# Patient Record
Sex: FEMALE | Race: WHITE | NOT HISPANIC OR LATINO | Employment: OTHER | URBAN - NONMETROPOLITAN AREA
[De-identification: names, ages, dates, MRNs, and addresses within clinical notes are randomized per-mention and may not be internally consistent; named-entity substitution may affect disease eponyms.]

---

## 2017-02-02 DIAGNOSIS — IMO0002 UNCONTROLLED TYPE 2 DIABETES MELLITUS: ICD-10-CM

## 2017-02-03 RX ORDER — INSULIN GLARGINE 100 [IU]/ML
INJECTION, SOLUTION SUBCUTANEOUS
Qty: 6 PEN | Refills: 3 | Status: SHIPPED | OUTPATIENT
Start: 2017-02-03 | End: 2017-07-20 | Stop reason: SDUPTHER

## 2017-02-28 RX ORDER — METOPROLOL SUCCINATE 50 MG/1
TABLET, EXTENDED RELEASE ORAL
Qty: 30 TABLET | Refills: 5 | Status: SHIPPED | OUTPATIENT
Start: 2017-02-28 | End: 2017-10-20 | Stop reason: SDUPTHER

## 2017-03-30 ENCOUNTER — PATIENT MESSAGE (OUTPATIENT)
Dept: INTERNAL MEDICINE | Facility: CLINIC | Age: 62
End: 2017-03-30

## 2017-03-30 NOTE — TELEPHONE ENCOUNTER
From: Mercy Clemens  To: Brooklyn Reveles MD  Sent: 3/30/2017 7:19 AM EDT  Subject: Prescription Question    prescription for bydureon 2 mg needs called in to [facility] as no refills is showing. thanks

## 2017-04-25 ENCOUNTER — PATIENT MESSAGE (OUTPATIENT)
Dept: INTERNAL MEDICINE | Facility: CLINIC | Age: 62
End: 2017-04-25

## 2017-04-26 RX ORDER — LOSARTAN POTASSIUM AND HYDROCHLOROTHIAZIDE 25; 100 MG/1; MG/1
1 TABLET ORAL DAILY
Qty: 30 TABLET | Refills: 5 | Status: SHIPPED | OUTPATIENT
Start: 2017-04-26 | End: 2017-10-20 | Stop reason: SDUPTHER

## 2017-04-26 NOTE — TELEPHONE ENCOUNTER
From: Mercy Clemens  To: Brooklyn Reveles MD  Sent: 4/25/2017 10:29 AM EDT  Subject: Prescription Question    need to refill prescription losartan hc t2 100-25 mg tab # [id number] with [facility] in [place], [place] thanks

## 2017-07-08 DIAGNOSIS — IMO0002 UNCONTROLLED TYPE 2 DIABETES MELLITUS: ICD-10-CM

## 2017-07-10 RX ORDER — FLURBIPROFEN SODIUM 0.3 MG/ML
SOLUTION/ DROPS OPHTHALMIC
Qty: 30 EACH | Refills: 5 | Status: SHIPPED | OUTPATIENT
Start: 2017-07-10 | End: 2018-03-23 | Stop reason: SDUPTHER

## 2017-07-20 DIAGNOSIS — IMO0002 UNCONTROLLED TYPE 2 DIABETES MELLITUS: ICD-10-CM

## 2017-07-20 DIAGNOSIS — M79.7 FIBROMYALGIA: ICD-10-CM

## 2017-07-20 RX ORDER — PREGABALIN 75 MG/1
75 CAPSULE ORAL 2 TIMES DAILY
Qty: 60 CAPSULE | Refills: 2 | OUTPATIENT
Start: 2017-07-20 | End: 2017-09-15 | Stop reason: SDUPTHER

## 2017-07-20 RX ORDER — INSULIN GLARGINE 100 [IU]/ML
INJECTION, SOLUTION SUBCUTANEOUS
Qty: 6 PEN | Refills: 5 | Status: SHIPPED | OUTPATIENT
Start: 2017-07-20 | End: 2017-09-15 | Stop reason: SDUPTHER

## 2017-07-21 ENCOUNTER — TELEPHONE (OUTPATIENT)
Dept: INTERNAL MEDICINE | Facility: CLINIC | Age: 62
End: 2017-07-21

## 2017-07-24 NOTE — TELEPHONE ENCOUNTER
Patient called to see if there was an update on PA for Lyrica. Would like a call back, when possible.

## 2017-07-25 ENCOUNTER — TELEPHONE (OUTPATIENT)
Dept: INTERNAL MEDICINE | Facility: CLINIC | Age: 62
End: 2017-07-25

## 2017-07-25 NOTE — TELEPHONE ENCOUNTER
PA approved for Lyrica. FRANK on [employer] vmx notifying them.   Called pt to let her know, FRANK on her vmx.

## 2017-07-25 NOTE — TELEPHONE ENCOUNTER
Called pt to let her know I had not received anything from [facility] about a PA for Lyrica. She said she had spoken with her insurance and they just needed the same PA form I had done last year. Advised her I would start on this. Faxed PA request to [facility].

## 2017-09-14 ENCOUNTER — TELEPHONE (OUTPATIENT)
Dept: INTERNAL MEDICINE | Facility: CLINIC | Age: 62
End: 2017-09-14

## 2017-09-14 DIAGNOSIS — M79.7 FIBROMYALGIA: ICD-10-CM

## 2017-09-14 RX ORDER — PREGABALIN 75 MG/1
75 CAPSULE ORAL 2 TIMES DAILY
Qty: 60 CAPSULE | Refills: 2 | Status: CANCELLED | OUTPATIENT
Start: 2017-09-14

## 2017-09-14 NOTE — TELEPHONE ENCOUNTER
PATIENT CALLED AND STATED THAT SHE SPOKE WITH THE INSURANCE AND SHE SHOULD BE APPROVED WITH THE PA FOR 6 MONTHS. PLEASE CALL AND TALK WITH THE PT ABOUT THIS.

## 2017-09-15 ENCOUNTER — OFFICE VISIT (OUTPATIENT)
Dept: INTERNAL MEDICINE | Facility: CLINIC | Age: 62
End: 2017-09-15

## 2017-09-15 VITALS
DIASTOLIC BLOOD PRESSURE: 80 MMHG | WEIGHT: 204 LBS | BODY MASS INDEX: 37.54 KG/M2 | RESPIRATION RATE: 12 BRPM | SYSTOLIC BLOOD PRESSURE: 124 MMHG | OXYGEN SATURATION: 98 % | HEART RATE: 82 BPM | HEIGHT: 62 IN

## 2017-09-15 DIAGNOSIS — E55.9 VITAMIN D DEFICIENCY: ICD-10-CM

## 2017-09-15 DIAGNOSIS — E78.49 OTHER HYPERLIPIDEMIA: ICD-10-CM

## 2017-09-15 DIAGNOSIS — I10 BENIGN ESSENTIAL HYPERTENSION: ICD-10-CM

## 2017-09-15 DIAGNOSIS — M79.7 FIBROMYALGIA: ICD-10-CM

## 2017-09-15 DIAGNOSIS — IMO0001 UNCONTROLLED TYPE 2 DIABETES MELLITUS WITHOUT COMPLICATION, WITH LONG-TERM CURRENT USE OF INSULIN: Primary | ICD-10-CM

## 2017-09-15 PROCEDURE — 83036 HEMOGLOBIN GLYCOSYLATED A1C: CPT | Performed by: FAMILY MEDICINE

## 2017-09-15 PROCEDURE — 99214 OFFICE O/P EST MOD 30 MIN: CPT | Performed by: FAMILY MEDICINE

## 2017-09-15 RX ORDER — PREGABALIN 75 MG/1
75 CAPSULE ORAL 2 TIMES DAILY
Qty: 60 CAPSULE | Refills: 2 | Status: SHIPPED | OUTPATIENT
Start: 2017-09-15 | End: 2017-10-20 | Stop reason: SDUPTHER

## 2017-09-15 RX ORDER — GLIPIZIDE 10 MG/1
10 TABLET ORAL
Qty: 60 TABLET | Refills: 3 | Status: SHIPPED | OUTPATIENT
Start: 2017-09-15 | End: 2017-12-04 | Stop reason: SDUPTHER

## 2017-09-15 RX ORDER — ERYTHROMYCIN 5 MG/G
OINTMENT OPHTHALMIC
COMMUNITY
Start: 2017-09-06 | End: 2017-10-20

## 2017-10-07 LAB
25(OH)D3+25(OH)D2 SERPL-MCNC: 29.8 NG/ML
ALBUMIN SERPL-MCNC: 4.2 G/DL (ref 3.5–5)
ALBUMIN/GLOB SERPL: 1.8 G/DL (ref 1–2)
ALP SERPL-CCNC: 57 U/L (ref 38–126)
ALT SERPL-CCNC: 60 U/L (ref 13–69)
AST SERPL-CCNC: 39 U/L (ref 15–46)
BILIRUB SERPL-MCNC: 0.8 MG/DL (ref 0.2–1.3)
BUN SERPL-MCNC: 15 MG/DL (ref 7–20)
BUN/CREAT SERPL: 21.4 (ref 7.1–23.5)
CALCIUM SERPL-MCNC: 9.6 MG/DL (ref 8.4–10.2)
CHLORIDE SERPL-SCNC: 106 MMOL/L (ref 98–107)
CHOLEST SERPL-MCNC: 174 MG/DL (ref 0–199)
CO2 SERPL-SCNC: 26 MMOL/L (ref 26–30)
CREAT SERPL-MCNC: 0.7 MG/DL (ref 0.6–1.3)
GLOBULIN SER CALC-MCNC: 2.3 GM/DL
GLUCOSE SERPL-MCNC: 195 MG/DL (ref 74–98)
HDLC SERPL-MCNC: 40 MG/DL (ref 40–60)
LDLC SERPL CALC-MCNC: 102 MG/DL (ref 0–99)
MICROALBUMIN UR-MCNC: 19.5 UG/ML
POTASSIUM SERPL-SCNC: 4.1 MMOL/L (ref 3.5–5.1)
PROT SERPL-MCNC: 6.5 G/DL (ref 6.3–8.2)
SODIUM SERPL-SCNC: 144 MMOL/L (ref 137–145)
TRIGL SERPL-MCNC: 161 MG/DL
VLDLC SERPL CALC-MCNC: 32.2 MG/DL

## 2017-10-13 ENCOUNTER — TRANSCRIBE ORDERS (OUTPATIENT)
Dept: MAMMOGRAPHY | Facility: HOSPITAL | Age: 62
End: 2017-10-13

## 2017-10-13 DIAGNOSIS — Z12.31 ENCOUNTER FOR SCREENING MAMMOGRAM FOR MALIGNANT NEOPLASM OF BREAST: Primary | ICD-10-CM

## 2017-10-13 LAB — HBA1C MFR BLD: 10.3 %

## 2017-10-20 ENCOUNTER — OFFICE VISIT (OUTPATIENT)
Dept: INTERNAL MEDICINE | Facility: CLINIC | Age: 62
End: 2017-10-20

## 2017-10-20 VITALS
HEIGHT: 62 IN | HEART RATE: 81 BPM | RESPIRATION RATE: 12 BRPM | OXYGEN SATURATION: 97 % | WEIGHT: 198.4 LBS | SYSTOLIC BLOOD PRESSURE: 124 MMHG | BODY MASS INDEX: 36.51 KG/M2 | DIASTOLIC BLOOD PRESSURE: 80 MMHG

## 2017-10-20 DIAGNOSIS — Z23 NEED FOR PNEUMOCOCCAL VACCINATION: ICD-10-CM

## 2017-10-20 DIAGNOSIS — F51.01 PRIMARY INSOMNIA: ICD-10-CM

## 2017-10-20 DIAGNOSIS — E78.2 MIXED HYPERLIPIDEMIA: ICD-10-CM

## 2017-10-20 DIAGNOSIS — E11.40 DIABETIC NEUROPATHY, PAINFUL (HCC): ICD-10-CM

## 2017-10-20 DIAGNOSIS — M79.7 FIBROMYALGIA: ICD-10-CM

## 2017-10-20 DIAGNOSIS — E55.9 VITAMIN D DEFICIENCY: ICD-10-CM

## 2017-10-20 DIAGNOSIS — IMO0001 UNCONTROLLED TYPE 2 DIABETES MELLITUS WITHOUT COMPLICATION, WITH LONG-TERM CURRENT USE OF INSULIN: Primary | ICD-10-CM

## 2017-10-20 DIAGNOSIS — I10 BENIGN ESSENTIAL HYPERTENSION: ICD-10-CM

## 2017-10-20 DIAGNOSIS — E89.40 PREMATURE SURGICAL MENOPAUSE: ICD-10-CM

## 2017-10-20 DIAGNOSIS — Z00.00 HEALTHCARE MAINTENANCE: ICD-10-CM

## 2017-10-20 DIAGNOSIS — Z91.89 AT RISK FOR BONE DENSITY LOSS: ICD-10-CM

## 2017-10-20 PROCEDURE — 90732 PPSV23 VACC 2 YRS+ SUBQ/IM: CPT | Performed by: FAMILY MEDICINE

## 2017-10-20 PROCEDURE — G0009 ADMIN PNEUMOCOCCAL VACCINE: HCPCS | Performed by: FAMILY MEDICINE

## 2017-10-20 PROCEDURE — 99396 PREV VISIT EST AGE 40-64: CPT | Performed by: FAMILY MEDICINE

## 2017-10-20 PROCEDURE — 99213 OFFICE O/P EST LOW 20 MIN: CPT | Performed by: FAMILY MEDICINE

## 2017-10-20 RX ORDER — GABAPENTIN 100 MG/1
100 CAPSULE ORAL 2 TIMES DAILY
Qty: 60 CAPSULE | Refills: 2 | Status: CANCELLED | OUTPATIENT
Start: 2017-10-20

## 2017-10-20 RX ORDER — LOSARTAN POTASSIUM AND HYDROCHLOROTHIAZIDE 25; 100 MG/1; MG/1
1 TABLET ORAL DAILY
Qty: 90 TABLET | Refills: 1 | Status: SHIPPED | OUTPATIENT
Start: 2017-10-20 | End: 2017-12-04 | Stop reason: SDUPTHER

## 2017-10-20 RX ORDER — METOPROLOL SUCCINATE 25 MG/1
25 TABLET, EXTENDED RELEASE ORAL DAILY
Qty: 30 TABLET | Refills: 1 | Status: SHIPPED | OUTPATIENT
Start: 2017-10-20 | End: 2017-12-04 | Stop reason: SDUPTHER

## 2017-10-20 RX ORDER — PREGABALIN 150 MG/1
150 CAPSULE ORAL 2 TIMES DAILY
Qty: 60 CAPSULE | Refills: 2 | Status: SHIPPED | OUTPATIENT
Start: 2017-10-20 | End: 2017-12-05 | Stop reason: SDUPTHER

## 2017-10-20 RX ORDER — TRAZODONE HYDROCHLORIDE 50 MG/1
TABLET ORAL
Qty: 60 TABLET | Refills: 1 | Status: SHIPPED | OUTPATIENT
Start: 2017-10-20 | End: 2017-12-04 | Stop reason: SDUPTHER

## 2017-10-20 RX ORDER — ATORVASTATIN CALCIUM 10 MG/1
10 TABLET, FILM COATED ORAL NIGHTLY
Qty: 90 TABLET | Refills: 1 | Status: SHIPPED | OUTPATIENT
Start: 2017-10-20 | End: 2017-12-04 | Stop reason: SDUPTHER

## 2017-10-20 NOTE — PROGRESS NOTES
Physical    Subjective   Mercy Clemens is a 62 y.o. female and is here for a comprehensive physical exam. The patient reports problems - diabetic neuropathy.    Diabetes Mellitus  Patient presents for follow up of diabetes. Current symptoms include: paresthesia of the feet. Symptoms have gradually improved. Patient denies hyperglycemia and hypoglycemia . Evaluation to date has included: hemoglobin A1C.  Home sugars: BGs range between 160-220 and 260-280 1 hour after meals. Current treatment: restarted glipizide.  She is following a diabetic diet for the most part.    Neuropathy: she is on 75 mg bid of lyrica for fibromyalgia.  She has been having more burning pain on bottom of feet and tried a 100 mg gabapentin that she had from previous prescriptions.  She thought it helped.  She hasn't been on higher lyrica doses in the past.       Do you take any herbs or supplements that were not prescribed by a doctor? no  Are you taking calcium supplements? N/A  Are you taking aspirin daily? No     History:  LMP: No LMP recorded.  Menopause: Yes  Last pap date: N/A  Abnormal pap? no    Period Duration (Days): Surgical menopause    OB History    Para Term  AB Living   2 2 2   2   SAB TAB Ectopic Multiple Live Births       2      # Outcome Date GA Lbr Nilo/2nd Weight Sex Delivery Anes PTL Lv   2 Term     M Vag-Spont   ILYA   1 Term     M Vag-Spont   ILYA         reports that she currently engages in sexual activity and has had male partners.      The following portions of the patient's history were reviewed and updated as appropriate:   She  has a past medical history of Abdominal bloating; Abdominal pain; BMI 34.0-34.9,adult; Diabetes mellitus; Diarrhea; Encounter for long-term (current) use of medications; Fatigue; Hyperlipidemia; Hypertension; Marked eosinophilia; Myalgia and myositis; Nausea; Reaction to chronic stress; RUQ abdominal pain; and Viral gastroenteritis.  She has Hyperlipidemia; Benign essential  hypertension; Gastroesophageal reflux disease; Uncontrolled type 2 diabetes mellitus without complication, with long-term current use of insulin; Vitamin D deficiency; and Fibromyalgia on her pertinent problem list.  She  has a past surgical history that includes Bladder surgery; Colonoscopy; Hysterectomy; and Bilateral oophorectomy.  Her family history includes Arthritis in her mother; Cancer in her father, maternal uncle, and paternal uncle; Hyperlipidemia in her mother; Hypertension in her mother; Lung cancer in her father, maternal uncle, and paternal uncle; Lymphoma in her maternal uncle; No Known Problems in her sister and sister; Stroke in her mother.  She  reports that she has never smoked. She has never used smokeless tobacco. She reports that she does not drink alcohol or use illicit drugs.  Current Outpatient Prescriptions   Medication Sig Dispense Refill   • atorvastatin (LIPITOR) 10 MG tablet Take 1 tablet by mouth Every Night. 90 tablet 1   • B-D UF III MINI PEN NEEDLES 31G X 5 MM misc USE ONCE DAILY AS DIRECTED 30 each 5   • Cholecalciferol (VITAMIN D3) 5000 UNITS tablet Take  by mouth. One po qd     • glipiZIDE (GLUCOTROL) 10 MG tablet Take 1 tablet by mouth 2 (Two) Times a Day Before Meals. 60 tablet 3   • glucose blood test strip Test once daily 100 each 3   • Insulin Glargine (LANTUS SOLOSTAR) 100 UNIT/ML injection pen Inject 45 Units under the skin Every Night. 5 pen 2   • LACTOBACILLUS EXTRA STRENGTH capsule Take  by mouth. Take as directed     • losartan-hydrochlorothiazide (HYZAAR) 100-25 MG per tablet Take 1 tablet by mouth Daily. 90 tablet 1   • metoprolol succinate XL (TOPROL-XL) 25 MG 24 hr tablet Take 1 tablet by mouth Daily. 30 tablet 1   • pregabalin (LYRICA) 150 MG capsule Take 1 capsule by mouth 2 (Two) Times a Day. 60 capsule 2   • Dulaglutide 0.75 MG/0.5ML solution pen-injector Inject 0.75 mg under the skin 1 (One) Time Per Week. 4 pen 2   • traZODone (DESYREL) 50 MG tablet Take 1  "tab nightly before bed, increase to 2 after 1 week if not effective 60 tablet 1     No current facility-administered medications for this visit.    .    Review of Systems  Do you have pain that bothers you in your daily life? yes    Review of Systems   Constitutional: Negative.    HENT: Negative.    Eyes: Negative.    Respiratory: Negative.    Cardiovascular: Negative.    Gastrointestinal: Negative.    Endocrine: Negative.    Genitourinary: Negative.    Musculoskeletal: Positive for gait problem and myalgias.   Skin: Negative.    Allergic/Immunologic: Negative.    Neurological: Positive for numbness.   Hematological: Negative.    Psychiatric/Behavioral: Positive for sleep disturbance.         Objective   /80  Pulse 81  Resp 12  Ht 62\" (157.5 cm)  Wt 198 lb 6.4 oz (90 kg)  SpO2 97%  BMI 36.29 kg/m2    Physical Exam   Constitutional: She is oriented to person, place, and time. She appears well-developed and well-nourished. No distress.   HENT:   Head: Normocephalic and atraumatic.   Right Ear: Tympanic membrane, external ear and ear canal normal.   Left Ear: Tympanic membrane, external ear and ear canal normal.   Nose: No rhinorrhea.   Mouth/Throat: Uvula is midline, oropharynx is clear and moist and mucous membranes are normal. No posterior oropharyngeal erythema. No tonsillar exudate.   Eyes: Conjunctivae and lids are normal. Pupils are equal, round, and reactive to light. Right eye exhibits no discharge. Left eye exhibits no discharge. No scleral icterus.   Neck: Trachea normal, normal range of motion and phonation normal. Neck supple. No thyroid mass and no thyromegaly present.   Cardiovascular: Normal rate, regular rhythm and normal heart sounds.    No murmur heard.  Pulmonary/Chest: Effort normal and breath sounds normal.   Abdominal: Soft. Normal appearance. There is no splenomegaly or hepatomegaly. There is no tenderness. No hernia.   Musculoskeletal: Normal range of motion. She exhibits no edema, " tenderness or deformity.   Lymphadenopathy:        Head (right side): No submental, no submandibular, no preauricular and no posterior auricular adenopathy present.        Head (left side): No submental, no submandibular, no preauricular and no posterior auricular adenopathy present.     She has no cervical adenopathy.        Right: No supraclavicular adenopathy present.        Left: No supraclavicular adenopathy present.   Neurological: She is alert and oriented to person, place, and time. She has normal strength.   Reflex Scores:       Patellar reflexes are 2+ on the right side and 2+ on the left side.  Skin: Skin is warm and dry. No rash noted. No pallor.   Psychiatric: She has a normal mood and affect. Her behavior is normal. Judgment and thought content normal.          Assessment/Plan   Healthy female exam.     1.    Diagnosis Plan   1. Uncontrolled type 2 diabetes mellitus without complication, with long-term current use of insulin  Dulaglutide 0.75 MG/0.5ML solution pen-injector    Insulin Glargine (LANTUS SOLOSTAR) 100 UNIT/ML injection pen   2. Premature surgical menopause  DEXA Bone Density Axial   3. Vitamin D deficiency  DEXA Bone Density Axial   4. Mixed hyperlipidemia  atorvastatin (LIPITOR) 10 MG tablet   5. Benign essential hypertension  losartan-hydrochlorothiazide (HYZAAR) 100-25 MG per tablet    metoprolol succinate XL (TOPROL-XL) 25 MG 24 hr tablet   6. Fibromyalgia  pregabalin (LYRICA) 150 MG capsule   7. Diabetic neuropathy, painful  pregabalin (LYRICA) 150 MG capsule   8. Need for pneumococcal vaccination  Pneumococcal Polysaccharide Vaccine 23-Valent Greater Than or Equal To 3yo Subcutaneous / IM    Pneumococcal Polysaccharide Vaccine 23-Valent Greater Than or Equal To 3yo Subcutaneous / IM   9. Primary insomnia  traZODone (DESYREL) 50 MG tablet     Samples of trulicity given, unclear if will be covered by insurance.  If tolerated will try to get PA or switch to bydureon.     2. Patient  Counseling:  --Nutrition: Stressed importance of moderation in sodium/caffeine intake, saturated fat and cholesterol, caloric balance, sufficient intake of fresh fruits, vegetables, fiber, calcium, iron, and 1 g folate supplementation if of childbearing age.   --Discussed the issue of calcium supplement, and the daily use of baby aspirin if applicable.  --Exercise: Stressed the importance of regular exercise.   --Substance Abuse: Discussed cessation/primary prevention of tobacco (if applicable), alcohol, or other drug use (if applicable); driving or other dangerous activities under the influence; availability of treatment for abuse.    --Sexuality: Discussed sexually transmitted diseases, partner selection, use of condoms, avoidance of unintended pregnancy  and contraceptive alternatives.   --Injury prevention: Discussed safety belts, safety helmets, smoke detector, smoking near bedding or upholstery.   --Dental health: Discussed importance of regular tooth brushing, flossing, and dental visits.  --Immunizations reviewed.  --Discussed benefits of screening colonoscopy (if applicable).  --After hours service discussed with patient    3. Discussed the patient's BMI with her.  The BMI is above average; BMI management plan is completed  4. Return in about 1 month (around 11/20/2017).      Health Maintenance   Topic Date Due   • TDAP/TD VACCINES (1 - Tdap) 06/23/1975   • ZOSTER VACCINE  06/23/2016   • MAMMOGRAM  12/05/2016   • DXA SCAN  12/05/2016   • INFLUENZA VACCINE  08/01/2017   • DIABETIC FOOT EXAM  09/15/2017   • DIABETIC EYE EXAM  09/15/2017   • HEMOGLOBIN A1C  03/15/2018   • LIPID PANEL  10/06/2018   • URINE MICROALBUMIN  10/06/2018   • COLONOSCOPY  02/10/2025   • PNEUMOCOCCAL VACCINE (19-64 MEDIUM RISK)  Completed   • HEPATITIS C SCREENING  Excluded   • PAP SMEAR  Excluded     Getting eye exam and mammogram coming up. Refused flu shot.      Will need dexa scan.

## 2017-10-22 PROBLEM — F51.01 PRIMARY INSOMNIA: Status: ACTIVE | Noted: 2017-10-22

## 2017-10-22 PROBLEM — E11.40 DIABETIC NEUROPATHY, PAINFUL (HCC): Status: ACTIVE | Noted: 2017-10-22

## 2017-10-24 ENCOUNTER — TELEPHONE (OUTPATIENT)
Dept: INTERNAL MEDICINE | Facility: CLINIC | Age: 62
End: 2017-10-24

## 2017-10-27 ENCOUNTER — APPOINTMENT (OUTPATIENT)
Dept: BONE DENSITY | Facility: HOSPITAL | Age: 62
End: 2017-10-27

## 2017-10-27 ENCOUNTER — HOSPITAL ENCOUNTER (OUTPATIENT)
Dept: MAMMOGRAPHY | Facility: HOSPITAL | Age: 62
Discharge: HOME | End: 2017-10-27
Admitting: FAMILY MEDICINE

## 2017-10-27 DIAGNOSIS — Z12.31 ENCOUNTER FOR SCREENING MAMMOGRAM FOR MALIGNANT NEOPLASM OF BREAST: ICD-10-CM

## 2017-10-27 PROCEDURE — 77063 BREAST TOMOSYNTHESIS BI: CPT

## 2017-10-27 PROCEDURE — G0202 SCR MAMMO BI INCL CAD: HCPCS

## 2017-11-17 ENCOUNTER — APPOINTMENT (OUTPATIENT)
Dept: BONE DENSITY | Facility: HOSPITAL | Age: 62
End: 2017-11-17

## 2017-11-17 DIAGNOSIS — Z00.00 HEALTHCARE MAINTENANCE: ICD-10-CM

## 2017-11-17 DIAGNOSIS — E55.9 VITAMIN D DEFICIENCY: ICD-10-CM

## 2017-11-17 DIAGNOSIS — E89.40 PREMATURE SURGICAL MENOPAUSE: ICD-10-CM

## 2017-11-17 DIAGNOSIS — Z91.89 AT RISK FOR BONE DENSITY LOSS: ICD-10-CM

## 2017-11-17 PROCEDURE — 77080 DXA BONE DENSITY AXIAL: CPT

## 2017-12-04 ENCOUNTER — PATIENT MESSAGE (OUTPATIENT)
Dept: INTERNAL MEDICINE | Facility: CLINIC | Age: 62
End: 2017-12-04

## 2017-12-04 DIAGNOSIS — E78.2 MIXED HYPERLIPIDEMIA: ICD-10-CM

## 2017-12-04 DIAGNOSIS — E11.40 DIABETIC NEUROPATHY, PAINFUL (HCC): ICD-10-CM

## 2017-12-04 DIAGNOSIS — F51.01 PRIMARY INSOMNIA: ICD-10-CM

## 2017-12-04 DIAGNOSIS — M79.7 FIBROMYALGIA: ICD-10-CM

## 2017-12-04 DIAGNOSIS — IMO0001 UNCONTROLLED TYPE 2 DIABETES MELLITUS WITHOUT COMPLICATION, WITH LONG-TERM CURRENT USE OF INSULIN: ICD-10-CM

## 2017-12-04 DIAGNOSIS — I10 BENIGN ESSENTIAL HYPERTENSION: ICD-10-CM

## 2017-12-04 RX ORDER — TRAZODONE HYDROCHLORIDE 50 MG/1
TABLET ORAL
Qty: 180 TABLET | Refills: 1 | Status: SHIPPED | OUTPATIENT
Start: 2017-12-04 | End: 2018-03-23

## 2017-12-04 RX ORDER — ATORVASTATIN CALCIUM 10 MG/1
10 TABLET, FILM COATED ORAL NIGHTLY
Qty: 90 TABLET | Refills: 1 | Status: SHIPPED | OUTPATIENT
Start: 2017-12-04 | End: 2018-03-23

## 2017-12-04 RX ORDER — GLIPIZIDE 10 MG/1
10 TABLET ORAL
Qty: 180 TABLET | Refills: 1 | Status: SHIPPED | OUTPATIENT
Start: 2017-12-04 | End: 2018-03-23

## 2017-12-04 RX ORDER — METOPROLOL SUCCINATE 25 MG/1
25 TABLET, EXTENDED RELEASE ORAL DAILY
Qty: 90 TABLET | Refills: 1 | Status: SHIPPED | OUTPATIENT
Start: 2017-12-04 | End: 2018-05-25 | Stop reason: SDUPTHER

## 2017-12-04 RX ORDER — LOSARTAN POTASSIUM AND HYDROCHLOROTHIAZIDE 25; 100 MG/1; MG/1
1 TABLET ORAL DAILY
Qty: 90 TABLET | Refills: 1 | Status: SHIPPED | OUTPATIENT
Start: 2017-12-04 | End: 2018-05-25 | Stop reason: SDUPTHER

## 2017-12-04 NOTE — TELEPHONE ENCOUNTER
From: Mercy Clemens  To: July Reveles MD  Sent: 12/4/2017 9:00 AM EST  Subject: Prescription Question    i have [facility] and they need approval from my dr. july Barker. please send approval for my mediciations. thanks

## 2017-12-05 RX ORDER — PREGABALIN 150 MG/1
150 CAPSULE ORAL 2 TIMES DAILY
Qty: 60 CAPSULE | Refills: 2 | OUTPATIENT
Start: 2017-12-05 | End: 2017-12-29 | Stop reason: SDUPTHER

## 2017-12-05 NOTE — TELEPHONE ENCOUNTER
From: Mercy Clemens  To: July Reveles MD  Sent: 12/4/2017 6:07 PM EST  Subject: Prescription Question    No they don't take the coupon on those thanks but lyrics till needs to be updated for its refills at [facility].  ----- Message -----  From: ANTONIO SUE  Sent: 12/4/2017 4:08 PM EST  To: Mercy Clemens  Subject: RE: Prescription Question    I sent the meds, wasn't sure about the strips and pen needles. And did you need Lyrica sent as well?     ----- Message -----   From: Mercy Clemens   Sent: 12/4/2017 9:00 AM EST   To: July Reveles MD  Subject: Prescription Question    i have enrolled in [facility] and they need approval from my dr. july Barker. please send approval for my mediciations. thanks

## 2017-12-08 ENCOUNTER — OFFICE VISIT (OUTPATIENT)
Dept: INTERNAL MEDICINE | Facility: CLINIC | Age: 62
End: 2017-12-08

## 2017-12-08 VITALS
OXYGEN SATURATION: 98 % | WEIGHT: 199.6 LBS | HEIGHT: 62 IN | RESPIRATION RATE: 12 BRPM | SYSTOLIC BLOOD PRESSURE: 124 MMHG | HEART RATE: 83 BPM | BODY MASS INDEX: 36.73 KG/M2 | DIASTOLIC BLOOD PRESSURE: 80 MMHG

## 2017-12-08 DIAGNOSIS — IMO0001 UNCONTROLLED TYPE 2 DIABETES MELLITUS WITHOUT COMPLICATION, WITH LONG-TERM CURRENT USE OF INSULIN: Primary | ICD-10-CM

## 2017-12-08 DIAGNOSIS — E11.40 DIABETIC NEUROPATHY, PAINFUL (HCC): ICD-10-CM

## 2017-12-08 PROCEDURE — 99214 OFFICE O/P EST MOD 30 MIN: CPT | Performed by: FAMILY MEDICINE

## 2017-12-08 RX ORDER — GABAPENTIN 300 MG/1
300 CAPSULE ORAL 3 TIMES DAILY
Qty: 90 CAPSULE | Refills: 0 | Status: SHIPPED | OUTPATIENT
Start: 2017-12-08 | End: 2018-01-19 | Stop reason: SDUPTHER

## 2017-12-08 NOTE — PROGRESS NOTES
Follow up visit after starting Trulicity. Made her sicker than the Bydureon, had vomiting with the Trulicity. She is doing better back on the Glipizide. She is tolerating it and her sugars are much better.     Home blood tests: 1-2 x per dayAnswers for HPI/ROS submitted by the patient on 12/6/2017   Diabetes problem  Diabetes type: type 2  MedicAlert ID: No  Disease duration: 5 years  fatigue: Yes  foot paresthesias: Yes  Symptom course: improving  confusion: No  dizziness: No  headaches: No  hunger: No  mood changes: No  nervous/anxious: No  pallor: No  seizures: No  sleepiness: No  speech difficulty: No  sweats: No  tremors: No  blackouts: No  hospitalization: No  nocturnal hypoglycemia: No  required assistance: No  required glucagon: No  CVA: No  heart disease: No  impotence: No  nephropathy: No  peripheral neuropathy: Yes  PVD: No  retinopathy: No  CAD risks: hypertension, stress  Current treatments: insulin injections, oral agent (monotherapy)  Treatment compliance: most of the time  Dose schedule: at bedtime  Given by: patient  Injection sites: abdominal wall    Monitoring compliance: good  Blood glucose trend: decreasing steadily  breakfast time: 6-7 am  breakfast glucose level: 140-180  lunch glucose level: 180-200  dinner glucose level: 180-200  High score: >200  Overall: 180-200  Weight trend: decreasing steadily  Current diet: generally healthy  Meal planning: avoidance of concentrated sweets, carbohydrate counting  Exercise: intermittently  Dietitian visit: No  Eye exam current: Yes  Sees podiatrist: No    SUBJECTIVE: Mercy Clemens is a 62 y.o. female seen for a follow up visit;      Diabetes Mellitus  Patient presents for follow up of diabetes. Current symptoms include: none. Symptoms have progressed to a point and plateaued. Patient denies hyperglycemia and hypoglycemia . Evaluation to date has included: hemoglobin A1C.  Home sugars: BGs range between 150 and 2 hours after meals 180. Current treatment:  restarted dm meds, tried GLP1.  She is not following a diabetic diet.            The following portions of the patient's history were reviewed and updated as appropriate: She  has a past medical history of Abdominal bloating; Abdominal pain; BMI 34.0-34.9,adult; Diabetes mellitus; Diarrhea; Encounter for long-term (current) use of medications; Fatigue; Hyperlipidemia; Hypertension; Marked eosinophilia; Myalgia and myositis; Nausea; Reaction to chronic stress; RUQ abdominal pain; and Viral gastroenteritis.  She has Hyperlipidemia; Benign essential hypertension; Gastroesophageal reflux disease; Uncontrolled type 2 diabetes mellitus without complication, with long-term current use of insulin; Vitamin D deficiency; and Fibromyalgia on her pertinent problem list.  She  has a past surgical history that includes Bladder surgery; Colonoscopy; Hysterectomy; Bilateral oophorectomy; and Breast biopsy.  Her family history includes Arthritis in her mother; Cancer in her father, maternal uncle, and paternal uncle; Hyperlipidemia in her mother; Hypertension in her mother; Lung cancer in her father, maternal uncle, and paternal uncle; Lymphoma in her maternal uncle; No Known Problems in her sister and sister; Stroke in her mother.  She  reports that she has never smoked. She has never used smokeless tobacco. She reports that she does not drink alcohol or use illicit drugs.  She has a current medication list which includes the following prescription(s): atorvastatin, b-d uf iii mini pen needles, vitamin d3, glipizide, glucose blood, insulin glargine, lactobacillus extra strength, losartan-hydrochlorothiazide, metoprolol succinate xl, pregabalin, trazodone, gabapentin, and linagliptin..    Review of Systems   Constitutional: Positive for fatigue.   Respiratory: Negative.    Cardiovascular: Negative.    Skin: Negative for pallor.   Neurological: Negative for dizziness, tremors, seizures, speech difficulty and headaches.  "  Psychiatric/Behavioral: Negative for confusion. The patient is not nervous/anxious.          OBJECTIVE:  /80  Pulse 83  Resp 12  Ht 157.5 cm (62\")  Wt 90.5 kg (199 lb 9.6 oz)  SpO2 98%  BMI 36.51 kg/m2     Physical Exam   Constitutional: She appears well-developed and well-nourished. No distress.           ASSESSMENT:   Diagnosis Plan   1. Uncontrolled type 2 diabetes mellitus without complication, with long-term current use of insulin  Insulin Glargine (LANTUS SOLOSTAR) 100 UNIT/ML injection pen    linagliptin (TRADJENTA) 5 MG tablet tablet    Hemoglobin A1c   2. Diabetic neuropathy, painful  gabapentin (NEURONTIN) 300 MG capsule       Tried A1C but got error on machine.     Medications Discontinued During This Encounter   Medication Reason   • Dulaglutide 0.75 MG/0.5ML solution pen-injector Side effects   • Insulin Glargine (LANTUS SOLOSTAR) 100 UNIT/ML injection pen Reorder     Pt thinks neuropathy was better w/ gabapentin, however lyrica works much better for her fibromyalgia.       Return in about 6 weeks (around 1/19/2018).                "

## 2017-12-15 LAB — HBA1C MFR BLD: 7.8 %

## 2017-12-18 ENCOUNTER — PATIENT MESSAGE (OUTPATIENT)
Dept: INTERNAL MEDICINE | Facility: CLINIC | Age: 62
End: 2017-12-18

## 2017-12-18 DIAGNOSIS — IMO0001 UNCONTROLLED TYPE 2 DIABETES MELLITUS WITHOUT COMPLICATION, WITH LONG-TERM CURRENT USE OF INSULIN: ICD-10-CM

## 2017-12-18 NOTE — TELEPHONE ENCOUNTER
Unique Mera MA 12/18/2017 4:46 PM EST        ----- Message -----   From: Mercy Clemens   Sent: 12/18/2017 12:05 PM   To: [facility]  Clinical Pool  Subject: Prescription Question     I have taken Tradjenta 5MG that you gave me. MY numbers are lower than they have been in a while. Seems to help and so far no side effects. My numbers this morning was 167 where it is usually around 200. I was pleasantly surprise. So if you need to do a call in to [facility] I'm ready to keep taking. thanks  Have a Archana Alvarado!!!!!!!!!!

## 2017-12-27 ENCOUNTER — TELEPHONE (OUTPATIENT)
Dept: INTERNAL MEDICINE | Facility: CLINIC | Age: 62
End: 2017-12-27

## 2017-12-27 NOTE — TELEPHONE ENCOUNTER
Patient is calling back about PA on Tradjenta.  I was unable to addend the previous msg.  Patient states insurance gave phone # [phone] that will get someone directly to authorization.   Patient states medication is ready to  at [facility] [place].

## 2017-12-27 NOTE — TELEPHONE ENCOUNTER
Pt notified Tradjenta has been approved. I called [facility] yesterday and notified them. She will go and pick it up.

## 2017-12-29 DIAGNOSIS — E11.40 DIABETIC NEUROPATHY, PAINFUL (HCC): ICD-10-CM

## 2017-12-29 DIAGNOSIS — M79.7 FIBROMYALGIA: ICD-10-CM

## 2017-12-29 RX ORDER — PREGABALIN 150 MG/1
150 CAPSULE ORAL 2 TIMES DAILY
Qty: 180 CAPSULE | Refills: 1 | OUTPATIENT
Start: 2017-12-29 | End: 2018-05-01 | Stop reason: SDUPTHER

## 2017-12-29 NOTE — TELEPHONE ENCOUNTER
[facility] faxed refill request for 90 day supply Lyrica. OK to call in per Dr. Reveles. Rx called in.

## 2018-01-19 ENCOUNTER — OFFICE VISIT (OUTPATIENT)
Dept: INTERNAL MEDICINE | Facility: CLINIC | Age: 63
End: 2018-01-19

## 2018-01-19 ENCOUNTER — HOSPITAL ENCOUNTER (OUTPATIENT)
Dept: GENERAL RADIOLOGY | Facility: HOSPITAL | Age: 63
Discharge: HOME | End: 2018-01-19
Attending: FAMILY MEDICINE | Admitting: FAMILY MEDICINE

## 2018-01-19 VITALS
RESPIRATION RATE: 12 BRPM | HEIGHT: 62 IN | WEIGHT: 202.2 LBS | DIASTOLIC BLOOD PRESSURE: 80 MMHG | SYSTOLIC BLOOD PRESSURE: 130 MMHG | OXYGEN SATURATION: 97 % | BODY MASS INDEX: 37.21 KG/M2 | HEART RATE: 74 BPM

## 2018-01-19 DIAGNOSIS — E11.40 DIABETIC NEUROPATHY, PAINFUL (HCC): ICD-10-CM

## 2018-01-19 DIAGNOSIS — M76.61 ACHILLES BURSITIS OF RIGHT LOWER EXTREMITY: ICD-10-CM

## 2018-01-19 DIAGNOSIS — E55.9 VITAMIN D DEFICIENCY: Primary | ICD-10-CM

## 2018-01-19 DIAGNOSIS — M79.671 PAIN OF RIGHT HEEL: ICD-10-CM

## 2018-01-19 DIAGNOSIS — E78.2 MIXED HYPERLIPIDEMIA: ICD-10-CM

## 2018-01-19 PROCEDURE — 73610 X-RAY EXAM OF ANKLE: CPT

## 2018-01-19 PROCEDURE — 99213 OFFICE O/P EST LOW 20 MIN: CPT | Performed by: FAMILY MEDICINE

## 2018-01-19 RX ORDER — GABAPENTIN 300 MG/1
CAPSULE ORAL
Qty: 90 CAPSULE | Refills: 2 | Status: SHIPPED | OUTPATIENT
Start: 2018-01-19 | End: 2018-04-24 | Stop reason: SDUPTHER

## 2018-03-23 ENCOUNTER — TELEPHONE (OUTPATIENT)
Dept: INTERNAL MEDICINE | Facility: CLINIC | Age: 63
End: 2018-03-23

## 2018-03-23 ENCOUNTER — OFFICE VISIT (OUTPATIENT)
Dept: INTERNAL MEDICINE | Facility: CLINIC | Age: 63
End: 2018-03-23

## 2018-03-23 VITALS
HEART RATE: 78 BPM | BODY MASS INDEX: 38.24 KG/M2 | OXYGEN SATURATION: 98 % | WEIGHT: 207.8 LBS | SYSTOLIC BLOOD PRESSURE: 122 MMHG | HEIGHT: 62 IN | RESPIRATION RATE: 12 BRPM | DIASTOLIC BLOOD PRESSURE: 80 MMHG

## 2018-03-23 DIAGNOSIS — M79.7 FIBROMYALGIA: ICD-10-CM

## 2018-03-23 DIAGNOSIS — M15.9 PRIMARY OSTEOARTHRITIS INVOLVING MULTIPLE JOINTS: ICD-10-CM

## 2018-03-23 DIAGNOSIS — IMO0001 UNCONTROLLED TYPE 2 DIABETES MELLITUS WITHOUT COMPLICATION, WITH LONG-TERM CURRENT USE OF INSULIN: Primary | ICD-10-CM

## 2018-03-23 DIAGNOSIS — E11.40 DIABETIC NEUROPATHY, PAINFUL (HCC): ICD-10-CM

## 2018-03-23 PROBLEM — M15.0 PRIMARY OSTEOARTHRITIS INVOLVING MULTIPLE JOINTS: Status: ACTIVE | Noted: 2018-03-23

## 2018-03-23 LAB — HBA1C MFR BLD: 9.1 %

## 2018-03-23 PROCEDURE — 99214 OFFICE O/P EST MOD 30 MIN: CPT | Performed by: FAMILY MEDICINE

## 2018-03-23 PROCEDURE — 83036 HEMOGLOBIN GLYCOSYLATED A1C: CPT | Performed by: FAMILY MEDICINE

## 2018-03-23 RX ORDER — LANCETS 30 GAUGE
EACH MISCELLANEOUS
Qty: 200 EACH | Refills: 2 | Status: SHIPPED | OUTPATIENT
Start: 2018-03-23 | End: 2018-07-18 | Stop reason: SDUPTHER

## 2018-03-23 NOTE — TELEPHONE ENCOUNTER
Called [facility] and they had question about qty. 2 pens sent in, advised pharm to fill QS x 1 month. They will do this.

## 2018-04-24 DIAGNOSIS — IMO0001 UNCONTROLLED TYPE 2 DIABETES MELLITUS WITHOUT COMPLICATION, WITH LONG-TERM CURRENT USE OF INSULIN: ICD-10-CM

## 2018-04-24 DIAGNOSIS — E11.40 DIABETIC NEUROPATHY, PAINFUL (HCC): ICD-10-CM

## 2018-04-24 RX ORDER — GABAPENTIN 300 MG/1
CAPSULE ORAL
Qty: 90 CAPSULE | Refills: 1 | OUTPATIENT
Start: 2018-04-24 | End: 2018-07-18 | Stop reason: SDUPTHER

## 2018-04-27 ENCOUNTER — OFFICE VISIT (OUTPATIENT)
Dept: INTERNAL MEDICINE | Facility: CLINIC | Age: 63
End: 2018-04-27

## 2018-04-27 VITALS
RESPIRATION RATE: 16 BRPM | WEIGHT: 203 LBS | SYSTOLIC BLOOD PRESSURE: 118 MMHG | TEMPERATURE: 97.4 F | OXYGEN SATURATION: 98 % | BODY MASS INDEX: 37.36 KG/M2 | HEART RATE: 76 BPM | DIASTOLIC BLOOD PRESSURE: 72 MMHG | HEIGHT: 62 IN

## 2018-04-27 DIAGNOSIS — IMO0001 UNCONTROLLED TYPE 2 DIABETES MELLITUS WITHOUT COMPLICATION, WITH LONG-TERM CURRENT USE OF INSULIN: ICD-10-CM

## 2018-04-27 PROCEDURE — 99213 OFFICE O/P EST LOW 20 MIN: CPT | Performed by: NURSE PRACTITIONER

## 2018-05-01 DIAGNOSIS — M79.7 FIBROMYALGIA: ICD-10-CM

## 2018-05-01 DIAGNOSIS — E11.40 DIABETIC NEUROPATHY, PAINFUL (HCC): ICD-10-CM

## 2018-05-25 ENCOUNTER — OFFICE VISIT (OUTPATIENT)
Dept: INTERNAL MEDICINE | Facility: CLINIC | Age: 63
End: 2018-05-25

## 2018-05-25 VITALS
WEIGHT: 205 LBS | HEART RATE: 88 BPM | HEIGHT: 62 IN | BODY MASS INDEX: 37.73 KG/M2 | TEMPERATURE: 97.6 F | SYSTOLIC BLOOD PRESSURE: 142 MMHG | DIASTOLIC BLOOD PRESSURE: 82 MMHG | RESPIRATION RATE: 16 BRPM | OXYGEN SATURATION: 98 %

## 2018-05-25 DIAGNOSIS — R60.0 LOWER EXTREMITY EDEMA: Primary | ICD-10-CM

## 2018-05-25 DIAGNOSIS — I10 BENIGN ESSENTIAL HYPERTENSION: ICD-10-CM

## 2018-05-25 DIAGNOSIS — IMO0001 UNCONTROLLED TYPE 2 DIABETES MELLITUS WITHOUT COMPLICATION, WITH LONG-TERM CURRENT USE OF INSULIN: ICD-10-CM

## 2018-05-25 PROCEDURE — 99213 OFFICE O/P EST LOW 20 MIN: CPT | Performed by: NURSE PRACTITIONER

## 2018-05-25 RX ORDER — LOSARTAN POTASSIUM AND HYDROCHLOROTHIAZIDE 25; 100 MG/1; MG/1
1 TABLET ORAL DAILY
Qty: 90 TABLET | Refills: 1 | Status: SHIPPED | OUTPATIENT
Start: 2018-05-25 | End: 2018-07-18 | Stop reason: SDUPTHER

## 2018-05-25 RX ORDER — CYCLOBENZAPRINE HCL 5 MG
5 TABLET ORAL 3 TIMES DAILY PRN
Qty: 20 TABLET | Refills: 0 | Status: SHIPPED | OUTPATIENT
Start: 2018-05-25 | End: 2018-07-18 | Stop reason: SDUPTHER

## 2018-05-25 RX ORDER — METOPROLOL SUCCINATE 25 MG/1
25 TABLET, EXTENDED RELEASE ORAL DAILY
Qty: 90 TABLET | Refills: 1 | Status: SHIPPED | OUTPATIENT
Start: 2018-05-25 | End: 2018-07-18 | Stop reason: SDUPTHER

## 2018-05-25 RX ORDER — GLIPIZIDE 10 MG/1
10 TABLET ORAL
COMMUNITY
End: 2018-07-18 | Stop reason: DRUGHIGH

## 2018-05-25 NOTE — PROGRESS NOTES
Chief Complaint / Reason:      Chief Complaint   Patient presents with   • Diabetes     f/u-1 mo.       Subjective     HPI  Patient presents today for one-month follow-up regarding diabetes.  She states that her blood sugar had been running in the 200s but she is now seen in the 140s and 150s.  She states August 31 she is taking snf and she is going to focus on improving overall health.  Her blood pressure is elevated and she states that it is probably stress related.  Denies chest pain, shortness of breath or heart palpitations.    History taken from: patient    PMH/FH/Social History were reviewed and updated appropriately in the electronic medical record.     Review of Systems:   Review of Systems   Constitutional: Negative.    Respiratory: Negative.    Cardiovascular: Negative.    Gastrointestinal: Negative.    Neurological: Positive for numbness (neuropathy ).     All other systems were reviewed and are negative.  Exceptions are noted in the subjective or above.      Objective     Vital Signs  Vitals:    05/25/18 1048   BP: 142/82   Pulse: 88   Resp: 16   Temp: 97.6 °F (36.4 °C)   SpO2: 98%       Body mass index is 37.49 kg/m².    Physical Exam   Constitutional: She is oriented to person, place, and time. She appears well-developed and well-nourished. No distress.   Cardiovascular: Normal rate, regular rhythm, normal heart sounds and intact distal pulses.    Pulmonary/Chest: Effort normal and breath sounds normal. She has no wheezes. She exhibits no tenderness.   Neurological: She is alert and oriented to person, place, and time.   Skin: Skin is warm and dry. No rash noted. No erythema. No pallor.   Psychiatric: She has a normal mood and affect. Her behavior is normal. Judgment and thought content normal.   Nursing note and vitals reviewed.       Results Review:    I reviewed the patient's Previous clinical results.       Medication Review:     Current Outpatient Prescriptions:   •  Blood Glucose  Monitoring Suppl w/Device kit, Check twice daily, Disp: 1 each, Rfl: 0  •  Cholecalciferol (VITAMIN D3) 5000 UNITS tablet, Take  by mouth. One po qd, Disp: , Rfl:   •  diclofenac (VOLTAREN) 1 % gel gel, Apply 4 g topically 4 (Four) Times a Day As Needed (joint pain)., Disp: 100 g, Rfl: 3  •  gabapentin (NEURONTIN) 300 MG capsule, TAKE ONE CAPSULE BY MOUTH EVERY AFTERNOON AND TAKE TWO CAPSULES BY MOUTH ONCE NIGHTLY, Disp: 90 capsule, Rfl: 1  •  glipiZIDE (GLUCOTROL) 10 MG tablet, Take 10 mg by mouth 2 (Two) Times a Day Before Meals., Disp: , Rfl:   •  glucose blood test strip, Test three times daily, Disp: 200 each, Rfl: 3  •  Insulin Glargine (LANTUS SOLOSTAR) 100 UNIT/ML injection pen, Inject 50 Units under the skin Every Night. (Patient taking differently: Inject 55 Units under the skin Every Night.), Disp: 15 pen, Rfl: 1  •  Insulin Lispro (HUMALOG) 100 UNIT/ML solution pen-injector, Inject 15 Units under the skin 3 (Three) Times a Day With Meals., Disp: 2 pen, Rfl: 3  •  Insulin Pen Needle (B-D UF III MINI PEN NEEDLES) 31G X 5 MM misc, Use with insulin shots 4 times daily, Disp: 120 each, Rfl: 5  •  Lancets misc, Check three times daily, Disp: 200 each, Rfl: 2  •  losartan-hydrochlorothiazide (HYZAAR) 100-25 MG per tablet, Take 1 tablet by mouth Daily., Disp: 90 tablet, Rfl: 1  •  LYRICA 150 MG capsule, TAKE ONE CAPSULE BY MOUTH TWICE A DAY, Disp: 60 capsule, Rfl: 1  •  metoprolol succinate XL (TOPROL-XL) 25 MG 24 hr tablet, Take 1 tablet by mouth Daily., Disp: 90 tablet, Rfl: 1  •  cyclobenzaprine (FLEXERIL) 5 MG tablet, Take 1 tablet by mouth 3 (Three) Times a Day As Needed for Muscle Spasms., Disp: 20 tablet, Rfl: 0  •  Elastic Bandages & Supports (MEDICAL COMPRESSION STOCKINGS) misc, 1 application Daily., Disp: 2 each, Rfl: 0    Assessment/Plan   Mercy was seen today for diabetes.    Diagnoses and all orders for this visit:    Benign essential hypertension  -     losartan-hydrochlorothiazide (HYZAAR) 100-25  MG per tablet; Take 1 tablet by mouth Daily.  -     metoprolol succinate XL (TOPROL-XL) 25 MG 24 hr tablet; Take 1 tablet by mouth Daily.  Initiate lifestyle modifications.   DASH Diet and exercise.   Compliance with medication regimen and discussed ways to prevent of long-term complications from high blood pressure.  Discussed when to seek medical attention.  Encouraged patient to take blood pressure daily and keep a log.      Uncontrolled type 2 diabetes mellitus without complication, with long-term current use of insulin  -     Insulin Lispro (HUMALOG) 100 UNIT/ML solution pen-injector; Inject 15 Units under the skin 3 (Three) Times a Day With Meals.    Follow diabetic diet  Monitor blood sugars as discussed  See eye doctor annually or as discussed  Wear protective foot wear/no bare feet  Check feet regularly for calluses or ulcers  Discussed risk of poorly controlled diabetes and long-term complications  Exercise as tolerated up to 30 minutes 5 days a week  Take all medications as prescribed      Return in about 4 weeks (around 6/22/2018), or if symptoms worsen or fail to improve.    Carolina Soto, APRN  05/25/2018

## 2018-07-03 DIAGNOSIS — IMO0001 UNCONTROLLED TYPE 2 DIABETES MELLITUS WITHOUT COMPLICATION, WITH LONG-TERM CURRENT USE OF INSULIN: ICD-10-CM

## 2018-07-03 DIAGNOSIS — F51.01 PRIMARY INSOMNIA: ICD-10-CM

## 2018-07-03 DIAGNOSIS — I10 BENIGN ESSENTIAL HYPERTENSION: ICD-10-CM

## 2018-07-03 RX ORDER — METOPROLOL SUCCINATE 25 MG/1
TABLET, EXTENDED RELEASE ORAL
Qty: 90 TABLET | Refills: 1 | Status: SHIPPED | OUTPATIENT
Start: 2018-07-03 | End: 2018-09-07 | Stop reason: SDUPTHER

## 2018-07-03 RX ORDER — GLIPIZIDE 10 MG/1
TABLET ORAL
Qty: 180 TABLET | Refills: 1 | Status: SHIPPED | OUTPATIENT
Start: 2018-07-03 | End: 2018-09-07 | Stop reason: SDUPTHER

## 2018-07-03 RX ORDER — TRAZODONE HYDROCHLORIDE 50 MG/1
TABLET ORAL
Qty: 180 TABLET | Refills: 1 | Status: SHIPPED | OUTPATIENT
Start: 2018-07-03 | End: 2018-09-07 | Stop reason: SDUPTHER

## 2018-07-03 RX ORDER — LOSARTAN POTASSIUM AND HYDROCHLOROTHIAZIDE 25; 100 MG/1; MG/1
TABLET ORAL
Qty: 90 TABLET | Refills: 1 | Status: SHIPPED | OUTPATIENT
Start: 2018-07-03 | End: 2018-09-07 | Stop reason: SDUPTHER

## 2018-07-09 DIAGNOSIS — M79.7 FIBROMYALGIA: ICD-10-CM

## 2018-07-09 DIAGNOSIS — E11.40 DIABETIC NEUROPATHY, PAINFUL (HCC): ICD-10-CM

## 2018-07-10 DIAGNOSIS — E11.40 DIABETIC NEUROPATHY, PAINFUL (HCC): ICD-10-CM

## 2018-07-10 DIAGNOSIS — M79.7 FIBROMYALGIA: ICD-10-CM

## 2018-07-10 RX ORDER — PREGABALIN 150 MG/1
150 CAPSULE ORAL 2 TIMES DAILY
Qty: 60 CAPSULE | Refills: 1 | Status: SHIPPED | OUTPATIENT
Start: 2018-07-10 | End: 2018-07-12 | Stop reason: SDUPTHER

## 2018-07-12 DIAGNOSIS — M79.7 FIBROMYALGIA: ICD-10-CM

## 2018-07-12 DIAGNOSIS — E11.40 DIABETIC NEUROPATHY, PAINFUL (HCC): ICD-10-CM

## 2018-07-12 RX ORDER — PREGABALIN 150 MG/1
150 CAPSULE ORAL 2 TIMES DAILY
Qty: 60 CAPSULE | Refills: 2 | Status: SHIPPED | OUTPATIENT
Start: 2018-07-12 | End: 2019-01-18 | Stop reason: SDUPTHER

## 2018-07-18 ENCOUNTER — OFFICE VISIT (OUTPATIENT)
Dept: INTERNAL MEDICINE | Facility: CLINIC | Age: 63
End: 2018-07-18

## 2018-07-18 VITALS
BODY MASS INDEX: 37.68 KG/M2 | HEIGHT: 62 IN | HEART RATE: 96 BPM | WEIGHT: 204.75 LBS | TEMPERATURE: 97.9 F | SYSTOLIC BLOOD PRESSURE: 124 MMHG | RESPIRATION RATE: 16 BRPM | OXYGEN SATURATION: 98 % | DIASTOLIC BLOOD PRESSURE: 78 MMHG

## 2018-07-18 DIAGNOSIS — M79.7 FIBROMYALGIA: ICD-10-CM

## 2018-07-18 DIAGNOSIS — E11.65 UNCONTROLLED TYPE 2 DIABETES MELLITUS WITH HYPERGLYCEMIA, WITH LONG-TERM CURRENT USE OF INSULIN (HCC): Primary | ICD-10-CM

## 2018-07-18 DIAGNOSIS — E11.40 DIABETIC NEUROPATHY, PAINFUL (HCC): ICD-10-CM

## 2018-07-18 DIAGNOSIS — R53.83 FATIGUE, UNSPECIFIED TYPE: ICD-10-CM

## 2018-07-18 DIAGNOSIS — I10 BENIGN ESSENTIAL HYPERTENSION: ICD-10-CM

## 2018-07-18 DIAGNOSIS — E78.2 MIXED HYPERLIPIDEMIA: ICD-10-CM

## 2018-07-18 DIAGNOSIS — E55.9 VITAMIN D DEFICIENCY: ICD-10-CM

## 2018-07-18 DIAGNOSIS — Z79.4 UNCONTROLLED TYPE 2 DIABETES MELLITUS WITH HYPERGLYCEMIA, WITH LONG-TERM CURRENT USE OF INSULIN (HCC): Primary | ICD-10-CM

## 2018-07-18 PROCEDURE — 99214 OFFICE O/P EST MOD 30 MIN: CPT | Performed by: NURSE PRACTITIONER

## 2018-07-18 RX ORDER — LANCETS 30 GAUGE
EACH MISCELLANEOUS
Qty: 200 EACH | Refills: 2 | Status: SHIPPED | OUTPATIENT
Start: 2018-07-18

## 2018-07-18 RX ORDER — CYCLOBENZAPRINE HCL 5 MG
5 TABLET ORAL 3 TIMES DAILY PRN
Qty: 20 TABLET | Refills: 0 | Status: SHIPPED | OUTPATIENT
Start: 2018-07-18 | End: 2018-08-16 | Stop reason: SDUPTHER

## 2018-07-18 RX ORDER — GABAPENTIN 300 MG/1
CAPSULE ORAL
Qty: 90 CAPSULE | Refills: 3 | Status: SHIPPED | OUTPATIENT
Start: 2018-07-18 | End: 2019-01-18 | Stop reason: SDUPTHER

## 2018-07-18 NOTE — PROGRESS NOTES
Chief Complaint / Reason:      Chief Complaint   Patient presents with   • Diabetes     followup, med refill. Needs gabapentin and Lyrica. Asked about a Tramadol rx.   • Hypertension   • Fibromyalgia       Subjective     HPI  Patient presents today for diabetes follow-up hypertension and fibromyalgia.  She states that she needs medication refilled such as gabapentin and Lyrica.  She asked about tramadol Rx as one of her friends takes it for her fibromyalgia and is wondering if it would help.  She had previously been given Flexeril and she states it has helped her rest and makes her feel better.  She is on 5 mg nightly.  Patient did not bring her glucometer and states that her fasting blood glucose was 230 this morning she is due for labs but she did eat today.  She states August 1 she will be retiring and she is going to be providing care for her granddaughter.  She denies chest pain, shortness of breath or heart palpitations.  Patient's previous hemoglobin A1c was 9.1.  Patient's vital signs stable.  History taken from: patient    PMH/FH/Social History were reviewed and updated appropriately in the electronic medical record.     Review of Systems:   Review of Systems   Constitutional: Negative.    Respiratory: Negative.    Cardiovascular: Negative.    Gastrointestinal: Negative.    Musculoskeletal: Positive for arthralgias and myalgias.   Skin: Negative for pallor.   Neurological: Negative.  Negative for dizziness, tremors, seizures, speech difficulty and headaches.   Psychiatric/Behavioral: Negative for confusion. The patient is not nervous/anxious.      All other systems were reviewed and are negative.  Exceptions are noted in the subjective or above.      Objective     Vital Signs  Vitals:    07/18/18 1004   BP: 124/78   Pulse: 96   Resp: 16   Temp: 97.9 °F (36.6 °C)   SpO2: 98%       Body mass index is 37.45 kg/m².    Physical Exam   Constitutional: She is oriented to person, place, and time. She appears  well-developed and well-nourished. No distress.   Cardiovascular: Normal rate, regular rhythm, normal heart sounds and intact distal pulses.    Pulmonary/Chest: Effort normal and breath sounds normal. She has no wheezes. She exhibits no tenderness.   Abdominal: Soft. Bowel sounds are normal.   Musculoskeletal: She exhibits tenderness.   Neurological: She is alert and oriented to person, place, and time.   Skin: Skin is warm and dry. Capillary refill takes less than 2 seconds. No rash noted. No erythema. No pallor.   Psychiatric: She has a normal mood and affect. Her behavior is normal. Judgment and thought content normal.   Nursing note and vitals reviewed.       Results Review:    I reviewed the patient's previous clinical results.       Medication Review:     Current Outpatient Prescriptions:   •  Blood Glucose Monitoring Suppl w/Device kit, Check twice daily, Disp: 1 each, Rfl: 0  •  Cholecalciferol (VITAMIN D3) 5000 UNITS tablet, Take  by mouth. One po qd, Disp: , Rfl:   •  cyclobenzaprine (FLEXERIL) 5 MG tablet, Take 1 tablet by mouth 3 (Three) Times a Day As Needed for Muscle Spasms., Disp: 20 tablet, Rfl: 0  •  diclofenac (VOLTAREN) 1 % gel gel, Apply 4 g topically 4 (Four) Times a Day As Needed (joint pain)., Disp: 100 g, Rfl: 3  •  gabapentin (NEURONTIN) 300 MG capsule, TAKE ONE CAPSULE BY MOUTH EVERY AFTERNOON AND TAKE TWO CAPSULES BY MOUTH ONCE NIGHTLY, Disp: 90 capsule, Rfl: 1  •  glipiZIDE (GLUCOTROL) 10 MG tablet, TAKE 1 TABLET TWICE A DAY BEFORE MEALS, Disp: 180 tablet, Rfl: 1  •  glucose blood test strip, Test three times daily, Disp: 200 each, Rfl: 3  •  Insulin Glargine (LANTUS SOLOSTAR) 100 UNIT/ML injection pen, Inject 60 Units under the skin Every Night., Disp: 15 pen, Rfl: 1  •  Insulin Lispro (HUMALOG) 100 UNIT/ML solution pen-injector, Inject 15 Units under the skin 3 (Three) Times a Day With Meals., Disp: 2 pen, Rfl: 3  •  Insulin Pen Needle (B-D UF III MINI PEN NEEDLES) 31G X 5 MM misc, Use  with insulin shots 4 times daily, Disp: 120 each, Rfl: 5  •  Lancets misc, Check three times daily, Disp: 200 each, Rfl: 2  •  losartan-hydrochlorothiazide (HYZAAR) 100-25 MG per tablet, TAKE 1 TABLET DAILY, Disp: 90 tablet, Rfl: 1  •  metoprolol succinate XL (TOPROL-XL) 25 MG 24 hr tablet, TAKE 1 TABLET DAILY, Disp: 90 tablet, Rfl: 1  •  pregabalin (LYRICA) 150 MG capsule, Take 1 capsule by mouth 2 (Two) Times a Day., Disp: 60 capsule, Rfl: 2  •  traZODone (DESYREL) 50 MG tablet, TAKE 1 TABLET NIGHTLY BEFORE BED, INCREASE TO 2 TABLETS AFTER ONE WEEK IF NOT EFFECTIVE, Disp: 180 tablet, Rfl: 1    Assessment/Plan   Mercy was seen today for diabetes, hypertension and fibromyalgia.    Diagnoses and all orders for this visit:    Uncontrolled type 2 diabetes mellitus with hyperglycemia, with long-term current use of insulin (CMS/HCC)  -     glucose blood test strip; Test three times daily  -     Insulin Pen Needle (B-D UF III MINI PEN NEEDLES) 31G X 5 MM misc; Use with insulin shots 4 times daily  -     Lancets misc; Check three times daily  -     Insulin Glargine (LANTUS SOLOSTAR) 100 UNIT/ML injection pen; Inject 60 Units under the skin Every Night.  -     Hemoglobin A1c  -     Comprehensive metabolic panel  Follow diabetic diet  Monitor blood sugars as discussed  See eye doctor annually or as discussed  Wear protective foot wear/no bare feet  Check feet regularly for calluses or ulcers  Discussed risk of poorly controlled diabetes and long-term complications  Exercise as tolerated up to 30 minutes 5 days a week  Take all medications as prescribed    Fibromyalgia  -     cyclobenzaprine (FLEXERIL) 5 MG tablet; Take 1 tablet by mouth 3 (Three) Times a Day As Needed for Muscle Spasms.    Diabetic neuropathy, painful (CMS/HCC)  Discussed nonpharmacological interventions and ways to improve circulation.  Recommend patient maintain glycemic control.  Will collaborate with Dr. Correia regarding Neurontin and Lyrica  Benign  essential hypertension  -     CBC w AUTO Differential  -     Comprehensive metabolic panel    Mixed hyperlipidemia  -     Lipid panel    Vitamin D deficiency  -     Vitamin D 25 hydroxy    Fatigue, unspecified type  -     TSH  -     T4, free        Return in about 3 months (around 10/18/2018), or if symptoms worsen or fail to improve.    Carolina Soto, APRN  07/18/2018

## 2018-07-19 LAB
25(OH)D3+25(OH)D2 SERPL-MCNC: 56.1 NG/ML
ALBUMIN SERPL-MCNC: 4.1 G/DL (ref 3.5–5)
ALBUMIN/GLOB SERPL: 1.6 G/DL (ref 1–2)
ALP SERPL-CCNC: 63 U/L (ref 38–126)
ALT SERPL-CCNC: 54 U/L (ref 13–69)
AST SERPL-CCNC: 36 U/L (ref 15–46)
BASOPHILS # BLD AUTO: 0.05 10*3/MM3 (ref 0–0.2)
BASOPHILS NFR BLD AUTO: 0.7 % (ref 0–2.5)
BILIRUB SERPL-MCNC: 0.6 MG/DL (ref 0.2–1.3)
BUN SERPL-MCNC: 18 MG/DL (ref 7–20)
BUN/CREAT SERPL: 25.7 (ref 7.1–23.5)
CALCIUM SERPL-MCNC: 10.3 MG/DL (ref 8.4–10.2)
CHLORIDE SERPL-SCNC: 98 MMOL/L (ref 98–107)
CHOLEST SERPL-MCNC: 176 MG/DL (ref 0–199)
CO2 SERPL-SCNC: 32 MMOL/L (ref 26–30)
CREAT SERPL-MCNC: 0.7 MG/DL (ref 0.6–1.3)
EOSINOPHIL # BLD AUTO: 0.32 10*3/MM3 (ref 0–0.7)
EOSINOPHIL NFR BLD AUTO: 4.5 % (ref 0–7)
ERYTHROCYTE [DISTWIDTH] IN BLOOD BY AUTOMATED COUNT: 12.8 % (ref 11.5–14.5)
GLOBULIN SER CALC-MCNC: 2.5 GM/DL
GLUCOSE SERPL-MCNC: 258 MG/DL (ref 74–98)
HBA1C MFR BLD: 8.8 %
HCT VFR BLD AUTO: 42.9 % (ref 37–47)
HDLC SERPL-MCNC: 38 MG/DL (ref 40–60)
HGB BLD-MCNC: 14.9 G/DL (ref 12–16)
IMM GRANULOCYTES # BLD: 0.04 10*3/MM3 (ref 0–0.06)
IMM GRANULOCYTES NFR BLD: 0.6 % (ref 0–0.6)
LDLC SERPL CALC-MCNC: 100 MG/DL (ref 0–99)
LYMPHOCYTES # BLD AUTO: 2.09 10*3/MM3 (ref 0.6–3.4)
LYMPHOCYTES NFR BLD AUTO: 29.4 % (ref 10–50)
MCH RBC QN AUTO: 31.7 PG (ref 27–31)
MCHC RBC AUTO-ENTMCNC: 34.7 G/DL (ref 30–37)
MCV RBC AUTO: 91.3 FL (ref 81–99)
MONOCYTES # BLD AUTO: 0.68 10*3/MM3 (ref 0–0.9)
MONOCYTES NFR BLD AUTO: 9.6 % (ref 0–12)
NEUTROPHILS # BLD AUTO: 3.93 10*3/MM3 (ref 2–6.9)
NEUTROPHILS NFR BLD AUTO: 55.2 % (ref 37–80)
NRBC BLD AUTO-RTO: 0 /100 WBC (ref 0–0)
PLATELET # BLD AUTO: 204 10*3/MM3 (ref 130–400)
POTASSIUM SERPL-SCNC: 4.3 MMOL/L (ref 3.5–5.1)
PROT SERPL-MCNC: 6.6 G/DL (ref 6.3–8.2)
RBC # BLD AUTO: 4.7 10*6/MM3 (ref 4.2–5.4)
SODIUM SERPL-SCNC: 138 MMOL/L (ref 137–145)
T4 FREE SERPL-MCNC: 1.43 NG/DL (ref 0.78–2.19)
TRIGL SERPL-MCNC: 188 MG/DL
TSH SERPL DL<=0.005 MIU/L-ACNC: 1.26 MIU/ML (ref 0.47–4.68)
VLDLC SERPL CALC-MCNC: 37.6 MG/DL
WBC # BLD AUTO: 7.11 10*3/MM3 (ref 4.8–10.8)

## 2018-08-13 ENCOUNTER — TELEPHONE (OUTPATIENT)
Dept: INTERNAL MEDICINE | Facility: CLINIC | Age: 63
End: 2018-08-13

## 2018-08-13 NOTE — TELEPHONE ENCOUNTER
I contacted  pharmacist at [facility] and advised him to change to NovoLog and basaglar as it was cheaper.

## 2018-08-13 NOTE — TELEPHONE ENCOUNTER
JONAH CALLED [phone] STATING THAT PT HAS CHANGED INSURANCE AND COPAY IS $95 FOF HUMALOG  NOVALOG QUICKPEN IS CHEAPER   PT ALSO HAS AN RX FROM [facility] THAT'S THE SAME BASAGLAR IS CHEAPER

## 2018-08-16 DIAGNOSIS — M79.7 FIBROMYALGIA: ICD-10-CM

## 2018-08-16 RX ORDER — CYCLOBENZAPRINE HCL 5 MG
5 TABLET ORAL 3 TIMES DAILY PRN
Qty: 20 TABLET | Refills: 0 | Status: SHIPPED | OUTPATIENT
Start: 2018-08-16 | End: 2018-09-10 | Stop reason: SDUPTHER

## 2018-08-17 DIAGNOSIS — Z79.4 UNCONTROLLED TYPE 2 DIABETES MELLITUS WITH HYPERGLYCEMIA, WITH LONG-TERM CURRENT USE OF INSULIN (HCC): ICD-10-CM

## 2018-08-17 DIAGNOSIS — E11.65 UNCONTROLLED TYPE 2 DIABETES MELLITUS WITH HYPERGLYCEMIA, WITH LONG-TERM CURRENT USE OF INSULIN (HCC): ICD-10-CM

## 2018-08-22 ENCOUNTER — TELEPHONE (OUTPATIENT)
Dept: INTERNAL MEDICINE | Facility: CLINIC | Age: 63
End: 2018-08-22

## 2018-08-22 NOTE — TELEPHONE ENCOUNTER
[facility] WILL COST PATIENT A $50 COPAY, SHE WOULD LIKE TO SWITCH TO TRESIEBA, ONLY HAS A $15 COPAY.

## 2018-08-24 NOTE — TELEPHONE ENCOUNTER
I sent in Rx for Tresiba and contacted patient to let her know.  I advised her to let us know if she needed  the needles.  And she stated she would send me a message on my chart.I told her to make sure she does the correct dosage

## 2018-09-07 DIAGNOSIS — IMO0001 UNCONTROLLED TYPE 2 DIABETES MELLITUS WITHOUT COMPLICATION, WITH LONG-TERM CURRENT USE OF INSULIN: ICD-10-CM

## 2018-09-07 DIAGNOSIS — F51.01 PRIMARY INSOMNIA: ICD-10-CM

## 2018-09-07 DIAGNOSIS — I10 BENIGN ESSENTIAL HYPERTENSION: ICD-10-CM

## 2018-09-07 RX ORDER — GLIPIZIDE 10 MG/1
10 TABLET ORAL
Qty: 180 TABLET | Refills: 1 | Status: SHIPPED | OUTPATIENT
Start: 2018-09-07 | End: 2019-01-18

## 2018-09-07 RX ORDER — TRAZODONE HYDROCHLORIDE 50 MG/1
50 TABLET ORAL NIGHTLY
Qty: 180 TABLET | Refills: 1 | Status: SHIPPED | OUTPATIENT
Start: 2018-09-07 | End: 2019-05-15

## 2018-09-07 RX ORDER — METOPROLOL SUCCINATE 25 MG/1
25 TABLET, EXTENDED RELEASE ORAL DAILY
Qty: 90 TABLET | Refills: 1 | Status: SHIPPED | OUTPATIENT
Start: 2018-09-07 | End: 2019-04-07 | Stop reason: SDUPTHER

## 2018-09-07 RX ORDER — LOSARTAN POTASSIUM AND HYDROCHLOROTHIAZIDE 25; 100 MG/1; MG/1
1 TABLET ORAL DAILY
Qty: 90 TABLET | Refills: 1 | Status: SHIPPED | OUTPATIENT
Start: 2018-09-07 | End: 2019-04-07 | Stop reason: SDUPTHER

## 2018-09-10 DIAGNOSIS — M79.7 FIBROMYALGIA: ICD-10-CM

## 2018-09-10 RX ORDER — CYCLOBENZAPRINE HCL 5 MG
TABLET ORAL
Qty: 20 TABLET | Refills: 0 | Status: SHIPPED | OUTPATIENT
Start: 2018-09-10 | End: 2018-10-12 | Stop reason: SDUPTHER

## 2018-10-11 DIAGNOSIS — M79.7 FIBROMYALGIA: ICD-10-CM

## 2018-10-11 RX ORDER — CYCLOBENZAPRINE HCL 5 MG
TABLET ORAL
Qty: 20 TABLET | Refills: 0 | Status: CANCELLED | OUTPATIENT
Start: 2018-10-11

## 2018-10-12 DIAGNOSIS — M79.7 FIBROMYALGIA: ICD-10-CM

## 2018-10-12 RX ORDER — CYCLOBENZAPRINE HCL 5 MG
5 TABLET ORAL 2 TIMES DAILY PRN
Qty: 20 TABLET | Refills: 0 | Status: SHIPPED | OUTPATIENT
Start: 2018-10-12 | End: 2018-10-18 | Stop reason: SDUPTHER

## 2018-10-17 ENCOUNTER — DOCUMENTATION (OUTPATIENT)
Dept: INTERNAL MEDICINE | Facility: CLINIC | Age: 63
End: 2018-10-17

## 2018-10-18 ENCOUNTER — TELEPHONE (OUTPATIENT)
Dept: INTERNAL MEDICINE | Facility: CLINIC | Age: 63
End: 2018-10-18

## 2018-10-18 ENCOUNTER — OFFICE VISIT (OUTPATIENT)
Dept: INTERNAL MEDICINE | Facility: CLINIC | Age: 63
End: 2018-10-18

## 2018-10-18 VITALS
HEIGHT: 62 IN | BODY MASS INDEX: 37.36 KG/M2 | WEIGHT: 203 LBS | OXYGEN SATURATION: 96 % | HEART RATE: 79 BPM | RESPIRATION RATE: 16 BRPM | TEMPERATURE: 97.8 F | DIASTOLIC BLOOD PRESSURE: 81 MMHG | SYSTOLIC BLOOD PRESSURE: 124 MMHG

## 2018-10-18 DIAGNOSIS — Z79.4 UNCONTROLLED TYPE 2 DIABETES MELLITUS WITH HYPERGLYCEMIA, WITH LONG-TERM CURRENT USE OF INSULIN (HCC): ICD-10-CM

## 2018-10-18 DIAGNOSIS — E11.65 UNCONTROLLED TYPE 2 DIABETES MELLITUS WITH HYPERGLYCEMIA, WITH LONG-TERM CURRENT USE OF INSULIN (HCC): ICD-10-CM

## 2018-10-18 DIAGNOSIS — IMO0001 UNCONTROLLED TYPE 2 DIABETES MELLITUS WITHOUT COMPLICATION, WITH LONG-TERM CURRENT USE OF INSULIN: ICD-10-CM

## 2018-10-18 DIAGNOSIS — F51.01 PRIMARY INSOMNIA: ICD-10-CM

## 2018-10-18 DIAGNOSIS — N39.0 URINARY TRACT INFECTION WITHOUT HEMATURIA, SITE UNSPECIFIED: Primary | ICD-10-CM

## 2018-10-18 DIAGNOSIS — M79.7 FIBROMYALGIA: ICD-10-CM

## 2018-10-18 LAB
BILIRUB BLD-MCNC: NEGATIVE MG/DL
CLARITY, POC: ABNORMAL
COLOR UR: YELLOW
GLUCOSE UR STRIP-MCNC: ABNORMAL MG/DL
KETONES UR QL: NEGATIVE
LEUKOCYTE EST, POC: NEGATIVE
NITRITE UR-MCNC: POSITIVE MG/ML
PH UR: 5 [PH] (ref 5–8)
PROT UR STRIP-MCNC: NEGATIVE MG/DL
RBC # UR STRIP: NEGATIVE /UL
SP GR UR: 1.01 (ref 1–1.03)
UROBILINOGEN UR QL: NORMAL

## 2018-10-18 PROCEDURE — 81003 URINALYSIS AUTO W/O SCOPE: CPT | Performed by: NURSE PRACTITIONER

## 2018-10-18 PROCEDURE — 99214 OFFICE O/P EST MOD 30 MIN: CPT | Performed by: NURSE PRACTITIONER

## 2018-10-18 RX ORDER — CYCLOBENZAPRINE HCL 5 MG
5 TABLET ORAL 2 TIMES DAILY PRN
Qty: 30 TABLET | Refills: 3 | Status: SHIPPED | OUTPATIENT
Start: 2018-10-18 | End: 2019-02-07 | Stop reason: SDUPTHER

## 2018-10-18 RX ORDER — NITROFURANTOIN 25; 75 MG/1; MG/1
100 CAPSULE ORAL 2 TIMES DAILY
Qty: 14 CAPSULE | Refills: 0 | Status: SHIPPED | OUTPATIENT
Start: 2018-10-18 | End: 2018-10-25

## 2018-10-18 NOTE — PROGRESS NOTES
Chief Complaint / Reason:      Chief Complaint   Patient presents with   • Medication Problem   • Insomnia   • Urinary Tract Infection       Subjective     HPI  Presents today with complaints of urinary symptoms she states that she has had burning frequency and lower abdominal pain.  States that she has tried increasing her water intake and taking cranberry juice with minimal relief.  It started 2 days ago.  She states that she also needs refills on medication as she has been resting better taking the muscle relaxer at night.  Denies chest pain, shortness of breath or heart palpitations denies fever or chills.  Vital signs are stable.  History taken from: patient    PMH/FH/Social History were reviewed and updated appropriately in the electronic medical record.     Review of Systems:   Review of Systems   Constitutional: Positive for fatigue.   Respiratory: Negative.    Cardiovascular: Negative.    Gastrointestinal: Positive for abdominal pain.   Genitourinary: Positive for difficulty urinating, dysuria, frequency and urgency.   Psychiatric/Behavioral: Positive for sleep disturbance.     All other systems were reviewed and are negative.  Exceptions are noted in the subjective or above.      Objective     Vital Signs  Vitals:    10/18/18 1030   BP: 124/81   Pulse: 79   Resp: 16   Temp: 97.8 °F (36.6 °C)   SpO2: 96%       Body mass index is 37.12 kg/m².    Physical Exam   Constitutional: She is oriented to person, place, and time. She appears well-developed and well-nourished.   HENT:   Head: Normocephalic.   Right Ear: External ear normal.   Left Ear: External ear normal.   Cardiovascular: Normal rate, regular rhythm, normal heart sounds and intact distal pulses.    Pulmonary/Chest: Effort normal and breath sounds normal.   Abdominal: There is tenderness in the suprapubic area. There is no CVA tenderness.   Neurological: She is alert and oriented to person, place, and time.   Skin: Skin is warm and dry.    Psychiatric: She has a normal mood and affect. Her behavior is normal. Judgment and thought content normal.   Nursing note and vitals reviewed.       Results Review:    I reviewed the patient's new clinical results.   Office Visit on 10/18/2018   Component Date Value Ref Range Status   • Color 10/18/2018 Yellow  Yellow, Straw, Dark Yellow, Macrina Final   • Clarity, UA 10/18/2018 Cloudy* Clear Final   • Specific Gravity  10/18/2018 1.010  1.005 - 1.030 Final   • pH, Urine 10/18/2018 5.0  5.0 - 8.0 Final   • Leukocytes 10/18/2018 Negative  Negative Final   • Nitrite, UA 10/18/2018 Positive* Negative Final   • Protein, POC 10/18/2018 Negative  Negative mg/dL Final   • Glucose, UA 10/18/2018 50 mg/dL* Negative, 1000 mg/dL (3+) mg/dL Final   • Ketones, UA 10/18/2018 Negative  Negative Final   • Urobilinogen, UA 10/18/2018 Normal  Normal Final   • Bilirubin 10/18/2018 Negative  Negative Final   • Blood, UA 10/18/2018 Negative  Negative Final       Medication Review:     Current Outpatient Prescriptions:   •  Blood Glucose Monitoring Suppl w/Device kit, Check twice daily, Disp: 1 each, Rfl: 0  •  Cholecalciferol (VITAMIN D3) 5000 UNITS tablet, Take  by mouth. One po qd, Disp: , Rfl:   •  cyclobenzaprine (FLEXERIL) 5 MG tablet, Take 1 tablet by mouth 2 (Two) Times a Day As Needed for Muscle Spasms., Disp: 30 tablet, Rfl: 3  •  diclofenac (VOLTAREN) 1 % gel gel, Apply 4 g topically 4 (Four) Times a Day As Needed (joint pain)., Disp: 100 g, Rfl: 3  •  gabapentin (NEURONTIN) 300 MG capsule, Take 1 capsule in afternoon and 2 capsules nightly, Disp: 90 capsule, Rfl: 3  •  glipiZIDE (GLUCOTROL) 10 MG tablet, Take 1 tablet by mouth 2 (Two) Times a Day Before Meals., Disp: 180 tablet, Rfl: 1  •  glucose blood test strip, Test three times daily, Disp: 200 each, Rfl: 3  •  Insulin Degludec 200 UNIT/ML solution pen-injector, Inject 60 Units under the skin into the appropriate area as directed Every Night., Disp: 10 mL, Rfl: 8  •   Insulin Lispro (HUMALOG) 100 UNIT/ML solution pen-injector, Inject 15 Units under the skin 3 (Three) Times a Day With Meals., Disp: 2 pen, Rfl: 3  •  Insulin Pen Needle (B-D UF III MINI PEN NEEDLES) 31G X 5 MM misc, Use with insulin shots 4 times daily, Disp: 120 each, Rfl: 5  •  Lancets misc, Check three times daily, Disp: 200 each, Rfl: 2  •  losartan-hydrochlorothiazide (HYZAAR) 100-25 MG per tablet, Take 1 tablet by mouth Daily., Disp: 90 tablet, Rfl: 1  •  metoprolol succinate XL (TOPROL-XL) 25 MG 24 hr tablet, Take 1 tablet by mouth Daily., Disp: 90 tablet, Rfl: 1  •  pregabalin (LYRICA) 150 MG capsule, Take 1 capsule by mouth 2 (Two) Times a Day., Disp: 60 capsule, Rfl: 2  •  traZODone (DESYREL) 50 MG tablet, Take 1 tablet by mouth Every Night., Disp: 180 tablet, Rfl: 1  •  nitrofurantoin, macrocrystal-monohydrate, (MACROBID) 100 MG capsule, Take 1 capsule by mouth 2 (Two) Times a Day for 7 days., Disp: 14 capsule, Rfl: 0    Assessment/Plan   Mercy was seen today for medication problem, insomnia and urinary tract infection.    Diagnoses and all orders for this visit:    Urinary tract infection without hematuria, site unspecified  -     POCT urinalysis dipstick, automated  -     nitrofurantoin, macrocrystal-monohydrate, (MACROBID) 100 MG capsule; Take 1 capsule by mouth 2 (Two) Times a Day for 7 days.  -     Urine Culture - Urine, Urine, Clean Catch    Fibromyalgia  -     cyclobenzaprine (FLEXERIL) 5 MG tablet; Take 1 tablet by mouth 2 (Two) Times a Day As Needed for Muscle Spasms.    Primary insomnia  -     cyclobenzaprine (FLEXERIL) 5 MG tablet; Take 1 tablet by mouth 2 (Two) Times a Day As Needed for Muscle Spasms.    Uncontrolled type 2 diabetes mellitus with hyperglycemia, with long-term current use of insulin (CMS/[facility])  -     Insulin Degludec 200 UNIT/ML solution pen-injector; Inject 60 Units under the skin into the appropriate area as directed Every Night.    Discussed dietary modifications and  medication compliance with patient as she did have glucose in urine.  Will recheck hemoglobin A1c at next visit.  Return in about 3 months (around 1/18/2019), or if symptoms worsen or fail to improve.    Carolina Soto, APRN  10/18/2018

## 2018-10-18 NOTE — TELEPHONE ENCOUNTER
Pt left a message the novolog prescription wa snot received by  [facility]. They didn't receive the prescription for the needles either. She is running low on the insulin, please call in soon.

## 2018-10-19 ENCOUNTER — TELEPHONE (OUTPATIENT)
Dept: INTERNAL MEDICINE | Facility: CLINIC | Age: 63
End: 2018-10-19

## 2018-10-19 DIAGNOSIS — Z79.4 UNCONTROLLED TYPE 2 DIABETES MELLITUS WITH HYPERGLYCEMIA, WITH LONG-TERM CURRENT USE OF INSULIN (HCC): ICD-10-CM

## 2018-10-19 DIAGNOSIS — E11.65 UNCONTROLLED TYPE 2 DIABETES MELLITUS WITH HYPERGLYCEMIA, WITH LONG-TERM CURRENT USE OF INSULIN (HCC): ICD-10-CM

## 2018-10-19 NOTE — TELEPHONE ENCOUNTER
Pt stated that humalog was sent to pharmacy and that she takes Novolog also needs needles, please advise  I did not see Novolog in pt chart

## 2018-10-23 LAB
BACTERIA UR CULT: ABNORMAL
BACTERIA UR CULT: ABNORMAL
OTHER ANTIBIOTIC SUSC ISLT: ABNORMAL

## 2018-10-25 ENCOUNTER — TELEPHONE (OUTPATIENT)
Dept: INTERNAL MEDICINE | Facility: CLINIC | Age: 63
End: 2018-10-25

## 2018-10-25 NOTE — TELEPHONE ENCOUNTER
Pt left a VM on the refill line, she states she was needing her Novalog sent to [facility] and the last she checked with them they did not have it yet. I seen in pts chart where it had been sent as of yesterday 10/24/18. I contacted pharmacy and spoke with Oleksandr, he confirmed that the pts RX was filled and ready to be picked up. Called to inform pt but was unable to reach her so I left her a VM letting her know.

## 2018-11-26 DIAGNOSIS — M25.571 ARTHRALGIA OF RIGHT FOOT: Primary | ICD-10-CM

## 2018-12-10 ENCOUNTER — HOSPITAL ENCOUNTER (OUTPATIENT)
Dept: MAMMOGRAPHY | Facility: HOSPITAL | Age: 63
Discharge: HOME | End: 2018-12-10
Admitting: NURSE PRACTITIONER

## 2018-12-10 ENCOUNTER — TRANSCRIBE ORDERS (OUTPATIENT)
Dept: MAMMOGRAPHY | Facility: HOSPITAL | Age: 63
End: 2018-12-10

## 2018-12-10 DIAGNOSIS — Z12.39 BREAST CANCER SCREENING: ICD-10-CM

## 2018-12-10 DIAGNOSIS — Z12.39 BREAST CANCER SCREENING: Primary | ICD-10-CM

## 2018-12-10 PROCEDURE — 77067 SCR MAMMO BI INCL CAD: CPT

## 2018-12-10 PROCEDURE — 77063 BREAST TOMOSYNTHESIS BI: CPT

## 2018-12-14 ENCOUNTER — TELEPHONE (OUTPATIENT)
Dept: INTERNAL MEDICINE | Facility: CLINIC | Age: 63
End: 2018-12-14

## 2018-12-14 NOTE — TELEPHONE ENCOUNTER
Patient called stating that her  had told her that she had missed a call from Carolina or her [facility]. She states that she was returning the call and believes it may have been about a prescription she is getting refilled. Please advise. Patient call back number is [phone]

## 2019-01-18 ENCOUNTER — PRIOR AUTHORIZATION (OUTPATIENT)
Dept: INTERNAL MEDICINE | Facility: CLINIC | Age: 64
End: 2019-01-18

## 2019-01-18 ENCOUNTER — OFFICE VISIT (OUTPATIENT)
Dept: INTERNAL MEDICINE | Facility: CLINIC | Age: 64
End: 2019-01-18

## 2019-01-18 VITALS
HEIGHT: 62 IN | WEIGHT: 203 LBS | TEMPERATURE: 97.6 F | BODY MASS INDEX: 37.36 KG/M2 | RESPIRATION RATE: 15 BRPM | OXYGEN SATURATION: 98 % | DIASTOLIC BLOOD PRESSURE: 90 MMHG | HEART RATE: 76 BPM | SYSTOLIC BLOOD PRESSURE: 132 MMHG

## 2019-01-18 DIAGNOSIS — E78.2 MIXED HYPERLIPIDEMIA: ICD-10-CM

## 2019-01-18 DIAGNOSIS — IMO0001 UNCONTROLLED TYPE 2 DIABETES MELLITUS WITHOUT COMPLICATION, WITH LONG-TERM CURRENT USE OF INSULIN: Primary | ICD-10-CM

## 2019-01-18 DIAGNOSIS — E11.40 DIABETIC NEUROPATHY, PAINFUL (HCC): ICD-10-CM

## 2019-01-18 DIAGNOSIS — Z79.899 LONG TERM USE OF DRUG: ICD-10-CM

## 2019-01-18 DIAGNOSIS — Z91.89 AT RISK FOR CARDIOVASCULAR EVENT: ICD-10-CM

## 2019-01-18 DIAGNOSIS — M79.7 FIBROMYALGIA: ICD-10-CM

## 2019-01-18 DIAGNOSIS — M15.9 PRIMARY OSTEOARTHRITIS INVOLVING MULTIPLE JOINTS: ICD-10-CM

## 2019-01-18 DIAGNOSIS — I10 BENIGN ESSENTIAL HYPERTENSION: ICD-10-CM

## 2019-01-18 LAB
BILIRUB BLD-MCNC: NEGATIVE MG/DL
CLARITY, POC: CLEAR
COLOR UR: YELLOW
EXPIRATION DATE: NORMAL
GLUCOSE UR STRIP-MCNC: ABNORMAL MG/DL
HBA1C MFR BLD: 9.8 %
KETONES UR QL: NEGATIVE
LEUKOCYTE EST, POC: NEGATIVE
Lab: NORMAL
NITRITE UR-MCNC: NEGATIVE MG/ML
PH UR: 5 [PH] (ref 5–8)
POC CREATININE URINE: 200
POC MICROALBUMIN URINE: 30
PROT UR STRIP-MCNC: NEGATIVE MG/DL
RBC # UR STRIP: NEGATIVE /UL
SP GR UR: 1.01 (ref 1–1.03)
UROBILINOGEN UR QL: NORMAL

## 2019-01-18 PROCEDURE — 82044 UR ALBUMIN SEMIQUANTITATIVE: CPT | Performed by: NURSE PRACTITIONER

## 2019-01-18 PROCEDURE — 81003 URINALYSIS AUTO W/O SCOPE: CPT | Performed by: NURSE PRACTITIONER

## 2019-01-18 PROCEDURE — 99214 OFFICE O/P EST MOD 30 MIN: CPT | Performed by: NURSE PRACTITIONER

## 2019-01-18 PROCEDURE — 82570 ASSAY OF URINE CREATININE: CPT | Performed by: NURSE PRACTITIONER

## 2019-01-18 PROCEDURE — 83036 HEMOGLOBIN GLYCOSYLATED A1C: CPT | Performed by: NURSE PRACTITIONER

## 2019-01-18 RX ORDER — GABAPENTIN 300 MG/1
CAPSULE ORAL
Qty: 90 CAPSULE | Refills: 3 | Status: SHIPPED | OUTPATIENT
Start: 2019-01-18 | End: 2019-05-15

## 2019-01-18 RX ORDER — PREGABALIN 150 MG/1
150 CAPSULE ORAL 2 TIMES DAILY
Qty: 60 CAPSULE | Refills: 2 | Status: SHIPPED | OUTPATIENT
Start: 2019-01-18 | End: 2019-05-26 | Stop reason: SDUPTHER

## 2019-01-18 NOTE — TELEPHONE ENCOUNTER
PA ran for Jardiance per fax request from [employer].      KEY: [id number]  PA APPROVED  Pharmacy notified

## 2019-01-21 RX ORDER — LANCETS
EACH MISCELLANEOUS
Qty: 100 EACH | Refills: 12 | Status: SHIPPED | OUTPATIENT
Start: 2019-01-21 | End: 2019-01-24 | Stop reason: SDUPTHER

## 2019-01-23 NOTE — PROGRESS NOTES
Chief Complaint / Reason:      Chief Complaint   Patient presents with   • Diabetes     medication refills       Subjective     HPI  Patient presents today for follow-up regarding diabetes and medication refills vital signs are stable with exception of slightly elevated blood pressure.  Patient has been having some urinary symptoms but denies fever or chills.   last hemoglobin A1c was 8.8 and was done on 7/19/18.  She states that she has been having a lot of joint pain  History taken from: patient    PMH/FH/Social History were reviewed and updated appropriately in the electronic medical record.     Review of Systems:   Review of Systems   Constitutional: Positive for fatigue.   Respiratory: Negative.    Cardiovascular: Negative.    Gastrointestinal: Positive for abdominal pain.   Genitourinary: Positive for difficulty urinating, dysuria, frequency and urgency.   Musculoskeletal: Positive for arthralgias and myalgias.   Psychiatric/Behavioral: Positive for sleep disturbance.     All other systems were reviewed and are negative.  Exceptions are noted in the subjective or above.      Objective     Vital Signs  Vitals:    01/18/19 1054   BP: 132/90   Pulse: 76   Resp: 15   Temp: 97.6 °F (36.4 °C)   SpO2: 98%       Body mass index is 37.12 kg/m².    Physical Exam   Constitutional: She is oriented to person, place, and time. She appears well-developed and well-nourished.   HENT:   Head: Normocephalic.   Right Ear: External ear normal.   Left Ear: External ear normal.   Cardiovascular: Normal rate, regular rhythm, normal heart sounds and intact distal pulses.   Pulmonary/Chest: Effort normal and breath sounds normal.   Abdominal: There is tenderness in the suprapubic area. There is no CVA tenderness.   Musculoskeletal: She exhibits tenderness.   Neurological: She is alert and oriented to person, place, and time. A sensory deficit is present.   Skin: Skin is warm and dry.   Psychiatric: She has a normal mood and affect.  Her behavior is normal. Judgment and thought content normal.   Nursing note and vitals reviewed.       Results Review:    I reviewed the patient's new clinical results.   Office Visit on 01/18/2019   Component Date Value Ref Range Status   • Color 01/18/2019 Yellow  Yellow, Straw, Dark Yellow, Macrina Final   • Clarity, UA 01/18/2019 Clear  Clear Final   • Specific Gravity  01/18/2019 1.015  1.005 - 1.030 Final   • pH, Urine 01/18/2019 5.0  5.0 - 8.0 Final   • Leukocytes 01/18/2019 Negative  Negative Final   • Nitrite, UA 01/18/2019 Negative  Negative Final   • Protein, POC 01/18/2019 Negative  Negative mg/dL Final   • Glucose, UA 01/18/2019 50 mg/dL* Negative, 1000 mg/dL (3+) mg/dL Final   • Ketones, UA 01/18/2019 Negative  Negative Final   • Urobilinogen, UA 01/18/2019 Normal  Normal Final   • Bilirubin 01/18/2019 Negative  Negative Final   • Blood, UA 01/18/2019 Negative  Negative Final   • Microalbumin, Urine 01/18/2019 30   Final   • Creatinine, Urine 01/18/2019 200   Final   • Hemoglobin A1C 01/18/2019 9.8  % Final   • Lot Number 01/18/2019 [id number]   Final   • Expiration Date 01/18/2019 6/2,020   Final       Medication Review:     Current Outpatient Medications:   •  Cholecalciferol (VITAMIN D3) 5000 UNITS tablet, Take  by mouth. One po qd, Disp: , Rfl:   •  cyclobenzaprine (FLEXERIL) 5 MG tablet, Take 1 tablet by mouth 2 (Two) Times a Day As Needed for Muscle Spasms., Disp: 30 tablet, Rfl: 3  •  diclofenac (VOLTAREN) 1 % gel gel, Apply 4 g topically to the appropriate area as directed 4 (Four) Times a Day As Needed (joint pain)., Disp: 100 g, Rfl: 3  •  gabapentin (NEURONTIN) 300 MG capsule, Take 1 capsule in afternoon and 2 capsules nightly, Disp: 90 capsule, Rfl: 3  •  glucose blood test strip, Test three times daily, Disp: 200 each, Rfl: 3  •  insulin aspart (NOVOLOG FLEXPEN) 100 UNIT/ML solution pen-injector sc pen, Inject 15 Units under the skin into the appropriate area as directed 3 (Three) Times a  Day With Meals., Disp: 5 pen, Rfl: 2  •  Insulin Degludec (TRESIBA FLEXTOUCH) 200 UNIT/ML solution pen-injector, , Disp: , Rfl:   •  Insulin Pen Needle (B-D UF III MINI PEN NEEDLES) 31G X 5 MM misc, Use with insulin shots 4 times daily, Disp: 120 each, Rfl: 5  •  Lancets misc, Check three times daily, Disp: 200 each, Rfl: 2  •  losartan-hydrochlorothiazide (HYZAAR) 100-25 MG per tablet, Take 1 tablet by mouth Daily., Disp: 90 tablet, Rfl: 1  •  metoprolol succinate XL (TOPROL-XL) 25 MG 24 hr tablet, Take 1 tablet by mouth Daily., Disp: 90 tablet, Rfl: 1  •  pregabalin (LYRICA) 150 MG capsule, Take 1 capsule by mouth 2 (Two) Times a Day., Disp: 60 capsule, Rfl: 2  •  traZODone (DESYREL) 50 MG tablet, Take 1 tablet by mouth Every Night., Disp: 180 tablet, Rfl: 1  •  ACCU-CHEK SOFTCLIX LANCETS lancets, Use as instructed 3 times daily, Disp: 300 each, Rfl: 4  •  Blood Glucose Monitoring Suppl w/Device kit, Check twice daily, Disp: 1 each, Rfl: 0  •  Empagliflozin (JARDIANCE) 10 MG tablet, Take 1 tablet by mouth Daily., Disp: 30 tablet, Rfl: 1  •  glucose blood (ACCU-CHEK FRANCISCO PLUS) test strip, Use as instructed, Disp: 100 each, Rfl: 12    Assessment/Plan   Mercy was seen today for diabetes.    Diagnoses and all orders for this visit:    Uncontrolled type 2 diabetes mellitus without complication, with long-term current use of insulin (CMS/[facility])  -     POCT urinalysis dipstick, automated  -     POCT microalbumin  -     POCT glycated hemoglobin, total  -     Empagliflozin (JARDIANCE) 10 MG tablet; Take 1 tablet by mouth Daily.  Follow diabetic diet  Monitor blood sugars as discussed  See eye doctor annually or as discussed  Wear protective foot wear/no bare feet  Check feet regularly for calluses or ulcers  Discussed risk of poorly controlled diabetes and long-term complications  Exercise as tolerated up to 30 minutes 5 days a week  Take all medications as prescribed    Primary osteoarthritis involving multiple joints  -      diclofenac (VOLTAREN) 1 % gel gel; Apply 4 g topically to the appropriate area as directed 4 (Four) Times a Day As Needed (joint pain).    Fibromyalgia  -     pregabalin (LYRICA) 150 MG capsule; Take 1 capsule by mouth 2 (Two) Times a Day.    Diabetic neuropathy, painful (CMS/HCC)  -     pregabalin (LYRICA) 150 MG capsule; Take 1 capsule by mouth 2 (Two) Times a Day.  -     gabapentin (NEURONTIN) 300 MG capsule; Take 1 capsule in afternoon and 2 capsules nightly  Recommend patient wean off these medications and discussed ways to properly wean.   At risk for cardiovascular event  -     Empagliflozin (JARDIANCE) 10 MG tablet; Take 1 tablet by mouth Daily.    Mixed hyperlipidemia    Benign essential hypertension  Initiate lifestyle modifications.   DASH Diet and exercise.   Compliance with medication regimen and discussed ways to prevent of long-term complications from high blood pressure.  Discussed when to seek medical attention.  Encouraged patient to take blood pressure daily and keep a log.          Return in about 4 weeks (around 2/15/2019), or if symptoms worsen or fail to improve.    Carolina Soto, APRN  01/18/2019

## 2019-01-24 RX ORDER — LANCETS
EACH MISCELLANEOUS
Qty: 300 EACH | Refills: 4 | Status: SHIPPED | OUTPATIENT
Start: 2019-01-24 | End: 2020-01-24

## 2019-01-29 ENCOUNTER — RESULTS ENCOUNTER (OUTPATIENT)
Dept: INTERNAL MEDICINE | Facility: CLINIC | Age: 64
End: 2019-01-29

## 2019-01-29 DIAGNOSIS — Z79.899 LONG TERM USE OF DRUG: ICD-10-CM

## 2019-02-07 DIAGNOSIS — F51.01 PRIMARY INSOMNIA: ICD-10-CM

## 2019-02-07 DIAGNOSIS — M79.7 FIBROMYALGIA: ICD-10-CM

## 2019-02-07 RX ORDER — CYCLOBENZAPRINE HCL 5 MG
TABLET ORAL
Qty: 30 TABLET | Refills: 2 | Status: SHIPPED | OUTPATIENT
Start: 2019-02-07 | End: 2019-02-15 | Stop reason: SDUPTHER

## 2019-02-15 ENCOUNTER — OFFICE VISIT (OUTPATIENT)
Dept: INTERNAL MEDICINE | Facility: CLINIC | Age: 64
End: 2019-02-15

## 2019-02-15 VITALS
HEIGHT: 62 IN | RESPIRATION RATE: 15 BRPM | OXYGEN SATURATION: 98 % | TEMPERATURE: 97.4 F | DIASTOLIC BLOOD PRESSURE: 78 MMHG | BODY MASS INDEX: 37.91 KG/M2 | WEIGHT: 206 LBS | HEART RATE: 79 BPM | SYSTOLIC BLOOD PRESSURE: 110 MMHG

## 2019-02-15 DIAGNOSIS — IMO0001 UNCONTROLLED TYPE 2 DIABETES MELLITUS WITHOUT COMPLICATION, WITH LONG-TERM CURRENT USE OF INSULIN: ICD-10-CM

## 2019-02-15 DIAGNOSIS — Z91.89 AT RISK FOR CARDIOVASCULAR EVENT: ICD-10-CM

## 2019-02-15 DIAGNOSIS — M79.7 FIBROMYALGIA: ICD-10-CM

## 2019-02-15 DIAGNOSIS — F51.01 PRIMARY INSOMNIA: ICD-10-CM

## 2019-02-15 PROCEDURE — 99214 OFFICE O/P EST MOD 30 MIN: CPT | Performed by: NURSE PRACTITIONER

## 2019-02-15 RX ORDER — CYCLOBENZAPRINE HCL 5 MG
5 TABLET ORAL 2 TIMES DAILY PRN
Qty: 180 TABLET | Refills: 2 | Status: SHIPPED | OUTPATIENT
Start: 2019-02-15 | End: 2020-06-11

## 2019-02-15 NOTE — PROGRESS NOTES
Chief Complaint / Reason:      Chief Complaint   Patient presents with   • Diabetes     follow up       Subjective     HPI  Patient presents today for follow-up regarding diabetes.  She did bring a log of some of her blood sugars.  Patient states she is feeling a lot better since her blood glucose is better controlled and she is weaning herself off of the Neurontin.  He states that she has been walking and trying to stay more active.  BMI is 37.7.  She denies chest pain, shortness of breath or heart palpitations.  A hailey was printed and reviewed.    History taken from: patient    PMH/FH/Social History were reviewed and updated appropriately in the electronic medical record.     Review of Systems:   Review of Systems   Constitutional: Positive for fatigue.   Respiratory: Negative.    Cardiovascular: Negative.    Gastrointestinal: Negative.    Musculoskeletal: Positive for arthralgias and myalgias.   Neurological: Negative.      All other systems were reviewed and are negative.  Exceptions are noted in the subjective or above.      Objective     Vital Signs  Vitals:    02/15/19 0948   BP: 110/78   Pulse: 79   Resp: 15   Temp: 97.4 °F (36.3 °C)   SpO2: 98%       Body mass index is 37.67 kg/m².    Physical Exam   Constitutional: She is oriented to person, place, and time. She appears well-developed and well-nourished.   HENT:   Head: Normocephalic.   Right Ear: External ear normal.   Left Ear: External ear normal.   Cardiovascular: Normal rate, regular rhythm, normal heart sounds and intact distal pulses.   Pulmonary/Chest: Effort normal and breath sounds normal.   Abdominal: Soft. Bowel sounds are normal. There is no CVA tenderness.   Musculoskeletal: She exhibits tenderness.   Neurological: She is alert and oriented to person, place, and time. A sensory deficit is present.   Skin: Skin is warm and dry.   Psychiatric: She has a normal mood and affect. Her behavior is normal. Judgment and thought content normal.    Nursing note and vitals reviewed.       Results Review:    I reviewed the patient's previous/new clinical results.   CONTROLLED SUBSTANCE TRACKING 4/24/2018 7/18/2018 12/14/2018 1/18/2019 2/15/2019   Last Ab 4/24/2018 7/18/2018 12/14/2018 1/18/2019 2/15/2019   Report Number - - [id number]   Last Controlled Substance Agreement - - - 1/18/2019 -     Medication Review:     Current Outpatient Medications:   •  ACCU-CHEK SOFTCLIX LANCETS lancets, Use as instructed 3 times daily, Disp: 300 each, Rfl: 4  •  Blood Glucose Monitoring Suppl w/Device kit, Check twice daily, Disp: 1 each, Rfl: 0  •  Cholecalciferol (VITAMIN D3) 5000 UNITS tablet, Take  by mouth. One po qd, Disp: , Rfl:   •  cyclobenzaprine (FLEXERIL) 5 MG tablet, Take 1 tablet by mouth 2 (Two) Times a Day As Needed for Muscle Spasms., Disp: 180 tablet, Rfl: 2  •  diclofenac (VOLTAREN) 1 % gel gel, Apply 4 g topically to the appropriate area as directed 4 (Four) Times a Day As Needed (joint pain)., Disp: 100 g, Rfl: 3  •  Empagliflozin (JARDIANCE) 10 MG tablet, Take 1 tablet by mouth Daily., Disp: 90 tablet, Rfl: 1  •  gabapentin (NEURONTIN) 300 MG capsule, Take 1 capsule in afternoon and 2 capsules nightly, Disp: 90 capsule, Rfl: 3  •  glucose blood (ACCU-CHEK FRANCISCO PLUS) test strip, Use as instructed, Disp: 100 each, Rfl: 12  •  glucose blood test strip, Test three times daily, Disp: 200 each, Rfl: 3  •  insulin aspart (NOVOLOG FLEXPEN) 100 UNIT/ML solution pen-injector sc pen, Inject 17 Units under the skin into the appropriate area as directed 3 (Three) Times a Day With Meals., Disp: 5 pen, Rfl: 2  •  Insulin Degludec (TRESIBA FLEXTOUCH) 200 UNIT/ML solution pen-injector, , Disp: , Rfl:   •  Insulin Pen Needle (B-D UF III MINI PEN NEEDLES) 31G X 5 MM misc, Use with insulin shots 4 times daily, Disp: 120 each, Rfl: 5  •  Lancets misc, Check three times daily, Disp: 200 each, Rfl: 2  •  losartan-hydrochlorothiazide (HYZAAR) 100-25 MG per  tablet, Take 1 tablet by mouth Daily., Disp: 90 tablet, Rfl: 1  •  metoprolol succinate XL (TOPROL-XL) 25 MG 24 hr tablet, Take 1 tablet by mouth Daily., Disp: 90 tablet, Rfl: 1  •  pregabalin (LYRICA) 150 MG capsule, Take 1 capsule by mouth 2 (Two) Times a Day., Disp: 60 capsule, Rfl: 2  •  traZODone (DESYREL) 50 MG tablet, Take 1 tablet by mouth Every Night., Disp: 180 tablet, Rfl: 1    Assessment/Plan   [facility] was seen today for diabetes.    Diagnoses and all orders for this visit:    Fibromyalgia  -     cyclobenzaprine (FLEXERIL) 5 MG tablet; Take 1 tablet by mouth 2 (Two) Times a Day As Needed for Muscle Spasms.    Primary insomnia  -     cyclobenzaprine (FLEXERIL) 5 MG tablet; Take 1 tablet by mouth 2 (Two) Times a Day As Needed for Muscle Spasms.    Uncontrolled type 2 diabetes mellitus without complication, with long-term current use of insulin (CMS/[facility])  -     Empagliflozin (JARDIANCE) 10 MG tablet; Take 1 tablet by mouth Daily.  Follow diabetic diet  Monitor blood sugars as discussed  See eye doctor annually or as discussed  Wear protective foot wear/no bare feet  Check feet regularly for calluses or ulcers  Discussed risk of poorly controlled diabetes and long-term complications  Exercise as tolerated up to 30 minutes 5 days a week  Take all medications as prescribed    At risk for cardiovascular event  -     Empagliflozin (JARDIANCE) 10 MG tablet; Take 1 tablet by mouth Daily.        Return in about 3 months (around 5/15/2019), or if symptoms worsen or fail to improve.    Carolina Soto, APRN  02/15/2019

## 2019-02-22 ENCOUNTER — TELEPHONE (OUTPATIENT)
Dept: INTERNAL MEDICINE | Facility: CLINIC | Age: 64
End: 2019-02-22

## 2019-04-07 DIAGNOSIS — I10 BENIGN ESSENTIAL HYPERTENSION: ICD-10-CM

## 2019-04-08 RX ORDER — METOPROLOL SUCCINATE 25 MG/1
TABLET, EXTENDED RELEASE ORAL
Qty: 90 TABLET | Refills: 1 | Status: SHIPPED | OUTPATIENT
Start: 2019-04-08 | End: 2019-11-02 | Stop reason: SDUPTHER

## 2019-04-08 RX ORDER — LOSARTAN POTASSIUM AND HYDROCHLOROTHIAZIDE 25; 100 MG/1; MG/1
TABLET ORAL
Qty: 90 TABLET | Refills: 1 | Status: SHIPPED | OUTPATIENT
Start: 2019-04-08 | End: 2019-12-05 | Stop reason: SDUPTHER

## 2019-05-08 DIAGNOSIS — M15.9 PRIMARY OSTEOARTHRITIS INVOLVING MULTIPLE JOINTS: ICD-10-CM

## 2019-05-15 ENCOUNTER — OFFICE VISIT (OUTPATIENT)
Dept: INTERNAL MEDICINE | Facility: CLINIC | Age: 64
End: 2019-05-15

## 2019-05-15 VITALS
OXYGEN SATURATION: 98 % | TEMPERATURE: 98.7 F | HEART RATE: 70 BPM | SYSTOLIC BLOOD PRESSURE: 110 MMHG | DIASTOLIC BLOOD PRESSURE: 72 MMHG | BODY MASS INDEX: 35.67 KG/M2 | WEIGHT: 195 LBS

## 2019-05-15 DIAGNOSIS — F51.01 PRIMARY INSOMNIA: ICD-10-CM

## 2019-05-15 DIAGNOSIS — IMO0001 UNCONTROLLED TYPE 2 DIABETES MELLITUS WITHOUT COMPLICATION, WITH LONG-TERM CURRENT USE OF INSULIN: Primary | ICD-10-CM

## 2019-05-15 LAB — HBA1C MFR BLD: 6.7 %

## 2019-05-15 PROCEDURE — 99214 OFFICE O/P EST MOD 30 MIN: CPT | Performed by: NURSE PRACTITIONER

## 2019-05-15 PROCEDURE — 83036 HEMOGLOBIN GLYCOSYLATED A1C: CPT | Performed by: NURSE PRACTITIONER

## 2019-05-15 RX ORDER — PRAZOSIN HYDROCHLORIDE 1 MG/1
1 CAPSULE ORAL NIGHTLY
Qty: 30 CAPSULE | Refills: 1 | Status: SHIPPED | OUTPATIENT
Start: 2019-05-15 | End: 2020-03-16

## 2019-05-15 NOTE — PROGRESS NOTES
Chief Complaint / Reason:      Chief Complaint   Patient presents with   • Follow-up     3 month       Subjective     HPI  Patient presents today for 3-month follow-up regarding diabetes and states that she has been doing a lot better and has been keeping track of her blood sugars and she started taking something called truvision.  She did bring the package for me to review it as she states that she has felt like it helped her lose weight and kind of curb her appetite.  Her last hemoglobin A1c was 9.8 and it was done on 1/18/2019.  It was rechecked today and it was 6.7.  Vital signs are stable.  She denies chest pain, shortness of breath or heart palpitations.  Overall she states that she is feeling a lot better with exception of not being able to sleep.  She denies SI or HI.  She states that she has always had trouble sleeping but denies any nightmares.  She states that she has tried wearing her CPAP also and is having difficulty wearing it at least 4 hours a night.  Patient has lost weight since her last visit she was 205 and now she weighs 195 today.    History taken from: patient    PMH/FH/Social History were reviewed and updated appropriately in the electronic medical record.     Review of Systems:   Review of Systems   Constitutional: Positive for fatigue.   Respiratory: Negative.    Cardiovascular: Negative.    Gastrointestinal: Negative.    Musculoskeletal: Positive for arthralgias and myalgias.   Neurological: Negative.    Psychiatric/Behavioral: Positive for sleep disturbance.     All other systems were reviewed and are negative.  Exceptions are noted in the subjective or above.      Objective     Vital Signs  Vitals:    05/15/19 0756   BP: 110/72   Pulse: 70   Temp: 98.7 °F (37.1 °C)   SpO2: 98%       Body mass index is 35.67 kg/m².    Physical Exam   Constitutional: She is oriented to person, place, and time. She appears well-developed and well-nourished.   HENT:   Head: Normocephalic.   Right Ear:  External ear normal.   Left Ear: External ear normal.   Cardiovascular: Normal rate, regular rhythm and normal heart sounds.   Pulmonary/Chest: Effort normal and breath sounds normal.   Abdominal: Soft. Bowel sounds are normal. There is no CVA tenderness.   Musculoskeletal: She exhibits tenderness.   Neurological: She is alert and oriented to person, place, and time.   Skin: Skin is warm and dry. Capillary refill takes less than 2 seconds.   Psychiatric: She has a normal mood and affect. Her behavior is normal. Judgment and thought content normal.   Nursing note and vitals reviewed.       Results Review:    I reviewed the patient's new clinical results.   Office Visit on 05/15/2019   Component Date Value Ref Range Status   • Hemoglobin A1C 05/15/2019 6.7  % Final         Medication Review:     Current Outpatient Medications:   •  ACCU-CHEK SOFTCLIX LANCETS lancets, Use as instructed 3 times daily, Disp: 300 each, Rfl: 4  •  Blood Glucose Monitoring Suppl w/Device kit, Check twice daily, Disp: 1 each, Rfl: 0  •  Cholecalciferol (VITAMIN D3) 5000 UNITS tablet, Take  by mouth. One po qd, Disp: , Rfl:   •  cyclobenzaprine (FLEXERIL) 5 MG tablet, Take 1 tablet by mouth 2 (Two) Times a Day As Needed for Muscle Spasms., Disp: 180 tablet, Rfl: 2  •  Empagliflozin (JARDIANCE) 10 MG tablet, Take 1 tablet by mouth Daily., Disp: 90 tablet, Rfl: 1  •  glucose blood (ACCU-CHEK FRANCISCO PLUS) test strip, Use as instructed, Disp: 100 each, Rfl: 12  •  glucose blood test strip, Test three times daily, Disp: 200 each, Rfl: 3  •  insulin aspart (NOVOLOG FLEXPEN) 100 UNIT/ML solution pen-injector sc pen, Inject 17 Units under the skin into the appropriate area as directed 3 (Three) Times a Day With Meals., Disp: 5 pen, Rfl: 2  •  Insulin Degludec (TRESIBA FLEXTOUCH) 200 UNIT/ML solution pen-injector, , Disp: , Rfl:   •  Insulin Pen Needle (B-D UF III MINI PEN NEEDLES) 31G X 5 MM misc, Use with insulin shots 4 times daily, Disp: 120 each,  Rfl: 5  •  Lancets misc, Check three times daily, Disp: 200 each, Rfl: 2  •  losartan-hydrochlorothiazide (HYZAAR) 100-25 MG per tablet, TAKE 1 TABLET DAILY, Disp: 90 tablet, Rfl: 1  •  metoprolol succinate XL (TOPROL-XL) 25 MG 24 hr tablet, TAKE 1 TABLET DAILY, Disp: 90 tablet, Rfl: 1  •  pregabalin (LYRICA) 150 MG capsule, Take 1 capsule by mouth 2 (Two) Times a Day., Disp: 60 capsule, Rfl: 2  •  prazosin (MINIPRESS) 1 MG capsule, Take 1 capsule by mouth Every Night., Disp: 30 capsule, Rfl: 1    Assessment/Plan   [facility] was seen today for follow-up.    Diagnoses and all orders for this visit:    Uncontrolled type 2 diabetes mellitus without complication, with long-term current use of insulin (CMS/[facility])  -     Empagliflozin (JARDIANCE) 10 MG tablet; Take 1 tablet by mouth Daily.  -     POC Glycosylated Hemoglobin (Hb A1C)  -     insulin aspart (NOVOLOG FLEXPEN) 100 UNIT/ML solution pen-injector sc pen; Inject 17 Units under the skin into the appropriate area as directed 3 (Three) Times a Day With Meals.  Improving advised patient to continue doing dietary modifications and taking medication as prescribed.  Primary insomnia  -     prazosin (MINIPRESS) 1 MG capsule; Take 1 capsule by mouth Every Night.  Discussed sleep hygiene with patient and discussed nonpharmacological interventions to help improve rest.      Return in about 2 months (around 7/22/2019), or if symptoms worsen or fail to improve, for Annual.    Carolina Soto, APRN  05/15/2019

## 2019-05-26 DIAGNOSIS — M79.7 FIBROMYALGIA: ICD-10-CM

## 2019-05-26 DIAGNOSIS — E11.40 DIABETIC NEUROPATHY, PAINFUL (HCC): ICD-10-CM

## 2019-07-15 ENCOUNTER — OFFICE VISIT (OUTPATIENT)
Dept: INTERNAL MEDICINE | Facility: CLINIC | Age: 64
End: 2019-07-15

## 2019-07-15 VITALS
BODY MASS INDEX: 37.73 KG/M2 | TEMPERATURE: 98.4 F | WEIGHT: 205 LBS | HEIGHT: 62 IN | DIASTOLIC BLOOD PRESSURE: 72 MMHG | HEART RATE: 74 BPM | SYSTOLIC BLOOD PRESSURE: 128 MMHG | RESPIRATION RATE: 15 BRPM | OXYGEN SATURATION: 97 %

## 2019-07-15 DIAGNOSIS — R53.83 FATIGUE, UNSPECIFIED TYPE: ICD-10-CM

## 2019-07-15 DIAGNOSIS — E11.40 DIABETIC NEUROPATHY, PAINFUL (HCC): ICD-10-CM

## 2019-07-15 DIAGNOSIS — E11.65 TYPE 2 DIABETES MELLITUS WITH HYPERGLYCEMIA, WITH LONG-TERM CURRENT USE OF INSULIN (HCC): ICD-10-CM

## 2019-07-15 DIAGNOSIS — Z00.00 PHYSICAL EXAM, ANNUAL: Primary | ICD-10-CM

## 2019-07-15 DIAGNOSIS — Z13.29 SCREENING FOR ENDOCRINE DISORDER: ICD-10-CM

## 2019-07-15 DIAGNOSIS — Z79.4 TYPE 2 DIABETES MELLITUS WITH HYPERGLYCEMIA, WITH LONG-TERM CURRENT USE OF INSULIN (HCC): ICD-10-CM

## 2019-07-15 DIAGNOSIS — E66.01 CLASS 2 SEVERE OBESITY WITH SERIOUS COMORBIDITY AND BODY MASS INDEX (BMI) OF 37.0 TO 37.9 IN ADULT, UNSPECIFIED OBESITY TYPE (HCC): ICD-10-CM

## 2019-07-15 DIAGNOSIS — E78.2 MIXED HYPERLIPIDEMIA: ICD-10-CM

## 2019-07-15 DIAGNOSIS — E55.9 VITAMIN D DEFICIENCY: ICD-10-CM

## 2019-07-15 PROCEDURE — 99396 PREV VISIT EST AGE 40-64: CPT | Performed by: NURSE PRACTITIONER

## 2019-07-15 RX ORDER — ASPIRIN 81 MG/1
81 TABLET, DELAYED RELEASE ORAL DAILY
COMMUNITY

## 2019-08-15 DIAGNOSIS — E11.65 UNCONTROLLED TYPE 2 DIABETES MELLITUS WITH HYPERGLYCEMIA, WITH LONG-TERM CURRENT USE OF INSULIN (HCC): ICD-10-CM

## 2019-08-15 DIAGNOSIS — Z79.4 UNCONTROLLED TYPE 2 DIABETES MELLITUS WITH HYPERGLYCEMIA, WITH LONG-TERM CURRENT USE OF INSULIN (HCC): ICD-10-CM

## 2019-09-19 DIAGNOSIS — E11.40 DIABETIC NEUROPATHY, PAINFUL (HCC): ICD-10-CM

## 2019-09-19 DIAGNOSIS — M79.7 FIBROMYALGIA: ICD-10-CM

## 2019-10-09 ENCOUNTER — OFFICE VISIT (OUTPATIENT)
Dept: INTERNAL MEDICINE | Facility: CLINIC | Age: 64
End: 2019-10-09

## 2019-10-09 VITALS
WEIGHT: 191 LBS | RESPIRATION RATE: 15 BRPM | TEMPERATURE: 97.3 F | BODY MASS INDEX: 35.15 KG/M2 | OXYGEN SATURATION: 97 % | HEART RATE: 69 BPM | SYSTOLIC BLOOD PRESSURE: 132 MMHG | HEIGHT: 62 IN | DIASTOLIC BLOOD PRESSURE: 80 MMHG

## 2019-10-09 DIAGNOSIS — E11.65 TYPE 2 DIABETES MELLITUS WITH HYPERGLYCEMIA, WITH LONG-TERM CURRENT USE OF INSULIN (HCC): Primary | ICD-10-CM

## 2019-10-09 DIAGNOSIS — Z79.4 TYPE 2 DIABETES MELLITUS WITH HYPERGLYCEMIA, WITH LONG-TERM CURRENT USE OF INSULIN (HCC): Primary | ICD-10-CM

## 2019-10-09 DIAGNOSIS — M25.50 ARTHRALGIA, UNSPECIFIED JOINT: ICD-10-CM

## 2019-10-09 DIAGNOSIS — M79.7 FIBROMYALGIA: ICD-10-CM

## 2019-10-09 DIAGNOSIS — I10 BENIGN ESSENTIAL HYPERTENSION: ICD-10-CM

## 2019-10-09 DIAGNOSIS — E11.40 DIABETIC NEUROPATHY, PAINFUL (HCC): ICD-10-CM

## 2019-10-09 LAB — HBA1C MFR BLD: 6.6 %

## 2019-10-09 PROCEDURE — 99214 OFFICE O/P EST MOD 30 MIN: CPT | Performed by: NURSE PRACTITIONER

## 2019-10-09 PROCEDURE — 83036 HEMOGLOBIN GLYCOSYLATED A1C: CPT | Performed by: NURSE PRACTITIONER

## 2019-10-09 RX ORDER — PREGABALIN 150 MG/1
150 CAPSULE ORAL 2 TIMES DAILY
Qty: 60 CAPSULE | Refills: 0 | Status: SHIPPED | OUTPATIENT
Start: 2019-10-09 | End: 2019-12-05 | Stop reason: SDUPTHER

## 2019-10-09 RX ORDER — MELOXICAM 7.5 MG/1
7.5 TABLET ORAL DAILY
Qty: 30 TABLET | Refills: 1 | Status: SHIPPED | OUTPATIENT
Start: 2019-10-09 | End: 2020-06-11

## 2019-10-09 NOTE — PROGRESS NOTES
Chief Complaint / Reason:      Chief Complaint   Patient presents with   • Diabetes     check up       Subjective     HPI  Patient presents today for diabetes follow-up.  She states overall she feels that she is doing okay and is compliant with medication but does struggle with dietary restrictions.  Last hemoglobin A1c was 6.7 and it was checked on 5/15/2019.  Patient states she continues to have joint pain.  Vital signs are stable with exception of slightly elevated blood pressure.  She denies chest pain, shortness of breath or heart palpitations.  History taken from: patient    PMH/FH/Social History were reviewed and updated appropriately in the electronic medical record.   Past Medical History:   Diagnosis Date   • Abdominal bloating    • Abdominal pain    • BMI 34.0-34.9,adult    • Diabetes mellitus (CMS/[facility])    • Diarrhea    • Encounter for long-term (current) use of medications    • Fatigue    • Hyperlipidemia    • Hypertension    • Marked eosinophilia    • Myalgia and myositis    • Nausea    • Reaction to chronic stress    • RUQ abdominal pain    • Viral gastroenteritis      Past Surgical History:   Procedure Laterality Date   • BILATERAL OOPHORECTOMY     • BLADDER SURGERY      Bladder tack : Mckay Marzetti procedure   • BREAST BIOPSY     • COLONOSCOPY      march 2015   • HYSTERECTOMY       Social History     Socioeconomic History   • Marital status:      Spouse name: Not on file   • Number of children: Not on file   • Years of education: Not on file   • Highest education level: Not on file   Tobacco Use   • Smoking status: Never Smoker   • Smokeless tobacco: Never Used   Substance and Sexual Activity   • Alcohol use: No   • Drug use: No   • Sexual activity: Yes     Partners: Male     Family History   Problem Relation Age of Onset   • Arthritis Mother    • Hypertension Mother    • Stroke Mother    • Hyperlipidemia Mother    • Cancer Father    • Lung cancer Father    • No Known Problems Sister     • Lymphoma Maternal Uncle    • Cancer Paternal Uncle    • Lung cancer Paternal Uncle    • No Known Problems Sister    • Cancer Maternal Uncle    • Lung cancer Maternal Uncle        Review of Systems:   Review of Systems   Constitutional: Positive for fatigue.   Respiratory: Negative.    Cardiovascular: Negative.    Musculoskeletal: Positive for arthralgias and myalgias.   Allergic/Immunologic: Positive for environmental allergies.   Neurological: Negative.    Psychiatric/Behavioral: Positive for stress. The patient is nervous/anxious.          All other systems were reviewed and are negative.  Exceptions are noted in the subjective or above.      Objective     Vital Signs  Vitals:    10/09/19 0908   BP: 132/80   Pulse: 69   Resp: 15   Temp: 97.3 °F (36.3 °C)   SpO2: 97%       Body mass index is 34.93 kg/m².    Physical Exam   Constitutional: She is oriented to person, place, and time. She appears well-developed and well-nourished. No distress.   HENT:   Head: Normocephalic.   Neck: No JVD present.   Cardiovascular: Normal rate, regular rhythm, normal heart sounds and intact distal pulses.   No murmur heard.  No edema   Pulmonary/Chest: Effort normal and breath sounds normal. No respiratory distress. She exhibits no tenderness.   Abdominal: Soft. She exhibits no distension. There is no tenderness.   Neurological: She is alert and oriented to person, place, and time.   Skin: Skin is warm and dry. Capillary refill takes less than 2 seconds. She is not diaphoretic.   Psychiatric: She has a normal mood and affect.   Nursing note and vitals reviewed.           Results Review:    I reviewed the patient's new clinical results.   Lab Results   Component Value Date    HGBA1C 6.6 10/09/2019    HGBA1C 6.7 05/15/2019    HGBA1C 9.8 01/18/2019         Medication Review:     Current Outpatient Medications:   •  ACCU-CHEK SOFTCLIX LANCETS lancets, Use as instructed 3 times daily, Disp: 300 each, Rfl: 4  •  aspirin 81 MG EC tablet,  Take 81 mg by mouth Daily., Disp: , Rfl:   •  Blood Glucose Monitoring Suppl w/Device kit, Check twice daily, Disp: 1 each, Rfl: 0  •  Cholecalciferol (VITAMIN D3) 5000 UNITS tablet, Take  by mouth. One po qd, Disp: , Rfl:   •  cyclobenzaprine (FLEXERIL) 5 MG tablet, Take 1 tablet by mouth 2 (Two) Times a Day As Needed for Muscle Spasms., Disp: 180 tablet, Rfl: 2  •  Empagliflozin (JARDIANCE) 10 MG tablet, Take 1 tablet by mouth Daily., Disp: 90 tablet, Rfl: 1  •  glucose blood (ACCU-CHEK FRANCISCO PLUS) test strip, Use as instructed, Disp: 100 each, Rfl: 12  •  glucose blood test strip, Test three times daily, Disp: 200 each, Rfl: 3  •  insulin aspart (NOVOLOG FLEXPEN) 100 UNIT/ML solution pen-injector sc pen, Inject 17 Units under the skin into the appropriate area as directed 3 (Three) Times a Day With Meals., Disp: 5 pen, Rfl: 2  •  Insulin Degludec (TRESIBA FLEXTOUCH) 200 UNIT/ML solution pen-injector, , Disp: , Rfl:   •  Insulin Pen Needle (B-D UF III MINI PEN NEEDLES) 31G X 5 MM misc, USE ONE PEN NEEDLE TO GIVE INSULIN SHOTS FOUR TIMES A DAY, Disp: 200 each, Rfl: 11  •  Lancets misc, Check three times daily, Disp: 200 each, Rfl: 2  •  losartan-hydrochlorothiazide (HYZAAR) 100-25 MG per tablet, TAKE 1 TABLET DAILY, Disp: 90 tablet, Rfl: 1  •  metoprolol succinate XL (TOPROL-XL) 25 MG 24 hr tablet, TAKE 1 TABLET DAILY, Disp: 90 tablet, Rfl: 1  •  Omega-3-Acid Eth Est, Dietary, 1 g capsule, Take  by mouth., Disp: , Rfl:   •  prazosin (MINIPRESS) 1 MG capsule, Take 1 capsule by mouth Every Night., Disp: 30 capsule, Rfl: 1  •  pregabalin (LYRICA) 150 MG capsule, Take 1 capsule by mouth 2 (Two) Times a Day., Disp: 60 capsule, Rfl: 0  •  meloxicam (MOBIC) 7.5 MG tablet, Take 1 tablet by mouth Daily., Disp: 30 tablet, Rfl: 1    Assessment/Plan   [facility] was seen today for diabetes.    Diagnoses and all orders for this visit:    Type 2 diabetes mellitus with hyperglycemia, with long-term current use of insulin (CMS/[facility])  -      insulin aspart (NOVOLOG FLEXPEN) 100 UNIT/ML solution pen-injector sc pen; Inject 17 Units under the skin into the appropriate area as directed 3 (Three) Times a Day With Meals.  -     POC Glycosylated Hemoglobin (Hb A1C)  Stable without worsening signs and symptoms  Fibromyalgia  -     pregabalin (LYRICA) 150 MG capsule; Take 1 capsule by mouth 2 (Two) Times a Day.  -     meloxicam (MOBIC) 7.5 MG tablet; Take 1 tablet by mouth Daily.    Diabetic neuropathy, painful (CMS/HCC)  -     pregabalin (LYRICA) 150 MG capsule; Take 1 capsule by mouth 2 (Two) Times a Day.    Arthralgia, unspecified joint  -     meloxicam (MOBIC) 7.5 MG tablet; Take 1 tablet by mouth Daily.    Benign essential hypertension    Discussed dietary modifications with patient and recommend she adhere to cardiac diabetic diet.  Recommend limiting caffeine intake and discussed medication such as Mobic that can increase blood pressure.  Advised patient to only take NSAIDs as needed and discussed risk factors associated with NSAID usage.    Return in about 6 months (around 4/9/2020), or if symptoms worsen or fail to improve.    Carolina Soto, APRN  10/09/2019

## 2019-11-02 DIAGNOSIS — I10 BENIGN ESSENTIAL HYPERTENSION: ICD-10-CM

## 2019-11-04 RX ORDER — METOPROLOL SUCCINATE 25 MG/1
TABLET, EXTENDED RELEASE ORAL
Qty: 90 TABLET | Refills: 1 | Status: SHIPPED | OUTPATIENT
Start: 2019-11-04 | End: 2020-11-26 | Stop reason: SDUPTHER

## 2019-11-12 LAB
25(OH)D3+25(OH)D2 SERPL-MCNC: 49.6 NG/ML (ref 30–100)
ALBUMIN SERPL-MCNC: 4.7 G/DL (ref 3.5–5.2)
ALBUMIN/GLOB SERPL: 2.2 G/DL
ALP SERPL-CCNC: 94 U/L (ref 39–117)
ALT SERPL-CCNC: 31 U/L (ref 1–33)
AST SERPL-CCNC: 22 U/L (ref 1–32)
BASOPHILS # BLD AUTO: (no result) 10*3/UL
BASOPHILS # BLD MANUAL: 0 10*3/MM3 (ref 0–0.2)
BASOPHILS NFR BLD MANUAL: 0 % (ref 0–1.5)
BILIRUB SERPL-MCNC: 1 MG/DL (ref 0.2–1.2)
BUN SERPL-MCNC: 16 MG/DL (ref 8–23)
BUN/CREAT SERPL: 17.6 (ref 7–25)
CALCIUM SERPL-MCNC: 9.9 MG/DL (ref 8.6–10.5)
CHLORIDE SERPL-SCNC: 99 MMOL/L (ref 98–107)
CHOLEST SERPL-MCNC: 194 MG/DL (ref 0–200)
CO2 SERPL-SCNC: 28.7 MMOL/L (ref 22–29)
CREAT SERPL-MCNC: 0.91 MG/DL (ref 0.57–1)
DIFFERENTIAL COMMENT: ABNORMAL
EOSINOPHIL # BLD AUTO: (no result) 10*3/UL
EOSINOPHIL # BLD MANUAL: 0.21 10*3/MM3 (ref 0–0.4)
EOSINOPHIL NFR BLD AUTO: (no result) %
EOSINOPHIL NFR BLD MANUAL: 3 % (ref 0.3–6.2)
ERYTHROCYTE [DISTWIDTH] IN BLOOD BY AUTOMATED COUNT: 12.9 % (ref 12.3–15.4)
GLOBULIN SER CALC-MCNC: 2.1 GM/DL
GLUCOSE SERPL-MCNC: 123 MG/DL (ref 65–99)
HCT VFR BLD AUTO: 47.3 % (ref 34–46.6)
HDLC SERPL-MCNC: 40 MG/DL (ref 40–60)
HGB BLD-MCNC: 17.2 G/DL (ref 12–15.9)
LDLC SERPL CALC-MCNC: 124 MG/DL (ref 0–100)
LYMPHOCYTES # BLD AUTO: (no result) 10*3/UL
LYMPHOCYTES # BLD MANUAL: 2.07 10*3/MM3 (ref 0.7–3.1)
LYMPHOCYTES NFR BLD AUTO: (no result) %
LYMPHOCYTES NFR BLD MANUAL: 30 % (ref 19.6–45.3)
MCH RBC QN AUTO: 33.1 PG (ref 26.6–33)
MCHC RBC AUTO-ENTMCNC: 36.4 G/DL (ref 31.5–35.7)
MCV RBC AUTO: 91.1 FL (ref 79–97)
MONOCYTES # BLD MANUAL: 1.11 10*3/MM3 (ref 0.1–0.9)
MONOCYTES NFR BLD AUTO: (no result) %
MONOCYTES NFR BLD MANUAL: 16 % (ref 5–12)
NEUTROPHILS # BLD MANUAL: 3.39 10*3/MM3 (ref 1.7–7)
NEUTROPHILS NFR BLD AUTO: (no result) %
NEUTROPHILS NFR BLD MANUAL: 49 % (ref 42.7–76)
PLATELET # BLD AUTO: 207 10*3/MM3 (ref 140–450)
PLATELET BLD QL SMEAR: ABNORMAL
POTASSIUM SERPL-SCNC: 3.9 MMOL/L (ref 3.5–5.2)
PROT SERPL-MCNC: 6.8 G/DL (ref 6–8.5)
RBC # BLD AUTO: 5.19 10*6/MM3 (ref 3.77–5.28)
RBC MORPH BLD: ABNORMAL
SODIUM SERPL-SCNC: 142 MMOL/L (ref 136–145)
T4 FREE SERPL-MCNC: 1.49 NG/DL (ref 0.93–1.7)
TRIGL SERPL-MCNC: 151 MG/DL (ref 0–150)
TSH SERPL DL<=0.005 MIU/L-ACNC: 1.27 UIU/ML (ref 0.27–4.2)
VIT B12 SERPL-MCNC: 1429 PG/ML (ref 211–946)
VLDLC SERPL CALC-MCNC: 30.2 MG/DL
WBC # BLD AUTO: 6.91 10*3/MM3 (ref 3.4–10.8)

## 2019-12-05 DIAGNOSIS — I10 BENIGN ESSENTIAL HYPERTENSION: ICD-10-CM

## 2019-12-05 DIAGNOSIS — IMO0001 UNCONTROLLED TYPE 2 DIABETES MELLITUS WITHOUT COMPLICATION, WITH LONG-TERM CURRENT USE OF INSULIN: ICD-10-CM

## 2019-12-05 DIAGNOSIS — E11.40 DIABETIC NEUROPATHY, PAINFUL (HCC): ICD-10-CM

## 2019-12-05 DIAGNOSIS — M79.7 FIBROMYALGIA: ICD-10-CM

## 2019-12-05 RX ORDER — LOSARTAN POTASSIUM AND HYDROCHLOROTHIAZIDE 25; 100 MG/1; MG/1
1 TABLET ORAL DAILY
Qty: 90 TABLET | Refills: 3 | Status: SHIPPED | OUTPATIENT
Start: 2019-12-05 | End: 2021-03-15 | Stop reason: SDUPTHER

## 2019-12-05 RX ORDER — EMPAGLIFLOZIN 10 MG/1
TABLET, FILM COATED ORAL
Qty: 90 TABLET | Refills: 1 | Status: SHIPPED | OUTPATIENT
Start: 2019-12-05 | End: 2020-06-11

## 2019-12-07 RX ORDER — PREGABALIN 150 MG/1
150 CAPSULE ORAL 2 TIMES DAILY
Qty: 60 CAPSULE | Refills: 0 | Status: SHIPPED | OUTPATIENT
Start: 2019-12-07 | End: 2020-03-20

## 2019-12-16 ENCOUNTER — TELEPHONE (OUTPATIENT)
Dept: INTERNAL MEDICINE | Facility: CLINIC | Age: 64
End: 2019-12-16

## 2019-12-16 ENCOUNTER — TRANSCRIBE ORDERS (OUTPATIENT)
Dept: ADMINISTRATIVE | Facility: HOSPITAL | Age: 64
End: 2019-12-16

## 2019-12-16 DIAGNOSIS — Z12.31 BREAST CANCER SCREENING BY MAMMOGRAM: Primary | ICD-10-CM

## 2019-12-19 ENCOUNTER — HOSPITAL ENCOUNTER (OUTPATIENT)
Dept: MAMMOGRAPHY | Facility: HOSPITAL | Age: 64
Discharge: HOME | End: 2019-12-19
Payer: COMMERCIAL

## 2019-12-19 DIAGNOSIS — Z12.31 BREAST CANCER SCREENING BY MAMMOGRAM: ICD-10-CM

## 2019-12-19 PROCEDURE — 77067 SCR MAMMO BI INCL CAD: CPT

## 2020-01-09 DIAGNOSIS — IMO0001 UNCONTROLLED TYPE 2 DIABETES MELLITUS WITHOUT COMPLICATION, WITH LONG-TERM CURRENT USE OF INSULIN: ICD-10-CM

## 2020-02-05 ENCOUNTER — APPOINTMENT (OUTPATIENT)
Dept: MAMMOGRAPHY | Facility: HOSPITAL | Age: 65
End: 2020-02-05

## 2020-03-14 DIAGNOSIS — F51.01 PRIMARY INSOMNIA: ICD-10-CM

## 2020-03-16 RX ORDER — PRAZOSIN HYDROCHLORIDE 1 MG/1
CAPSULE ORAL
Qty: 30 CAPSULE | Refills: 0 | Status: SHIPPED | OUTPATIENT
Start: 2020-03-16 | End: 2020-06-11

## 2020-03-19 DIAGNOSIS — M79.7 FIBROMYALGIA: ICD-10-CM

## 2020-03-19 DIAGNOSIS — E11.40 DIABETIC NEUROPATHY, PAINFUL (HCC): ICD-10-CM

## 2020-03-20 RX ORDER — PREGABALIN 150 MG/1
CAPSULE ORAL
Qty: 60 CAPSULE | Refills: 0 | Status: SHIPPED | OUTPATIENT
Start: 2020-03-20 | End: 2020-06-11

## 2020-03-30 DIAGNOSIS — H93.8X9 SENSATION OF FULLNESS IN EAR, UNSPECIFIED LATERALITY: Primary | ICD-10-CM

## 2020-03-30 RX ORDER — IPRATROPIUM BROMIDE 21 UG/1
2 SPRAY, METERED NASAL EVERY 12 HOURS
Qty: 30 ML | Refills: 2 | Status: SHIPPED | OUTPATIENT
Start: 2020-03-30 | End: 2021-06-25

## 2020-03-30 RX ORDER — LORATADINE 10 MG/1
10 TABLET ORAL DAILY
Qty: 30 TABLET | Refills: 1 | Status: SHIPPED | OUTPATIENT
Start: 2020-03-30 | End: 2021-06-25

## 2020-06-08 DIAGNOSIS — E11.40 DIABETIC NEUROPATHY, PAINFUL (HCC): ICD-10-CM

## 2020-06-08 DIAGNOSIS — M79.7 FIBROMYALGIA: ICD-10-CM

## 2020-06-11 DIAGNOSIS — E11.65 TYPE 2 DIABETES MELLITUS WITH HYPERGLYCEMIA, WITH LONG-TERM CURRENT USE OF INSULIN (HCC): ICD-10-CM

## 2020-06-11 DIAGNOSIS — Z79.4 UNCONTROLLED TYPE 2 DIABETES MELLITUS WITH HYPERGLYCEMIA, WITH LONG-TERM CURRENT USE OF INSULIN (HCC): ICD-10-CM

## 2020-06-11 DIAGNOSIS — M79.7 FIBROMYALGIA: ICD-10-CM

## 2020-06-11 DIAGNOSIS — F51.01 PRIMARY INSOMNIA: ICD-10-CM

## 2020-06-11 DIAGNOSIS — Z79.4 TYPE 2 DIABETES MELLITUS WITH HYPERGLYCEMIA, WITH LONG-TERM CURRENT USE OF INSULIN (HCC): ICD-10-CM

## 2020-06-11 DIAGNOSIS — M25.50 ARTHRALGIA, UNSPECIFIED JOINT: ICD-10-CM

## 2020-06-11 DIAGNOSIS — E11.65 UNCONTROLLED TYPE 2 DIABETES MELLITUS WITH HYPERGLYCEMIA, WITH LONG-TERM CURRENT USE OF INSULIN (HCC): ICD-10-CM

## 2020-06-11 RX ORDER — MELOXICAM 7.5 MG/1
7.5 TABLET ORAL DAILY
Qty: 30 TABLET | Refills: 0 | OUTPATIENT
Start: 2020-06-11

## 2020-06-11 RX ORDER — PREGABALIN 150 MG/1
CAPSULE ORAL
Qty: 60 CAPSULE | Refills: 0 | Status: SHIPPED | OUTPATIENT
Start: 2020-06-11 | End: 2021-03-19

## 2020-06-11 RX ORDER — EMPAGLIFLOZIN 10 MG/1
TABLET, FILM COATED ORAL
Qty: 90 TABLET | Refills: 0 | Status: SHIPPED | OUTPATIENT
Start: 2020-06-11 | End: 2020-09-18 | Stop reason: SDUPTHER

## 2020-06-11 RX ORDER — PRAZOSIN HYDROCHLORIDE 1 MG/1
CAPSULE ORAL
Qty: 30 CAPSULE | Refills: 0 | Status: SHIPPED | OUTPATIENT
Start: 2020-06-11 | End: 2021-06-25

## 2020-06-11 RX ORDER — INSULIN ASPART 100 [IU]/ML
INJECTION, SOLUTION INTRAVENOUS; SUBCUTANEOUS
Qty: 15 ML | Refills: 0 | Status: SHIPPED | OUTPATIENT
Start: 2020-06-11 | End: 2020-06-15

## 2020-06-11 RX ORDER — PRAZOSIN HYDROCHLORIDE 1 MG/1
1 CAPSULE ORAL NIGHTLY
Qty: 30 CAPSULE | Refills: 0 | OUTPATIENT
Start: 2020-06-11

## 2020-06-11 RX ORDER — CYCLOBENZAPRINE HCL 5 MG
TABLET ORAL
Qty: 30 TABLET | Refills: 0 | Status: SHIPPED | OUTPATIENT
Start: 2020-06-11 | End: 2020-09-18 | Stop reason: SDUPTHER

## 2020-06-11 RX ORDER — MELOXICAM 7.5 MG/1
TABLET ORAL
Qty: 30 TABLET | Refills: 0 | Status: SHIPPED | OUTPATIENT
Start: 2020-06-11 | End: 2020-09-18 | Stop reason: SDUPTHER

## 2020-06-11 RX ORDER — CYCLOBENZAPRINE HCL 5 MG
5 TABLET ORAL 2 TIMES DAILY PRN
Qty: 30 TABLET | Refills: 0 | OUTPATIENT
Start: 2020-06-11

## 2020-06-14 DIAGNOSIS — Z79.4 TYPE 2 DIABETES MELLITUS WITH HYPERGLYCEMIA, WITH LONG-TERM CURRENT USE OF INSULIN (HCC): ICD-10-CM

## 2020-06-14 DIAGNOSIS — E11.65 TYPE 2 DIABETES MELLITUS WITH HYPERGLYCEMIA, WITH LONG-TERM CURRENT USE OF INSULIN (HCC): ICD-10-CM

## 2020-06-15 DIAGNOSIS — E11.65 TYPE 2 DIABETES MELLITUS WITH HYPERGLYCEMIA, WITH LONG-TERM CURRENT USE OF INSULIN (HCC): ICD-10-CM

## 2020-06-15 DIAGNOSIS — Z79.4 TYPE 2 DIABETES MELLITUS WITH HYPERGLYCEMIA, WITH LONG-TERM CURRENT USE OF INSULIN (HCC): ICD-10-CM

## 2020-06-15 RX ORDER — INSULIN ASPART 100 [IU]/ML
INJECTION, SOLUTION INTRAVENOUS; SUBCUTANEOUS
Qty: 15 ML | Refills: 1 | Status: SHIPPED | OUTPATIENT
Start: 2020-06-15 | End: 2020-06-15 | Stop reason: SDUPTHER

## 2020-06-19 ENCOUNTER — OFFICE VISIT (OUTPATIENT)
Dept: INTERNAL MEDICINE | Facility: CLINIC | Age: 65
End: 2020-06-19

## 2020-06-19 VITALS
RESPIRATION RATE: 15 BRPM | DIASTOLIC BLOOD PRESSURE: 73 MMHG | HEART RATE: 73 BPM | SYSTOLIC BLOOD PRESSURE: 137 MMHG | HEIGHT: 62 IN | OXYGEN SATURATION: 100 % | WEIGHT: 189 LBS | TEMPERATURE: 97.8 F | BODY MASS INDEX: 34.78 KG/M2

## 2020-06-19 DIAGNOSIS — H61.21 IMPACTED CERUMEN OF RIGHT EAR: ICD-10-CM

## 2020-06-19 DIAGNOSIS — Z79.4 CONTROLLED TYPE 2 DIABETES MELLITUS WITH HYPERGLYCEMIA, WITH LONG-TERM CURRENT USE OF INSULIN (HCC): Primary | ICD-10-CM

## 2020-06-19 DIAGNOSIS — H93.8X1 SENSATION OF FULLNESS IN RIGHT EAR: ICD-10-CM

## 2020-06-19 DIAGNOSIS — R93.7 ABNORMAL BONE DENSITY SCREENING: ICD-10-CM

## 2020-06-19 DIAGNOSIS — J30.9 ALLERGIC RHINITIS, UNSPECIFIED SEASONALITY, UNSPECIFIED TRIGGER: ICD-10-CM

## 2020-06-19 DIAGNOSIS — E11.65 CONTROLLED TYPE 2 DIABETES MELLITUS WITH HYPERGLYCEMIA, WITH LONG-TERM CURRENT USE OF INSULIN (HCC): Primary | ICD-10-CM

## 2020-06-19 DIAGNOSIS — M85.89 OSTEOPENIA OF MULTIPLE SITES: ICD-10-CM

## 2020-06-19 LAB — HBA1C MFR BLD: 6.4 %

## 2020-06-19 PROCEDURE — 99214 OFFICE O/P EST MOD 30 MIN: CPT | Performed by: NURSE PRACTITIONER

## 2020-06-19 PROCEDURE — 83036 HEMOGLOBIN GLYCOSYLATED A1C: CPT | Performed by: NURSE PRACTITIONER

## 2020-06-19 PROCEDURE — 69209 REMOVE IMPACTED EAR WAX UNI: CPT | Performed by: NURSE PRACTITIONER

## 2020-06-19 RX ORDER — FLUTICASONE PROPIONATE 50 MCG
2 SPRAY, SUSPENSION (ML) NASAL DAILY
Qty: 15.8 ML | Refills: 1 | Status: SHIPPED | OUTPATIENT
Start: 2020-06-19 | End: 2021-06-25

## 2020-06-19 RX ORDER — INSULIN ASPART 100 [IU]/ML
INJECTION, SOLUTION INTRAVENOUS; SUBCUTANEOUS
COMMUNITY
Start: 2020-06-15 | End: 2020-09-18 | Stop reason: SDUPTHER

## 2020-06-19 NOTE — PROGRESS NOTES
Chief Complaint / Reason:      Chief Complaint   Patient presents with   • Diabetes       Subjective     HPI  Patient presents today for follow-up regarding diabetes she states overall she has been doing well.  She has lost weight but has tried to be more active and conscientious of what she is eating.  She denies chest pain, shortness of breath or heart palpitations vital signs are stable.  She states she still has some issues with her ear and feels fullness she had did try taking the previous medication but it provided only minimal relief.  Patient does have seasonal allergies and denies fever or chills.  History taken from: patient    PMH/FH/Social History were reviewed and updated appropriately in the electronic medical record.   Past Medical History:   Diagnosis Date   • Abdominal bloating    • Abdominal pain    • BMI 34.0-34.9,adult    • Diabetes mellitus (CMS/[facility])    • Diarrhea    • Encounter for long-term (current) use of medications    • Fatigue    • Hyperlipidemia    • Hypertension    • Marked eosinophilia    • Myalgia and myositis    • Nausea    • Reaction to chronic stress    • RUQ abdominal pain    • Viral gastroenteritis      Past Surgical History:   Procedure Laterality Date   • BILATERAL OOPHORECTOMY     • BLADDER SURGERY      Bladder tack : Mckay Marzetti procedure   • BREAST BIOPSY     • COLONOSCOPY      march 2015   • HYSTERECTOMY       Social History     Socioeconomic History   • Marital status:      Spouse name: Not on file   • Number of children: Not on file   • Years of education: Not on file   • Highest education level: Not on file   Tobacco Use   • Smoking status: Never Smoker   • Smokeless tobacco: Never Used   Substance and Sexual Activity   • Alcohol use: No   • Drug use: No   • Sexual activity: Yes     Partners: Male     Family History   Problem Relation Age of Onset   • Arthritis Mother    • Hypertension Mother    • Stroke Mother    • Hyperlipidemia Mother    • Cancer Father     • Lung cancer Father    • No Known Problems Sister    • Lymphoma Maternal Uncle    • Cancer Paternal Uncle    • Lung cancer Paternal Uncle    • No Known Problems Sister    • Cancer Maternal Uncle    • Lung cancer Maternal Uncle        Review of Systems:   Review of Systems   Constitutional: Positive for fatigue.   HENT: Positive for ear pain.    Respiratory: Negative.    Cardiovascular: Negative.    Allergic/Immunologic: Positive for environmental allergies.   Neurological: Negative.    Psychiatric/Behavioral: Negative.  Negative for stress.         All other systems were reviewed and are negative.  Exceptions are noted in the subjective or above.      Objective     Vital Signs  Vitals:    06/19/20 0946   BP: 137/73   Pulse: 73   Resp: 15   Temp: 97.8 °F (36.6 °C)   SpO2: 100%       Body mass index is 34.57 kg/m².    Physical Exam   Constitutional: She is oriented to person, place, and time. She appears well-developed and well-nourished. No distress.   HENT:   Head: Normocephalic and atraumatic.   Right Ear: External ear and ear canal normal. Tympanic membrane is erythematous and bulging. cerumen impaction (Cerumen impaction removed to visualize TM) is present.  Left Ear: External ear and ear canal normal. Tympanic membrane is erythematous and bulging.   Nose: Mucosal edema and rhinorrhea (clear nasal secretions) present. No sinus tenderness. Right sinus exhibits no maxillary sinus tenderness and no frontal sinus tenderness. Left sinus exhibits no maxillary sinus tenderness and no frontal sinus tenderness.   Mouth/Throat: Mucous membranes are dry. Posterior oropharyngeal erythema present.   PND   Eyes: Conjunctivae are normal.   Cardiovascular: Normal rate, regular rhythm and normal heart sounds.   Pulmonary/Chest: Effort normal and breath sounds normal. No respiratory distress.   Lymphadenopathy:        Head (right side): No submental, no submandibular and no tonsillar adenopathy present.        Head (left  side): No submental, no submandibular and no tonsillar adenopathy present.     She has no cervical adenopathy.   Neurological: She is alert and oriented to person, place, and time.   Skin: Skin is warm and dry. Capillary refill takes less than 2 seconds. No rash noted.   Psychiatric: She has a normal mood and affect. Her behavior is normal. Judgment and thought content normal.   Nursing note and vitals reviewed.        Ear Cerumen Removal  Date/Time: 6/19/2020 10:13 AM  Performed by: Carolina Soto APRN  Authorized by: Carolina Soto APRN   Consent: Verbal consent obtained.  Risks and benefits: risks, benefits and alternatives were discussed  Consent given by: patient  Patient understanding: patient states understanding of the procedure being performed  Patient identity confirmed: verbally with patient    Anesthesia:  Local Anesthetic: none  Location details: right ear  Patient tolerance: Patient tolerated the procedure well with no immediate complications  Comments: Moderate amount removed from right ear   Procedure type: irrigation   Sedation:  Patient sedated: no             Results Review:    I reviewed the patient's new clinical results.     Lab Results   Component Value Date    HGBA1C 6.4 06/19/2020    HGBA1C 6.6 10/09/2019    HGBA1C 6.7 05/15/2019       Medication Review:     Current Outpatient Medications:   •  aspirin 81 MG EC tablet, Take 81 mg by mouth Daily., Disp: , Rfl:   •  Blood Glucose Monitoring Suppl w/Device kit, Check twice daily, Disp: 1 each, Rfl: 0  •  cyclobenzaprine (FLEXERIL) 5 MG tablet, TAKE ONE TABLET BY MOUTH TWICE A DAY AS NEEDED FOR MUSCLE SPASMS, Disp: 30 tablet, Rfl: 0  •  glucose blood (ACCU-CHEK FRANCISCO PLUS) test strip, Use as instructed, Disp: 100 each, Rfl: 12  •  glucose blood test strip, Test three times daily, Disp: 200 each, Rfl: 3  •  Insulin Degludec (TRESIBA FLEXTOUCH) 200 UNIT/ML solution pen-injector pen injection, INJECT 60 UNITS UNDER THE SKIN INTO THE  APPROPRIATE AREA AS DIRECTED EVERY NIGHT, Disp: 27 pen, Rfl: 7  •  Insulin Pen Needle (B-D UF III MINI PEN NEEDLES) 31G X 5 MM misc, USE ONE PEN NEEDLE TO GIVE INSULIN SHOTS FOUR TIMES A DAY, Disp: 200 each, Rfl: 11  •  JARDIANCE 10 MG tablet, TAKE ONE TABLET BY MOUTH DAILY, Disp: 90 tablet, Rfl: 0  •  Lancets misc, Check three times daily, Disp: 200 each, Rfl: 2  •  losartan-hydrochlorothiazide (HYZAAR) 100-25 MG per tablet, Take 1 tablet by mouth Daily., Disp: 90 tablet, Rfl: 3  •  meloxicam (MOBIC) 7.5 MG tablet, TAKE ONE TABLET BY MOUTH DAILY, Disp: 30 tablet, Rfl: 0  •  metoprolol succinate XL (TOPROL-XL) 25 MG 24 hr tablet, TAKE 1 TABLET DAILY, Disp: 90 tablet, Rfl: 1  •  Omega-3-Acid Eth Est, Dietary, 1 g capsule, Take  by mouth., Disp: , Rfl:   •  prazosin (MINIPRESS) 1 MG capsule, TAKE ONE CAPSULE BY MOUTH ONCE NIGHTLY, Disp: 30 capsule, Rfl: 0  •  pregabalin (LYRICA) 150 MG capsule, TAKE 1 CAPSULE BY MOUTH TWICE DAILY, Disp: 60 capsule, Rfl: 0  •  fluticasone (Flonase) 50 MCG/ACT nasal spray, 2 sprays into the nostril(s) as directed by provider Daily., Disp: 15.8 mL, Rfl: 1  •  glucose blood test strip, Use tid. E11.9, Disp: 300 each, Rfl: 12  •  ipratropium (Atrovent) 0.03 % nasal spray, 2 sprays into the nostril(s) as directed by provider Every 12 (Twelve) Hours., Disp: 30 mL, Rfl: 2  •  loratadine (Claritin) 10 MG tablet, Take 1 tablet by mouth Daily., Disp: 30 tablet, Rfl: 1  •  NOVOLOG FLEXPEN 100 UNIT/ML solution pen-injector sc pen, , Disp: , Rfl:     Assessment/Plan   Mercy was seen today for diabetes.    Diagnoses and all orders for this visit:    Controlled type 2 diabetes mellitus with hyperglycemia, with long-term current use of insulin (CMS/[facility])  -     glucose blood test strip; Use tid. E11.9  -     POC Glycosylated Hemoglobin (Hb A1C)    Abnormal bone density screening  -     DEXA Bone Density Axial    Osteopenia of multiple sites  -     DEXA Bone Density Axial    Sensation of fullness in  right ear  -     fluticasone (Flonase) 50 MCG/ACT nasal spray; 2 sprays into the nostril(s) as directed by provider Daily.    Allergic rhinitis, unspecified seasonality, unspecified trigger  -     fluticasone (Flonase) 50 MCG/ACT nasal spray; 2 sprays into the nostril(s) as directed by provider Daily.    Impacted cerumen of right ear  -     Ear Cerumen Removal        Return in about 3 months (around 9/19/2020), or if symptoms worsen or fail to improve, for Annual.    Carolina Soto, APRN  06/19/2020

## 2020-06-25 ENCOUNTER — APPOINTMENT (OUTPATIENT)
Dept: BONE DENSITY | Facility: HOSPITAL | Age: 65
End: 2020-06-25

## 2020-06-25 PROCEDURE — 77080 DXA BONE DENSITY AXIAL: CPT

## 2020-06-29 DIAGNOSIS — M81.0 OSTEOPOROSIS WITHOUT CURRENT PATHOLOGICAL FRACTURE, UNSPECIFIED OSTEOPOROSIS TYPE: Primary | ICD-10-CM

## 2020-06-29 DIAGNOSIS — M25.50 ARTHRALGIA, UNSPECIFIED JOINT: ICD-10-CM

## 2020-06-29 DIAGNOSIS — R93.7 ABNORMAL BONE DENSITY SCREENING: ICD-10-CM

## 2020-08-18 ENCOUNTER — E-VISIT (OUTPATIENT)
Dept: INTERNAL MEDICINE | Facility: CLINIC | Age: 65
End: 2020-08-18

## 2020-08-18 DIAGNOSIS — R30.0 DYSURIA: Primary | ICD-10-CM

## 2020-08-18 PROCEDURE — 99421 OL DIG E/M SVC 5-10 MIN: CPT | Performed by: NURSE PRACTITIONER

## 2020-08-19 RX ORDER — NITROFURANTOIN 25; 75 MG/1; MG/1
100 CAPSULE ORAL 2 TIMES DAILY
Qty: 14 CAPSULE | Refills: 0 | Status: SHIPPED | OUTPATIENT
Start: 2020-08-19 | End: 2020-08-26

## 2020-08-19 NOTE — PROGRESS NOTES
Mercy FONTANEZ Jayleen    1955  [id number]    I have reviewed the e-Visit questionnaire and patient's answers, my assessment and plan are as follows:    HPI  E-Visit Submission: Urinary Problems  --------------------------------    Question: What state are you submitting this questionnaire from?  Answer[place]   [facility]    Question: Do you currently have a new onset of the following symptoms?  Answer:   None    Question: In the last 14 day, have you had contact with anyone who is ill, has shown any of the symptoms listed above and/or has been diagnosed with 2019 Novel Coronavirus? This includes any immediate household member but excludes any patients with whom you have been in contact within your normal work duties wearing proper PPE, if you are a healthcare worker.  Answer:   No    Question: Which of the following are you experiencing?  Answer:   Pain while passing urine             Urine odor    Question: How long have you experienced these symptoms?  Answer:   1-4 days    Question: Do you have a fever?  Answer:   No, I do not have a fever    Question: Do you have any of the following?  Answer:   I have none of these problems    Question: Do you have any sores on your genitals?  Answer:   No    Question: Have you had any kidney problems in the past?  Answer:   No    Question: Within the past 3 months, have you had any surgery on your kidneys or bladder, or have you had a tube inserted to collect your urine?  Answer:   No, I have never had either    Question: Have you been on any antibiotics within the last 3 months?  Answer:   No    Question: Have you had similar symptoms in the past?  Answer:   Yes, I have had similar symptoms before    Question: For your similar symptoms in the past, did any of the following work?  Answer:   Pills for urine infection    Question: Anything else you would like to add?  Answer:   [facility] was not an option for pharmacy but is where I would like a prescription called to. Symptoms started  yesterday and painful urinating started last night late with odor of urine too.     Review of Systems - dysuria and urine odor without fever or chills         There are no diagnoses linked to this encounter.    Any medications prescribed have been sent electronically to   [facility] - [place], [place] - [address] AT [address] [facility] - [phone]  - [phone] FX  [address]  [facility] [address]  Phone: [phone] Fax: [phone]    [address] - [address] [place] [address] - [phone]  - [phone] FX  [address] [facility] [address]  Phone: [phone] Fax: [phone]        Carolina Soto, LARA  08/19/20  10:39 AM    Time Documentation:

## 2020-09-18 DIAGNOSIS — M25.50 ARTHRALGIA, UNSPECIFIED JOINT: ICD-10-CM

## 2020-09-18 DIAGNOSIS — Z79.4 UNCONTROLLED TYPE 2 DIABETES MELLITUS WITH HYPERGLYCEMIA, WITH LONG-TERM CURRENT USE OF INSULIN (HCC): ICD-10-CM

## 2020-09-18 DIAGNOSIS — E11.65 UNCONTROLLED TYPE 2 DIABETES MELLITUS WITH HYPERGLYCEMIA, WITH LONG-TERM CURRENT USE OF INSULIN (HCC): ICD-10-CM

## 2020-09-18 DIAGNOSIS — M79.7 FIBROMYALGIA: ICD-10-CM

## 2020-09-18 DIAGNOSIS — F51.01 PRIMARY INSOMNIA: ICD-10-CM

## 2020-09-18 RX ORDER — EMPAGLIFLOZIN 10 MG/1
1 TABLET, FILM COATED ORAL DAILY
Qty: 90 TABLET | Refills: 0 | Status: SHIPPED | OUTPATIENT
Start: 2020-09-18 | End: 2020-12-28 | Stop reason: SDUPTHER

## 2020-09-18 RX ORDER — FLURBIPROFEN SODIUM 0.3 MG/ML
SOLUTION/ DROPS OPHTHALMIC
Qty: 200 EACH | Refills: 11 | Status: SHIPPED | OUTPATIENT
Start: 2020-09-18 | End: 2021-06-29 | Stop reason: SDUPTHER

## 2020-09-18 RX ORDER — MELOXICAM 7.5 MG/1
7.5 TABLET ORAL DAILY
Qty: 30 TABLET | Refills: 0 | Status: SHIPPED | OUTPATIENT
Start: 2020-09-18 | End: 2020-09-21 | Stop reason: SDUPTHER

## 2020-09-18 RX ORDER — CYCLOBENZAPRINE HCL 5 MG
5 TABLET ORAL 2 TIMES DAILY PRN
Qty: 30 TABLET | Refills: 0 | Status: SHIPPED | OUTPATIENT
Start: 2020-09-18 | End: 2020-10-22 | Stop reason: SDUPTHER

## 2020-09-18 RX ORDER — INSULIN ASPART 100 [IU]/ML
17 INJECTION, SOLUTION INTRAVENOUS; SUBCUTANEOUS
Qty: 3 ML | Refills: 1 | Status: SHIPPED | OUTPATIENT
Start: 2020-09-18 | End: 2020-09-21

## 2020-09-19 DIAGNOSIS — M25.50 ARTHRALGIA, UNSPECIFIED JOINT: ICD-10-CM

## 2020-09-19 DIAGNOSIS — M79.7 FIBROMYALGIA: ICD-10-CM

## 2020-09-19 RX ORDER — MELOXICAM 7.5 MG/1
TABLET ORAL
Qty: 30 TABLET | Refills: 0 | OUTPATIENT
Start: 2020-09-19

## 2020-09-19 RX ORDER — MELOXICAM 7.5 MG/1
7.5 TABLET ORAL DAILY
Qty: 30 TABLET | Refills: 0 | OUTPATIENT
Start: 2020-09-19

## 2020-09-21 ENCOUNTER — HOSPITAL ENCOUNTER (OUTPATIENT)
Dept: GENERAL RADIOLOGY | Facility: HOSPITAL | Age: 65
Discharge: HOME | End: 2020-09-21
Admitting: NURSE PRACTITIONER

## 2020-09-21 ENCOUNTER — OFFICE VISIT (OUTPATIENT)
Dept: INTERNAL MEDICINE | Facility: CLINIC | Age: 65
End: 2020-09-21

## 2020-09-21 VITALS
SYSTOLIC BLOOD PRESSURE: 162 MMHG | BODY MASS INDEX: 33.31 KG/M2 | OXYGEN SATURATION: 100 % | RESPIRATION RATE: 15 BRPM | HEIGHT: 62 IN | WEIGHT: 181 LBS | TEMPERATURE: 97.3 F | HEART RATE: 71 BPM | DIASTOLIC BLOOD PRESSURE: 87 MMHG

## 2020-09-21 DIAGNOSIS — M79.7 FIBROMYALGIA: ICD-10-CM

## 2020-09-21 DIAGNOSIS — R07.81 RIB PAIN ON RIGHT SIDE: ICD-10-CM

## 2020-09-21 DIAGNOSIS — E55.9 VITAMIN D DEFICIENCY: ICD-10-CM

## 2020-09-21 DIAGNOSIS — I10 BENIGN ESSENTIAL HYPERTENSION: ICD-10-CM

## 2020-09-21 DIAGNOSIS — E11.65 TYPE 2 DIABETES MELLITUS WITH HYPERGLYCEMIA, WITH LONG-TERM CURRENT USE OF INSULIN (HCC): ICD-10-CM

## 2020-09-21 DIAGNOSIS — Z23 NEED FOR TDAP VACCINATION: ICD-10-CM

## 2020-09-21 DIAGNOSIS — Z00.00 PHYSICAL EXAM, ANNUAL: Primary | ICD-10-CM

## 2020-09-21 DIAGNOSIS — Z79.4 TYPE 2 DIABETES MELLITUS WITH HYPERGLYCEMIA, WITH LONG-TERM CURRENT USE OF INSULIN (HCC): ICD-10-CM

## 2020-09-21 DIAGNOSIS — M25.50 ARTHRALGIA, UNSPECIFIED JOINT: ICD-10-CM

## 2020-09-21 DIAGNOSIS — R53.83 FATIGUE, UNSPECIFIED TYPE: ICD-10-CM

## 2020-09-21 DIAGNOSIS — E78.2 MIXED HYPERLIPIDEMIA: ICD-10-CM

## 2020-09-21 LAB
ALBUMIN SERPL-MCNC: 4.9 G/DL (ref 3.5–5.2)
ALBUMIN/GLOB SERPL: 2.9 G/DL
ALP SERPL-CCNC: 68 U/L (ref 39–117)
ALT SERPL-CCNC: 33 U/L (ref 1–33)
AST SERPL-CCNC: 28 U/L (ref 1–32)
BASOPHILS # BLD AUTO: 0.05 10*3/MM3 (ref 0–0.2)
BASOPHILS NFR BLD AUTO: 0.5 % (ref 0–1.5)
BILIRUB SERPL-MCNC: 1 MG/DL (ref 0–1.2)
BUN SERPL-MCNC: 15 MG/DL (ref 8–23)
BUN/CREAT SERPL: 18.3 (ref 7–25)
CALCIUM SERPL-MCNC: 9.4 MG/DL (ref 8.6–10.5)
CHLORIDE SERPL-SCNC: 103 MMOL/L (ref 98–107)
CHOLEST SERPL-MCNC: 196 MG/DL (ref 0–200)
CO2 SERPL-SCNC: 27 MMOL/L (ref 22–29)
CREAT SERPL-MCNC: 0.82 MG/DL (ref 0.57–1)
EOSINOPHIL # BLD AUTO: 0.19 10*3/MM3 (ref 0–0.4)
EOSINOPHIL NFR BLD AUTO: 1.9 % (ref 0.3–6.2)
ERYTHROCYTE [DISTWIDTH] IN BLOOD BY AUTOMATED COUNT: 13 % (ref 12.3–15.4)
GLOBULIN SER CALC-MCNC: 1.7 GM/DL
GLUCOSE SERPL-MCNC: 114 MG/DL (ref 65–99)
HBA1C MFR BLD: 6.5 % (ref 4.8–5.6)
HCT VFR BLD AUTO: 51.5 % (ref 34–46.6)
HDLC SERPL-MCNC: 49 MG/DL (ref 40–60)
HGB BLD-MCNC: 18 G/DL (ref 12–15.9)
IMM GRANULOCYTES # BLD AUTO: 0.03 10*3/MM3 (ref 0–0.05)
IMM GRANULOCYTES NFR BLD AUTO: 0.3 % (ref 0–0.5)
LDLC SERPL CALC-MCNC: 123 MG/DL (ref 0–100)
LYMPHOCYTES # BLD AUTO: 1.86 10*3/MM3 (ref 0.7–3.1)
LYMPHOCYTES NFR BLD AUTO: 18.7 % (ref 19.6–45.3)
MCH RBC QN AUTO: 32 PG (ref 26.6–33)
MCHC RBC AUTO-ENTMCNC: 35 G/DL (ref 31.5–35.7)
MCV RBC AUTO: 91.6 FL (ref 79–97)
MONOCYTES # BLD AUTO: 0.79 10*3/MM3 (ref 0.1–0.9)
MONOCYTES NFR BLD AUTO: 7.9 % (ref 5–12)
NEUTROPHILS # BLD AUTO: 7.04 10*3/MM3 (ref 1.7–7)
NEUTROPHILS NFR BLD AUTO: 70.7 % (ref 42.7–76)
NRBC BLD AUTO-RTO: 0 /100 WBC (ref 0–0.2)
PLATELET # BLD AUTO: 225 10*3/MM3 (ref 140–450)
POTASSIUM SERPL-SCNC: 4.8 MMOL/L (ref 3.5–5.2)
PROT SERPL-MCNC: 6.6 G/DL (ref 6–8.5)
RBC # BLD AUTO: 5.62 10*6/MM3 (ref 3.77–5.28)
SODIUM SERPL-SCNC: 141 MMOL/L (ref 136–145)
T4 FREE SERPL-MCNC: 1.52 NG/DL (ref 0.93–1.7)
TRIGL SERPL-MCNC: 120 MG/DL (ref 0–150)
TSH SERPL DL<=0.005 MIU/L-ACNC: 0.88 UIU/ML (ref 0.27–4.2)
VIT B12 SERPL-MCNC: 1624 PG/ML (ref 211–946)
VLDLC SERPL CALC-MCNC: 24 MG/DL
WBC # BLD AUTO: 9.96 10*3/MM3 (ref 3.4–10.8)

## 2020-09-21 PROCEDURE — 90471 IMMUNIZATION ADMIN: CPT | Performed by: NURSE PRACTITIONER

## 2020-09-21 PROCEDURE — 90715 TDAP VACCINE 7 YRS/> IM: CPT | Performed by: NURSE PRACTITIONER

## 2020-09-21 PROCEDURE — 99396 PREV VISIT EST AGE 40-64: CPT | Performed by: NURSE PRACTITIONER

## 2020-09-21 PROCEDURE — 71046 X-RAY EXAM CHEST 2 VIEWS: CPT

## 2020-09-21 RX ORDER — MELOXICAM 7.5 MG/1
7.5 TABLET ORAL DAILY
Qty: 30 TABLET | Refills: 5 | Status: SHIPPED | OUTPATIENT
Start: 2020-09-21 | End: 2021-10-28

## 2020-09-21 RX ORDER — ALENDRONATE SODIUM 70 MG/1
TABLET ORAL
COMMUNITY
Start: 2020-08-11

## 2020-09-21 NOTE — PROGRESS NOTES
Please contact patient and let her know results show no acute abnormality but it did mention CT scan if symptoms persist for further evaluation.  It did show degenerative changes in spine.  I attempted to contact her but was unable to reach her as it stated voicemail was not set up.

## 2020-09-21 NOTE — PROGRESS NOTES
Chief Complaint   Patient presents with   • Annual Exam       Mercy Clemens is a 65 y.o. female and is here for a comprehensive physical exam. The patient reports no acute problems but states that she does have some on and off rib pain on the right side and she had been to the chiropractor in the past and they made an adjustment and she got home and felt like she could hardly move due to the sharp right-sided pain.  She states that it took a little while for it to go away but certain positions she gets in it worsen symptoms and she has point tenderness.  Denies any weight loss chest pain, shortness of breath or heart palpitations.  She is requesting refills on medication and she states overall she has been doing well.  She states that her blood sugar has been staying in normal range.  Lab Results   Component Value Date    HGBA1C 6.4 2020    HGBA1C 6.6 10/09/2019    HGBA1C 6.7 05/15/2019        History:  LMP: No LMP recorded.  Menopause at   Last pap date: NA  Abnormal pap? no  : 2  Para: 2  STD:none  Age of menarche:14  Sexually active:19  Abuse:none        Do you take any herbs or supplements that were not prescribed by a doctor? yes  Are you taking calcium supplements?yes   Are you taking aspirin daily? yes      Health Habits:  Dental Exam. up to date  Eye Exam. up to date  Exercise: 5 times/week.  Current exercise activities include: walking    Health Maintenance   Topic Date Due   • TDAP/TD VACCINES (1 - Tdap) 1975   • ZOSTER VACCINE (1 of 2) 2006   • URINE MICROALBUMIN  2020   • DIABETIC FOOT EXAM  2020   • INFLUENZA VACCINE  2021 (Originally 2020)   • LIPID PANEL  2020   • HEMOGLOBIN A1C  2020   • DIABETIC EYE EXAM  2021   • ANNUAL PHYSICAL  2021   • MAMMOGRAM  2021   • DXA SCAN  2022   • [facility] AMB Pneumococcal Vaccine 65+ (1 of 1 - PPSV23) 10/20/2022   • COLONOSCOPY  02/10/2025   • PNEUMOCOCCAL VACCINE (19-64 MEDIUM RISK)   Completed   • [facility] Pneumococcal Vaccine 0-64  Completed   • HEPATITIS C SCREENING  Discontinued   • PAP SMEAR  Discontinued       PMH, PSH, SocHx, FamHx, Allergies, and Medications: Reviewed and updated in the Visit Navigator.     Allergies   Allergen Reactions   • Penicillins      Past Medical History:   Diagnosis Date   • Abdominal bloating    • Abdominal pain    • BMI 34.0-34.9,adult    • Diabetes mellitus (CMS/HCC)    • Diarrhea    • Encounter for long-term (current) use of medications    • Fatigue    • Hyperlipidemia    • Hypertension    • Marked eosinophilia    • Myalgia and myositis    • Nausea    • Reaction to chronic stress    • RUQ abdominal pain    • Viral gastroenteritis      Past Surgical History:   Procedure Laterality Date   • BILATERAL OOPHORECTOMY     • BLADDER SURGERY      Bladder tack : Mckay Marzetti procedure   • BREAST BIOPSY     • COLONOSCOPY      march 2015   • HYSTERECTOMY       Social History     Socioeconomic History   • Marital status:      Spouse name: Not on file   • Number of children: Not on file   • Years of education: Not on file   • Highest education level: Not on file   Tobacco Use   • Smoking status: Never Smoker   • Smokeless tobacco: Never Used   Substance and Sexual Activity   • Alcohol use: No   • Drug use: No   • Sexual activity: Yes     Partners: Male     Family History   Problem Relation Age of Onset   • Arthritis Mother    • Hypertension Mother    • Stroke Mother    • Hyperlipidemia Mother    • Cancer Father    • Lung cancer Father    • No Known Problems Sister    • Lymphoma Maternal Uncle    • Cancer Paternal Uncle    • Lung cancer Paternal Uncle    • No Known Problems Sister    • Cancer Maternal Uncle    • Lung cancer Maternal Uncle        Review of Systems  Review of Systems   Constitutional: Positive for fatigue. Negative for chills, fever, unexpected weight gain and unexpected weight loss.   HENT: Negative for congestion, ear pain, hearing loss,  "rhinorrhea, sinus pressure, sneezing, sore throat and trouble swallowing.    Eyes: Negative for discharge and itching.   Respiratory: Negative for cough, chest tightness and shortness of breath.         Tenderness right side   Cardiovascular: Negative for chest pain, palpitations and leg swelling.   Gastrointestinal: Negative for abdominal pain, blood in stool, constipation, diarrhea and vomiting.   Endocrine: Negative for polydipsia and polyuria.   Genitourinary: Negative for difficulty urinating, dysuria, frequency, hematuria, urgency and urinary incontinence.   Musculoskeletal: Negative for arthralgias, back pain, gait problem and joint swelling.   Skin: Negative for rash and bruise.   Allergic/Immunologic: Positive for environmental allergies. Negative for immunocompromised state.   Neurological: Negative for dizziness, syncope, weakness, light-headedness, numbness and headache.   Hematological: Does not bruise/bleed easily.   Psychiatric/Behavioral: Positive for sleep disturbance. Negative for behavioral problems, dysphoric mood and stress. The patient is not nervous/anxious.          Vitals:    09/21/20 0924   BP: 162/87   Pulse: 71   Resp: 15   Temp: 97.3 °F (36.3 °C)   SpO2: 100%       Objective   /87   Pulse 71   Temp 97.3 °F (36.3 °C)   Resp 15   Ht 157.5 cm (62\")   Wt 82.1 kg (181 lb)   SpO2 100%   BMI 33.11 kg/m²     Physical Exam  Vitals signs and nursing note reviewed.   Constitutional:       General: She is not in acute distress.     Appearance: Normal appearance. She is well-developed. She is obese. She is not ill-appearing.   HENT:      Head: Normocephalic and atraumatic.      Right Ear: Hearing, ear canal and external ear normal. Tympanic membrane is erythematous and bulging.      Left Ear: Hearing, ear canal and external ear normal. Tympanic membrane is erythematous and bulging.      Nose: Mucosal edema and rhinorrhea (clear nasal secretions) present.      Right Sinus: No maxillary " sinus tenderness or frontal sinus tenderness.      Left Sinus: No maxillary sinus tenderness or frontal sinus tenderness.      Mouth/Throat:      Mouth: Mucous membranes are dry.      Dentition: Normal dentition.      Pharynx: Posterior oropharyngeal erythema present.   Eyes:      Conjunctiva/sclera: Conjunctivae normal.      Pupils: Pupils are equal, round, and reactive to light.   Neck:      Musculoskeletal: Normal range of motion and neck supple.      Thyroid: No thyroid mass or thyromegaly.      Vascular: No carotid bruit or JVD.   Cardiovascular:      Rate and Rhythm: Normal rate and regular rhythm.      Pulses: Normal pulses.      Heart sounds: Normal heart sounds, S1 normal and S2 normal. No murmur.   Pulmonary:      Effort: Pulmonary effort is normal. No respiratory distress.      Breath sounds: Normal breath sounds.   Abdominal:      General: Bowel sounds are normal. There is no distension or abdominal bruit.      Palpations: Abdomen is soft. There is no mass.      Tenderness: There is no abdominal tenderness.   Genitourinary:     Comments: Deferred  Musculoskeletal: Normal range of motion.   Lymphadenopathy:      Head:      Right side of head: No submental, submandibular or tonsillar adenopathy.      Left side of head: No submental, submandibular or tonsillar adenopathy.      Cervical: No cervical adenopathy.   Skin:     General: Skin is warm and dry.      Capillary Refill: Capillary refill takes less than 2 seconds.      Findings: No rash.      Nails: There is no clubbing.     Neurological:      Mental Status: She is alert and oriented to person, place, and time. Mental status is at baseline.      Cranial Nerves: No cranial nerve deficit.      Sensory: Sensory deficit present.      Gait: Gait normal.   Psychiatric:         Behavior: Behavior normal.         Thought Content: Thought content normal.         Judgment: Judgment normal.              Assessment/Plan   1. Healthy female exam.    2. Patient  Counseling: Including but not Limited to the following, when appropriate:  --Nutrition: Stressed importance of moderation in sodium/caffeine intake, saturated fat and cholesterol, caloric balance, sufficient intake of fresh fruits, vegetables, fiber, calcium, iron, and 1 mg of folate supplement per day (for females capable of pregnancy).  --Discussed the issue of estrogen replacement, calcium supplement, and the daily use of baby aspirin.  --Exercise: Stressed the importance of regular exercise.   --Substance Abuse: Discussed cessation/primary prevention of tobacco, alcohol, or other drug use; driving or other dangerous activities under the influence; availability of treatment for abuse, as indicated based on social history.    --Sexuality: Discussed sexually transmitted diseases, partner selection, use of condoms, avoidance of unintended pregnancy  and contraceptive alternatives.   --Injury prevention: Discussed safety belts, safety helmets, smoke detector, smoking near bedding or upholstery.   --Dental health: Discussed importance of regular tooth brushing, flossing, and dental visits.  --Immunizations reviewed.  --Discussed benefits of colon cancer screening.      3. Discussed the patient's BMI with her.  The BMI is above average; BMI management plan is completed  4. Return in 3 months (on 12/21/2020), or if symptoms worsen or fail to improve.  5. Age-appropriate Screening Scheduled      Assessment/Plan     Mercy was seen today for annual exam.    Diagnoses and all orders for this visit:    Physical exam, annual  -     Comprehensive metabolic panel  -     Lipid panel  -     CBC w AUTO Differential    Fibromyalgia  -     meloxicam (MOBIC) 7.5 MG tablet; Take 1 tablet by mouth Daily.    Arthralgia, unspecified joint  -     meloxicam (MOBIC) 7.5 MG tablet; Take 1 tablet by mouth Daily.    Vitamin D deficiency  Recommend continuing vitamin D   Type 2 diabetes mellitus with hyperglycemia, with long-term current use  of insulin (CMS/HCC)  -     Hemoglobin A1c  Stable continue current medication regimen and adhere to diabetic cardiac diet.  Mixed hyperlipidemia  -     Lipid panel    Fatigue, unspecified type  -     Vitamin B12  -     TSH  -     T4, free  Discussed sleep hygiene  Rib pain on right side  -     XR Chest PA & Lateral  Discussed nonpharmacological interventions with patient recommend applying warm moist heat  Need for Tdap vaccination  -     Tdap Vaccine Greater Than or Equal To 6yo IM    Benign essential hypertension    Initiate lifestyle modifications.   DASH Diet and exercise.   Compliance with medication regimen and discussed ways to prevent of long-term complications from high blood pressure.  Discussed when to seek medical attention.  Encouraged patient to take blood pressure daily and keep a log.  Discussed medications that increase blood pressure and advised patient to closely monitor blood pressure at home and contact office if blood pressure remains elevated.               Carolina Soto, APRN 09/21/2020

## 2020-10-22 DIAGNOSIS — M79.7 FIBROMYALGIA: ICD-10-CM

## 2020-10-22 DIAGNOSIS — F51.01 PRIMARY INSOMNIA: ICD-10-CM

## 2020-10-22 RX ORDER — CYCLOBENZAPRINE HCL 5 MG
5 TABLET ORAL 2 TIMES DAILY PRN
Qty: 30 TABLET | Refills: 0 | Status: SHIPPED | OUTPATIENT
Start: 2020-10-22 | End: 2020-11-30

## 2020-11-26 DIAGNOSIS — I10 BENIGN ESSENTIAL HYPERTENSION: ICD-10-CM

## 2020-11-27 DIAGNOSIS — F51.01 PRIMARY INSOMNIA: ICD-10-CM

## 2020-11-27 DIAGNOSIS — M79.7 FIBROMYALGIA: ICD-10-CM

## 2020-11-30 RX ORDER — METOPROLOL SUCCINATE 25 MG/1
25 TABLET, EXTENDED RELEASE ORAL DAILY
Qty: 90 TABLET | Refills: 3 | Status: SHIPPED | OUTPATIENT
Start: 2020-11-30 | End: 2021-10-28 | Stop reason: SDUPTHER

## 2020-11-30 RX ORDER — CYCLOBENZAPRINE HCL 5 MG
TABLET ORAL
Qty: 30 TABLET | Refills: 5 | Status: SHIPPED | OUTPATIENT
Start: 2020-11-30 | End: 2021-10-28 | Stop reason: SDUPTHER

## 2020-12-28 RX ORDER — EMPAGLIFLOZIN 10 MG/1
1 TABLET, FILM COATED ORAL DAILY
Qty: 90 TABLET | Refills: 3 | Status: SHIPPED | OUTPATIENT
Start: 2020-12-28 | End: 2022-01-03 | Stop reason: SDUPTHER

## 2020-12-28 RX ORDER — EMPAGLIFLOZIN 10 MG/1
TABLET, FILM COATED ORAL
Qty: 30 TABLET | Refills: 0 | OUTPATIENT
Start: 2020-12-28

## 2021-03-15 DIAGNOSIS — Z79.4 TYPE 2 DIABETES MELLITUS WITH HYPERGLYCEMIA, WITH LONG-TERM CURRENT USE OF INSULIN (HCC): ICD-10-CM

## 2021-03-15 DIAGNOSIS — I10 BENIGN ESSENTIAL HYPERTENSION: ICD-10-CM

## 2021-03-15 DIAGNOSIS — E11.65 TYPE 2 DIABETES MELLITUS WITH HYPERGLYCEMIA, WITH LONG-TERM CURRENT USE OF INSULIN (HCC): ICD-10-CM

## 2021-03-15 RX ORDER — LOSARTAN POTASSIUM AND HYDROCHLOROTHIAZIDE 25; 100 MG/1; MG/1
1 TABLET ORAL DAILY
Qty: 90 TABLET | Refills: 3 | Status: SHIPPED | OUTPATIENT
Start: 2021-03-15 | End: 2021-10-28 | Stop reason: SDUPTHER

## 2021-03-15 RX ORDER — INSULIN ASPART 100 [IU]/ML
INJECTION, SOLUTION INTRAVENOUS; SUBCUTANEOUS
Qty: 15 ML | Refills: 1 | Status: SHIPPED | OUTPATIENT
Start: 2021-03-15 | End: 2021-06-25 | Stop reason: SDUPTHER

## 2021-03-18 ENCOUNTER — TELEPHONE (OUTPATIENT)
Dept: INTERNAL MEDICINE | Facility: CLINIC | Age: 66
End: 2021-03-18

## 2021-03-18 NOTE — TELEPHONE ENCOUNTER
PHARMACY CALLED STATED THAT PT WAS NOT AWARE THAT FOR RX NOVOLOG OIT WAS SUPPOSE TO CHANGE FORM FLEX TO EITHER SYRINGES AND VIALS, ALSO PHARMACY WOULD LIKE TO VERIFY DIRECTIONS.    PLEASE ADVISE.  CALL BACK:[phone]

## 2021-03-19 DIAGNOSIS — M79.7 FIBROMYALGIA: ICD-10-CM

## 2021-03-19 DIAGNOSIS — E11.40 DIABETIC NEUROPATHY, PAINFUL (HCC): ICD-10-CM

## 2021-03-19 RX ORDER — PREGABALIN 150 MG/1
CAPSULE ORAL
Qty: 60 CAPSULE | Refills: 0 | Status: SHIPPED | OUTPATIENT
Start: 2021-03-19 | End: 2021-09-13

## 2021-05-21 RX ORDER — INSULIN DEGLUDEC 200 U/ML
60 INJECTION, SOLUTION SUBCUTANEOUS NIGHTLY
Qty: 27 PEN | Refills: 3 | Status: SHIPPED | OUTPATIENT
Start: 2021-05-21 | End: 2021-07-23 | Stop reason: SDUPTHER

## 2021-05-21 RX ORDER — INSULIN DEGLUDEC 200 U/ML
INJECTION, SOLUTION SUBCUTANEOUS
Refills: 6 | OUTPATIENT
Start: 2021-05-21

## 2021-06-14 ENCOUNTER — TRANSCRIBE ORDERS (OUTPATIENT)
Dept: ADMINISTRATIVE | Facility: HOSPITAL | Age: 66
End: 2021-06-14

## 2021-06-14 DIAGNOSIS — Z12.31 VISIT FOR SCREENING MAMMOGRAM: Primary | ICD-10-CM

## 2021-06-25 ENCOUNTER — OFFICE VISIT (OUTPATIENT)
Dept: INTERNAL MEDICINE | Facility: CLINIC | Age: 66
End: 2021-06-25

## 2021-06-25 VITALS
BODY MASS INDEX: 33.68 KG/M2 | SYSTOLIC BLOOD PRESSURE: 151 MMHG | TEMPERATURE: 97.8 F | DIASTOLIC BLOOD PRESSURE: 63 MMHG | OXYGEN SATURATION: 99 % | HEART RATE: 73 BPM | HEIGHT: 62 IN | WEIGHT: 183 LBS | RESPIRATION RATE: 15 BRPM

## 2021-06-25 DIAGNOSIS — E78.2 MIXED HYPERLIPIDEMIA: ICD-10-CM

## 2021-06-25 DIAGNOSIS — E11.65 TYPE 2 DIABETES MELLITUS WITH HYPERGLYCEMIA, WITH LONG-TERM CURRENT USE OF INSULIN (HCC): ICD-10-CM

## 2021-06-25 DIAGNOSIS — M54.50 CHRONIC BILATERAL LOW BACK PAIN WITHOUT SCIATICA: ICD-10-CM

## 2021-06-25 DIAGNOSIS — G89.29 CHRONIC BILATERAL LOW BACK PAIN WITHOUT SCIATICA: ICD-10-CM

## 2021-06-25 DIAGNOSIS — Z79.4 TYPE 2 DIABETES MELLITUS WITH HYPERGLYCEMIA, WITH LONG-TERM CURRENT USE OF INSULIN (HCC): ICD-10-CM

## 2021-06-25 DIAGNOSIS — R53.83 FATIGUE, UNSPECIFIED TYPE: ICD-10-CM

## 2021-06-25 DIAGNOSIS — E55.9 VITAMIN D DEFICIENCY: ICD-10-CM

## 2021-06-25 DIAGNOSIS — M81.0 OSTEOPOROSIS WITHOUT CURRENT PATHOLOGICAL FRACTURE, UNSPECIFIED OSTEOPOROSIS TYPE: ICD-10-CM

## 2021-06-25 DIAGNOSIS — E66.9 CLASS 1 OBESITY WITH SERIOUS COMORBIDITY AND BODY MASS INDEX (BMI) OF 33.0 TO 33.9 IN ADULT, UNSPECIFIED OBESITY TYPE: ICD-10-CM

## 2021-06-25 DIAGNOSIS — I10 BENIGN ESSENTIAL HYPERTENSION: ICD-10-CM

## 2021-06-25 DIAGNOSIS — Z00.00 MEDICARE ANNUAL WELLNESS VISIT, INITIAL: Primary | ICD-10-CM

## 2021-06-25 PROCEDURE — 96160 PT-FOCUSED HLTH RISK ASSMT: CPT | Performed by: NURSE PRACTITIONER

## 2021-06-25 PROCEDURE — 1125F AMNT PAIN NOTED PAIN PRSNT: CPT | Performed by: NURSE PRACTITIONER

## 2021-06-25 PROCEDURE — 1170F FXNL STATUS ASSESSED: CPT | Performed by: NURSE PRACTITIONER

## 2021-06-25 PROCEDURE — 1160F RVW MEDS BY RX/DR IN RCRD: CPT | Performed by: NURSE PRACTITIONER

## 2021-06-25 PROCEDURE — 99397 PER PM REEVAL EST PAT 65+ YR: CPT | Performed by: NURSE PRACTITIONER

## 2021-06-25 PROCEDURE — G0438 PPPS, INITIAL VISIT: HCPCS | Performed by: NURSE PRACTITIONER

## 2021-06-25 RX ORDER — IBUPROFEN 800 MG/1
800 TABLET ORAL EVERY 8 HOURS PRN
Qty: 30 TABLET | Refills: 5 | Status: SHIPPED | OUTPATIENT
Start: 2021-06-25 | End: 2021-09-13 | Stop reason: SDUPTHER

## 2021-06-25 RX ORDER — INSULIN ASPART 100 [IU]/ML
INJECTION, SOLUTION INTRAVENOUS; SUBCUTANEOUS
Qty: 15 ML | Refills: 1 | Status: SHIPPED | OUTPATIENT
Start: 2021-06-25 | End: 2021-06-28 | Stop reason: SDUPTHER

## 2021-06-25 NOTE — PROGRESS NOTES
The ABCs of the Annual Wellness Visit  Initial Medicare Wellness Visit    Chief Complaint   Patient presents with   • Medicare Wellness-Initial Visit       Subjective   History of Present Illness:  Mercy Clemens is a 66 y.o. female who presents for an Initial Medicare Wellness Visit.    HEALTH RISK ASSESSMENT    Recent Hospitalizations:  No hospitalization(s) within the last year.    Current Medical Providers:  Patient Care Team:  Carolina Soto APRN as PCP - General (Family Medicine)    Smoking Status:  Social History     Tobacco Use   Smoking Status Never Smoker   Smokeless Tobacco Never Used       Alcohol Consumption:  Social History     Substance and Sexual Activity   Alcohol Use No       Depression Screen:   PHQ-2/PHQ-9 Depression Screening 6/25/2021   Little interest or pleasure in doing things 0   Feeling down, depressed, or hopeless 0   Total Score 0       Fall Risk Screen:  ANIYAHADI Fall Risk Assessment was completed, and patient is at LOW risk for falls.Assessment completed on:6/25/2021    Health Habits and Functional and Cognitive Screening:  Functional & Cognitive Status 6/25/2021   Do you have difficulty preparing food and eating? No   Do you have difficulty bathing yourself, getting dressed or grooming yourself? No   Do you have difficulty using the toilet? No   Do you have difficulty moving around from place to place? Yes   Do you have trouble with steps or getting out of a bed or a chair? Yes   Current Diet Diabetic Diet   Dental Exam Up to date   Eye Exam Up to date   Exercise (times per week) 0 times per week   Current Exercises Include No Regular Exercise   Do you need help using the phone?  No   Are you deaf or do you have serious difficulty hearing?  No   Do you need help with transportation? No   Do you need help shopping? No   Do you need help preparing meals?  No   Do you need help with housework?  No   Do you need help with laundry? No   Do you need help taking your medications? No   Do  you need help managing money? No   Do you ever drive or ride in a car without wearing a seat belt? No   Have you felt unusual stress, anger or loneliness in the last month? No   Who do you live with? Child   If you need help, do you have trouble finding someone available to you? No   Have you been bothered in the last four weeks by sexual problems? No   Do you have difficulty concentrating, remembering or making decisions? Yes         Does the patient have evidence of cognitive impairment? No    Asprin use counseling:Taking ASA appropriately as indicated    Age-appropriate Screening Schedule:  Refer to the list below for future screening recommendations based on patient's age, sex and/or medical conditions. Orders for these recommended tests are listed in the plan section. The patient has been provided with a written plan.    Health Maintenance   Topic Date Due   • ZOSTER VACCINE (1 of 2) Never done   • URINE MICROALBUMIN  01/18/2020   • DIABETIC FOOT EXAM  06/05/2020   • HEMOGLOBIN A1C  03/21/2021   • INFLUENZA VACCINE  08/01/2021   • LIPID PANEL  09/21/2021   • MAMMOGRAM  12/19/2021   • DIABETIC EYE EXAM  03/01/2022   • DXA SCAN  06/25/2022   • TDAP/TD VACCINES (2 - Td or Tdap) 09/21/2030   • PAP SMEAR  Discontinued          The following portions of the patient's history were reviewed and updated as appropriate: allergies, current medications, past family history, past medical history, past social history, past surgical history and problem list.    Outpatient Medications Prior to Visit   Medication Sig Dispense Refill   • alendronate (FOSAMAX) 70 MG tablet      • aspirin 81 MG EC tablet Take 81 mg by mouth Daily.     • Blood Glucose Monitoring Suppl w/Device kit Check twice daily 1 each 0   • cyclobenzaprine (FLEXERIL) 5 MG tablet TAKE ONE TABLET BY MOUTH TWICE A DAY AS NEEDED FOR MUSCLE SPASMS 30 tablet 5   • Empagliflozin (Jardiance) 10 MG tablet Take 10 mg by mouth Daily. 90 tablet 3   • glucose blood  (Accu-Chek Dinora Plus) test strip Use tid E11.9 DMII 300 each 3   • glucose blood test strip Test three times daily 200 each 3   • Insulin Degludec (Tresiba FlexTouch) 200 UNIT/ML solution pen-injector pen injection Inject 60 Units under the skin into the appropriate area as directed Every Night. 27 pen 3   • Insulin Pen Needle (B-D UF III MINI PEN NEEDLES) 31G X 5 MM misc USE ONE PEN NEEDLE TO GIVE INSULIN SHOTS FOUR TIMES A  each 11   • Lancets misc Check three times daily 200 each 2   • losartan-hydrochlorothiazide (HYZAAR) 100-25 MG per tablet Take 1 tablet by mouth Daily. 90 tablet 3   • meloxicam (MOBIC) 7.5 MG tablet Take 1 tablet by mouth Daily. 30 tablet 5   • metoprolol succinate XL (TOPROL-XL) 25 MG 24 hr tablet Take 1 tablet by mouth Daily. 90 tablet 3   • pregabalin (LYRICA) 150 MG capsule TAKE 1 CAPSULE BY MOUTH TWICE DAILY 60 capsule 0   • fluticasone (Flonase) 50 MCG/ACT nasal spray 2 sprays into the nostril(s) as directed by provider Daily. 15.8 mL 1   • insulin aspart (NovoLOG FlexPen) 100 UNIT/ML solution pen-injector sc pen Per BS protocol 15 mL 1   • glucose blood test strip Use tid. E11.9 300 each 12   • ipratropium (Atrovent) 0.03 % nasal spray 2 sprays into the nostril(s) as directed by provider Every 12 (Twelve) Hours. 30 mL 2   • loratadine (Claritin) 10 MG tablet Take 1 tablet by mouth Daily. 30 tablet 1   • Omega-3-Acid Eth Est, Dietary, 1 g capsule Take  by mouth.     • prazosin (MINIPRESS) 1 MG capsule TAKE ONE CAPSULE BY MOUTH ONCE NIGHTLY 30 capsule 0     No facility-administered medications prior to visit.       Patient Active Problem List   Diagnosis   • Hyperlipidemia   • Diverticulosis of large intestine   • Benign essential hypertension   • Eosinophilic leukocytosis   • Obstructive sleep apnea syndrome   • Uncontrolled type 2 diabetes mellitus without complication, with long-term current use of insulin   • Vitamin D deficiency   • Fibromyalgia   • Premature surgical  "menopause   • Diabetic neuropathy, painful (CMS/HCC)   • Primary insomnia   • Primary osteoarthritis involving multiple joints       Advanced Care Planning:  ACP discussion was held with the patient during this visit. Patient does not have an advance directive, declines further assistance.    Review of Systems   Constitutional: Positive for fatigue.   HENT: Negative.    Eyes: Negative.    Respiratory: Negative.    Cardiovascular: Negative.    Gastrointestinal: Negative.    Endocrine: Negative.    Genitourinary: Negative.    Musculoskeletal: Positive for arthralgias, back pain and myalgias.   Skin: Negative.    Allergic/Immunologic: Positive for environmental allergies.   Neurological: Negative.    Hematological: Negative.    Psychiatric/Behavioral: Negative.        Compared to one year ago, the patient feels her physical health is the same.  Compared to one year ago, the patient feels her mental health is the same.    Reviewed chart for potential of high risk medication in the elderly: yes  Reviewed chart for potential of harmful drug interactions in the elderly:yes    Objective         Vitals:    06/25/21 1021   BP: 151/63   Pulse: 73   Resp: 15   Temp: 97.8 °F (36.6 °C)   SpO2: 99%   Weight: 83 kg (183 lb)   Height: 157.5 cm (62\")   PainSc: 0-No pain     Lab Results   Component Value Date    HGBA1C 6.50 (H) 09/21/2020    HGBA1C 6.4 06/19/2020    HGBA1C 6.6 10/09/2019       Body mass index is 33.47 kg/m².  Discussed the patient's BMI with her. The BMI is above average; BMI management plan is completed.    Physical Exam  Vitals and nursing note reviewed.   Constitutional:       General: She is not in acute distress.     Appearance: Normal appearance. She is well-developed. She is obese. She is not ill-appearing.   HENT:      Head: Normocephalic and atraumatic.      Right Ear: Hearing, ear canal and external ear normal. Tympanic membrane is erythematous and bulging.      Left Ear: Hearing, ear canal and external ear " normal. Tympanic membrane is erythematous and bulging.      Nose: Mucosal edema present.      Right Sinus: No maxillary sinus tenderness or frontal sinus tenderness.      Left Sinus: No maxillary sinus tenderness or frontal sinus tenderness.      Mouth/Throat:      Mouth: Mucous membranes are dry.      Dentition: Normal dentition.      Pharynx: Posterior oropharyngeal erythema present.   Eyes:      Conjunctiva/sclera: Conjunctivae normal.      Pupils: Pupils are equal, round, and reactive to light.   Neck:      Thyroid: No thyroid mass or thyromegaly.      Vascular: No carotid bruit or JVD.   Cardiovascular:      Rate and Rhythm: Normal rate and regular rhythm.      Pulses: Normal pulses.      Heart sounds: Normal heart sounds, S1 normal and S2 normal. No murmur heard.     Pulmonary:      Effort: Pulmonary effort is normal. No respiratory distress.      Breath sounds: Normal breath sounds.   Abdominal:      General: Bowel sounds are normal. There is no distension or abdominal bruit.      Palpations: Abdomen is soft. There is no mass.      Tenderness: There is no abdominal tenderness.   Genitourinary:     Comments: Deferred  Musculoskeletal:         General: Tenderness and deformity present. Normal range of motion.      Cervical back: Normal range of motion and neck supple.      Comments: Ganglion cyst of left middle finger in palm and on left finger nontender    Lymphadenopathy:      Head:      Right side of head: No submental, submandibular or tonsillar adenopathy.      Left side of head: No submental, submandibular or tonsillar adenopathy.      Cervical: No cervical adenopathy.   Skin:     General: Skin is warm and dry.      Capillary Refill: Capillary refill takes less than 2 seconds.      Findings: No rash.      Nails: There is no clubbing.   Neurological:      Mental Status: She is alert and oriented to person, place, and time. Mental status is at baseline.      Cranial Nerves: No cranial nerve deficit.       Sensory: Sensory deficit present.      Gait: Gait normal.   Psychiatric:         Behavior: Behavior normal.         Thought Content: Thought content normal.         Judgment: Judgment normal.               Assessment/Plan   Medicare Risks and Personalized Health Plan  CMS Preventative Services Quick Reference  Advance Directive Discussion  Breast Cancer/Mammogram Screening  Cardiovascular risk  Chronic Pain   Colon Cancer Screening  Dementia/Memory   Depression/Dysphoria  Diabetic Lab Screening   Fall Risk  Immunizations Discussed/Encouraged (specific immunizations; Shingrix )  Inadequate Social Support, Isolation, Loneliness, Lack of Transportation, Financial Difficulties, or Caregiver Stress   Inactivity/Sedentary  Obesity/Overweight   Osteoporosis Risk    The above risks/problems have been discussed with the patient.  Pertinent information has been shared with the patient in the After Visit Summary.  Follow up plans and orders are seen below in the Assessment/Plan Section.    Diagnoses and all orders for this visit:    1. Medicare annual wellness visit, initial (Primary)  -     Comprehensive metabolic panel  -     CBC w AUTO Differential  -     Lipid panel    2. Type 2 diabetes mellitus with hyperglycemia, with long-term current use of insulin (CMS/[facility])  -     insulin aspart (NovoLOG FlexPen) 100 UNIT/ML solution pen-injector sc pen; Per  protocol  Dispense: 15 mL; Refill: 1  -     Hemoglobin A1c    3. Vitamin D deficiency  Stable   4. Mixed hyperlipidemia  -     Lipid panel    5. Fatigue, unspecified type  -     TSH  -     T4, free  -     Vitamin B12  Discussed sleep hygiene   6. Osteoporosis without current pathological fracture, unspecified osteoporosis type  -     ibuprofen (ADVIL,MOTRIN) 800 MG tablet; Take 1 tablet by mouth Every 8 (Eight) Hours As Needed for Moderate Pain .  Dispense: 30 tablet; Refill: 5  Discussed fall prevention and safety  7. Benign essential hypertension  Initiate lifestyle  modifications.   DASH Diet and exercise.   Compliance with medication regimen and discussed ways to prevent of long-term complications from high blood pressure.  Discussed when to seek medical attention.  Encouraged patient to take blood pressure daily and keep a log.    Contact office if BP elevated   8. Class 1 obesity with serious comorbidity and body mass index (BMI) of 33.0 to 33.9 in adult, unspecified obesity type  Patient's (Body mass index is 33.47 kg/m².) indicates that they are obese (BMI >30) with obesity-related health conditions that include hypertension, diabetes mellitus, dyslipidemias, GERD and osteoarthritis . Obesity is improving with lifestyle modifications. BMI is is above average; BMI management plan is completed. We discussed low calorie, low carb based diet program, portion control, increasing exercise, joining a fitness center or start home based exercise program and management of depression/anxiety/stress to control compensatory eating.     9. Chronic bilateral low back pain without sciatica  -     ibuprofen (ADVIL,MOTRIN) 800 MG tablet; Take 1 tablet by mouth Every 8 (Eight) Hours As Needed for Moderate Pain .  Dispense: 30 tablet; Refill: 5      Follow Up:  Return if symptoms worsen or fail to improve.     An After Visit Summary and PPPS were given to the patient.

## 2021-06-25 NOTE — PATIENT INSTRUCTIONS
Health Maintenance After Age 65  After age 65, you are at a higher risk for certain long-term diseases and infections as well as injuries from falls. Falls are a major cause of broken bones and head injuries in people who are older than age 65. Getting regular preventive care can help to keep you healthy and well. Preventive care includes getting regular testing and making lifestyle changes as recommended by your health care provider. Talk with your health care provider about:  · Which screenings and tests you should have. A screening is a test that checks for a disease when you have no symptoms.  · A diet and exercise plan that is right for you.  What should I know about screenings and tests to prevent falls?  Screening and testing are the best ways to find a health problem early. Early diagnosis and treatment give you the best chance of managing medical conditions that are common after age 65. Certain conditions and lifestyle choices may make you more likely to have a fall. Your health care provider may recommend:  · Regular vision checks. Poor vision and conditions such as cataracts can make you more likely to have a fall. If you wear glasses, make sure to get your prescription updated if your vision changes.  · Medicine review. Work with your health care provider to regularly review all of the medicines you are taking, including over-the-counter medicines. Ask your health care provider about any side effects that may make you more likely to have a fall. Tell your health care provider if any medicines that you take make you feel dizzy or sleepy.  · Osteoporosis screening. Osteoporosis is a condition that causes the bones to get weaker. This can make the bones weak and cause them to break more easily.  · Blood pressure screening. Blood pressure changes and medicines to control blood pressure can make you feel dizzy.  · Strength and balance checks. Your health care provider may recommend certain tests to check your  strength and balance while standing, walking, or changing positions.  · Foot health exam. Foot pain and numbness, as well as not wearing proper footwear, can make you more likely to have a fall.  · Depression screening. You may be more likely to have a fall if you have a fear of falling, feel emotionally low, or feel unable to do activities that you used to do.  · Alcohol use screening. Using too much alcohol can affect your balance and may make you more likely to have a fall.  What actions can I take to lower my risk of falls?  General instructions  · Talk with your health care provider about your risks for falling. Tell your health care provider if:  ? You fall. Be sure to tell your health care provider about all falls, even ones that seem minor.  ? You feel dizzy, sleepy, or off-balance.  · Take over-the-counter and prescription medicines only as told by your health care provider. These include any supplements.  · Eat a healthy diet and maintain a healthy weight. A healthy diet includes low-fat dairy products, low-fat (lean) meats, and fiber from whole grains, beans, and lots of fruits and vegetables.  Home safety  · Remove any tripping hazards, such as rugs, cords, and clutter.  · Install safety equipment such as grab bars in bathrooms and safety rails on stairs.  · Keep rooms and walkways well-lit.  Activity    · Follow a regular exercise program to stay fit. This will help you maintain your balance. Ask your health care provider what types of exercise are appropriate for you.  · If you need a cane or walker, use it as recommended by your health care provider.  · Wear supportive shoes that have nonskid soles.  Lifestyle  · Do not drink alcohol if your health care provider tells you not to drink.  · If you drink alcohol, limit how much you have:  ? 0-1 drink a day for women.  ? 0-2 drinks a day for men.  · Be aware of how much alcohol is in your drink. In the [place] one drink equals one typical bottle of beer (12  oz), one-half glass of wine (5 oz), or one shot of hard liquor (1½ oz).  · Do not use any products that contain nicotine or tobacco, such as cigarettes and e-cigarettes. If you need help quitting, ask your health care provider.  Summary  · Having a healthy lifestyle and getting preventive care can help to protect your health and wellness after age 65.  · Screening and testing are the best way to find a health problem early and help you avoid having a fall. Early diagnosis and treatment give you the best chance for managing medical conditions that are more common for people who are older than age 65.  · Falls are a major cause of broken bones and head injuries in people who are older than age 65. Take precautions to prevent a fall at home.  · Work with your health care provider to learn what changes you can make to improve your health and wellness and to prevent falls.  This information is not intended to replace advice given to you by your health care provider. Make sure you discuss any questions you have with your health care provider.  Document Revised: 04/09/2020 Document Reviewed: 10/31/2018  Elsevier Patient Education © 2021 [employer]

## 2021-06-28 DIAGNOSIS — Z79.4 UNCONTROLLED TYPE 2 DIABETES MELLITUS WITH HYPERGLYCEMIA, WITH LONG-TERM CURRENT USE OF INSULIN (HCC): ICD-10-CM

## 2021-06-28 DIAGNOSIS — Z79.4 TYPE 2 DIABETES MELLITUS WITH HYPERGLYCEMIA, WITH LONG-TERM CURRENT USE OF INSULIN (HCC): ICD-10-CM

## 2021-06-28 DIAGNOSIS — E11.65 TYPE 2 DIABETES MELLITUS WITH HYPERGLYCEMIA, WITH LONG-TERM CURRENT USE OF INSULIN (HCC): ICD-10-CM

## 2021-06-28 DIAGNOSIS — E11.65 UNCONTROLLED TYPE 2 DIABETES MELLITUS WITH HYPERGLYCEMIA, WITH LONG-TERM CURRENT USE OF INSULIN (HCC): ICD-10-CM

## 2021-06-29 ENCOUNTER — TELEPHONE (OUTPATIENT)
Dept: INTERNAL MEDICINE | Facility: CLINIC | Age: 66
End: 2021-06-29

## 2021-06-29 ENCOUNTER — E-VISIT (OUTPATIENT)
Dept: FAMILY MEDICINE CLINIC | Facility: TELEHEALTH | Age: 66
End: 2021-06-29

## 2021-06-29 DIAGNOSIS — R39.89 SUSPECTED UTI: Primary | ICD-10-CM

## 2021-06-29 PROCEDURE — 99422 OL DIG E/M SVC 11-20 MIN: CPT | Performed by: NURSE PRACTITIONER

## 2021-06-29 RX ORDER — PHENAZOPYRIDINE HYDROCHLORIDE 200 MG/1
200 TABLET, FILM COATED ORAL 3 TIMES DAILY PRN
Qty: 6 TABLET | Refills: 0 | Status: SHIPPED | OUTPATIENT
Start: 2021-06-29 | End: 2021-07-01

## 2021-06-29 RX ORDER — INSULIN ASPART 100 [IU]/ML
INJECTION, SOLUTION INTRAVENOUS; SUBCUTANEOUS
Qty: 15 ML | Refills: 1 | Status: SHIPPED | OUTPATIENT
Start: 2021-06-29 | End: 2021-07-23 | Stop reason: SDUPTHER

## 2021-06-29 RX ORDER — SULFAMETHOXAZOLE AND TRIMETHOPRIM 800; 160 MG/1; MG/1
1 TABLET ORAL 2 TIMES DAILY
Qty: 10 TABLET | Refills: 0 | Status: SHIPPED | OUTPATIENT
Start: 2021-06-29 | End: 2021-07-04

## 2021-06-29 RX ORDER — FLURBIPROFEN SODIUM 0.3 MG/ML
SOLUTION/ DROPS OPHTHALMIC
Qty: 200 EACH | Refills: 11 | Status: SHIPPED | OUTPATIENT
Start: 2021-06-29 | End: 2021-07-28 | Stop reason: SDUPTHER

## 2021-06-29 NOTE — TELEPHONE ENCOUNTER
Pharmacy Name: [facility] [address] [place] [address] [address] AT [facility] - [phone] [facility] [phone]      Pharmacy representative name: OMER    Pharmacy representative phone number: [phone]    What medication are you calling in regards to: NOVOLOG PEN    What question does the pharmacy have: NEEDS A MAX DOES    Who is the provider that prescribed the medication:JASWINDER

## 2021-06-29 NOTE — PATIENT INSTRUCTIONS
Take medicine as prescribed.  Wipe front to back, stay hydrated with water to flush the kidneys, avoid caffeinated beverages.  If symptoms worsen or do not improve follow up with your PCP or visit your nearest Urgent Care Center or ER.      Urinary Tract Infection, Adult  A urinary tract infection (UTI) is an infection of any part of the urinary tract. The urinary tract includes:  · The kidneys.  · The ureters.  · The bladder.  · The urethra.  These organs make, store, and get rid of pee (urine) in the body.  What are the causes?  This is caused by germs (bacteria) in your genital area. These germs grow and cause swelling (inflammation) of your urinary tract.  What increases the risk?  You are more likely to develop this condition if:  · You have a small, thin tube (catheter) to drain pee.  · You cannot control when you pee or poop (incontinence).  · You are female, and:  ? You use these methods to prevent pregnancy:  § A medicine that kills sperm (spermicide).  § A device that blocks sperm (diaphragm).  ? You have low levels of a female hormone (estrogen).  ? You are pregnant.  · You have genes that add to your risk.  · You are sexually active.  · You take antibiotic medicines.  · You have trouble peeing because of:  ? A prostate that is bigger than normal, if you are male.  ? A blockage in the part of your body that drains pee from the bladder (urethra).  ? A kidney stone.  ? A nerve condition that affects your bladder (neurogenic bladder).  ? Not getting enough to drink.  ? Not peeing often enough.  · You have other conditions, such as:  ? Diabetes.  ? A weak disease-fighting system (immune system).  ? Sickle cell disease.  ? Gout.  ? Injury of the spine.  What are the signs or symptoms?  Symptoms of this condition include:  · Needing to pee right away (urgently).  · Peeing often.  · Peeing small amounts often.  · Pain or burning when peeing.  · Blood in the pee.  · Pee that smells bad or not like  normal.  · Trouble peeing.  · Pee that is cloudy.  · Fluid coming from the vagina, if you are female.  · Pain in the belly or lower back.  Other symptoms include:  · Throwing up (vomiting).  · No urge to eat.  · Feeling mixed up (confused).  · Being tired and grouchy (irritable).  · A fever.  · Watery poop (diarrhea).  How is this treated?  This condition may be treated with:  · Antibiotic medicine.  · Other medicines.  · Drinking enough water.  Follow these instructions at home:    Medicines  · Take over-the-counter and prescription medicines only as told by your doctor.  · If you were prescribed an antibiotic medicine, take it as told by your doctor. Do not stop taking it even if you start to feel better.  General instructions  · Make sure you:  ? Pee until your bladder is empty.  ? Do not hold pee for a long time.  ? Empty your bladder after sex.  ? Wipe from front to back after pooping if you are a female. Use each tissue one time when you wipe.  · Drink enough fluid to keep your pee pale yellow.  · Keep all follow-up visits as told by your doctor. This is important.  Contact a doctor if:  · You do not get better after 1-2 days.  · Your symptoms go away and then come back.  Get help right away if:  · You have very bad back pain.  · You have very bad pain in your lower belly.  · You have a fever.  · You are sick to your stomach (nauseous).  · You are throwing up.  Summary  · A urinary tract infection (UTI) is an infection of any part of the urinary tract.  · This condition is caused by germs in your genital area.  · There are many risk factors for a UTI. These include having a small, thin tube to drain pee and not being able to control when you pee or poop.  · Treatment includes antibiotic medicines for germs.  · Drink enough fluid to keep your pee pale yellow.  This information is not intended to replace advice given to you by your health care provider. Make sure you discuss any questions you have with your  health care provider.  Document Revised: 12/05/2019 Document Reviewed: 06/27/2019  Elsevier Patient Education © 2021 [employer]

## 2021-06-29 NOTE — PROGRESS NOTES
Mercy Clemens    1955  [id number]    I have reviewed the e-Visit questionnaire and patient's answers, my assessment and plan are as follows:    HPI- Mercy Clemens is a 66 y.o. female with complaints of burning pain with urination, dark yellow urine, urine odor, bladder pain x 1-4 days. Denies fever, genital sores, kidney problems, antibiotic use in the past 3 months. She has had symptoms in the past more than once.     Review of Systems    Review of Systems - Negative unless mentioned in HPI    Diagnoses and all orders for this visit:    1. Suspected UTI (Primary)  -     sulfamethoxazole-trimethoprim (Bactrim DS) 800-160 MG per tablet; Take 1 tablet by mouth 2 (Two) Times a Day for 5 days.  Dispense: 10 tablet; Refill: 0  -     phenazopyridine (Pyridium) 200 MG tablet; Take 1 tablet by mouth 3 (Three) Times a Day As Needed for Bladder Spasms for up to 2 days.  Dispense: 6 tablet; Refill: 0      Take medicine as prescribed.  Wipe front to back, stay hydrated with water to flush the kidneys, avoid caffeinated beverages.  If symptoms worsen or do not improve follow up with your PCP or visit your nearest Urgent Care Center or ER.    Any medications prescribed have been sent electronically to   [id number] [place] - [address] AT [facility]. - [phone] PH - [phone] FX  [address]  [facility] [address]  Phone: [phone] Fax: [phone]    [id number] - [place], [place] - [address] AT [facility] - [phone] PH - [phone] FX  [address] [place] [address]  Phone: [phone] Fax: [phone]    I spent 11 min reviewing this chart.     LARA Man  06/29/21  17:11 EDT

## 2021-07-06 LAB
ALBUMIN SERPL-MCNC: 4.8 G/DL (ref 3.5–5.2)
ALBUMIN/GLOB SERPL: 2.3 G/DL
ALP SERPL-CCNC: 46 U/L (ref 39–117)
ALT SERPL-CCNC: 19 U/L (ref 1–33)
AST SERPL-CCNC: 17 U/L (ref 1–32)
BASOPHILS # BLD AUTO: 0.05 10*3/MM3 (ref 0–0.2)
BASOPHILS NFR BLD AUTO: 0.7 % (ref 0–1.5)
BILIRUB SERPL-MCNC: 0.7 MG/DL (ref 0–1.2)
BUN SERPL-MCNC: 16 MG/DL (ref 8–23)
BUN/CREAT SERPL: 19.5 (ref 7–25)
CALCIUM SERPL-MCNC: 9.9 MG/DL (ref 8.6–10.5)
CHLORIDE SERPL-SCNC: 103 MMOL/L (ref 98–107)
CHOLEST SERPL-MCNC: 211 MG/DL (ref 0–200)
CO2 SERPL-SCNC: 24.6 MMOL/L (ref 22–29)
CREAT SERPL-MCNC: 0.82 MG/DL (ref 0.57–1)
EOSINOPHIL # BLD AUTO: 0.2 10*3/MM3 (ref 0–0.4)
EOSINOPHIL NFR BLD AUTO: 2.6 % (ref 0.3–6.2)
ERYTHROCYTE [DISTWIDTH] IN BLOOD BY AUTOMATED COUNT: 12.6 % (ref 12.3–15.4)
GLOBULIN SER CALC-MCNC: 2.1 GM/DL
GLUCOSE SERPL-MCNC: 152 MG/DL (ref 65–99)
HBA1C MFR BLD: 6.5 % (ref 4.8–5.6)
HCT VFR BLD AUTO: 53.4 % (ref 34–46.6)
HDLC SERPL-MCNC: 48 MG/DL (ref 40–60)
HGB BLD-MCNC: 18.9 G/DL (ref 12–15.9)
IMM GRANULOCYTES # BLD AUTO: 0.03 10*3/MM3 (ref 0–0.05)
IMM GRANULOCYTES NFR BLD AUTO: 0.4 % (ref 0–0.5)
LDLC SERPL CALC-MCNC: 139 MG/DL (ref 0–100)
LYMPHOCYTES # BLD AUTO: 2.18 10*3/MM3 (ref 0.7–3.1)
LYMPHOCYTES NFR BLD AUTO: 28.4 % (ref 19.6–45.3)
MCH RBC QN AUTO: 32.9 PG (ref 26.6–33)
MCHC RBC AUTO-ENTMCNC: 35.4 G/DL (ref 31.5–35.7)
MCV RBC AUTO: 92.9 FL (ref 79–97)
MONOCYTES # BLD AUTO: 0.66 10*3/MM3 (ref 0.1–0.9)
MONOCYTES NFR BLD AUTO: 8.6 % (ref 5–12)
NEUTROPHILS # BLD AUTO: 4.56 10*3/MM3 (ref 1.7–7)
NEUTROPHILS NFR BLD AUTO: 59.3 % (ref 42.7–76)
NRBC BLD AUTO-RTO: 0 /100 WBC (ref 0–0.2)
PLATELET # BLD AUTO: 248 10*3/MM3 (ref 140–450)
POTASSIUM SERPL-SCNC: 4.9 MMOL/L (ref 3.5–5.2)
PROT SERPL-MCNC: 6.9 G/DL (ref 6–8.5)
RBC # BLD AUTO: 5.75 10*6/MM3 (ref 3.77–5.28)
SODIUM SERPL-SCNC: 140 MMOL/L (ref 136–145)
T4 FREE SERPL-MCNC: 1.29 NG/DL (ref 0.93–1.7)
TRIGL SERPL-MCNC: 132 MG/DL (ref 0–150)
TSH SERPL DL<=0.005 MIU/L-ACNC: 1.19 UIU/ML (ref 0.27–4.2)
VIT B12 SERPL-MCNC: 1507 PG/ML (ref 211–946)
VLDLC SERPL CALC-MCNC: 24 MG/DL (ref 5–40)
WBC # BLD AUTO: 7.68 10*3/MM3 (ref 3.4–10.8)

## 2021-07-23 ENCOUNTER — TELEPHONE (OUTPATIENT)
Dept: INTERNAL MEDICINE | Facility: CLINIC | Age: 66
End: 2021-07-23

## 2021-07-23 DIAGNOSIS — E13.69 OTHER SPECIFIED DIABETES MELLITUS WITH OTHER SPECIFIED COMPLICATION, UNSPECIFIED WHETHER LONG TERM INSULIN USE (HCC): ICD-10-CM

## 2021-07-23 DIAGNOSIS — E11.65 UNCONTROLLED TYPE 2 DIABETES MELLITUS WITH HYPERGLYCEMIA, WITH LONG-TERM CURRENT USE OF INSULIN (HCC): ICD-10-CM

## 2021-07-23 DIAGNOSIS — Z79.4 UNCONTROLLED TYPE 2 DIABETES MELLITUS WITH HYPERGLYCEMIA, WITH LONG-TERM CURRENT USE OF INSULIN (HCC): ICD-10-CM

## 2021-07-23 DIAGNOSIS — Z79.4 TYPE 2 DIABETES MELLITUS WITH HYPERGLYCEMIA, WITH LONG-TERM CURRENT USE OF INSULIN (HCC): ICD-10-CM

## 2021-07-23 DIAGNOSIS — E11.65 TYPE 2 DIABETES MELLITUS WITH HYPERGLYCEMIA, WITH LONG-TERM CURRENT USE OF INSULIN (HCC): ICD-10-CM

## 2021-07-23 RX ORDER — INSULIN DEGLUDEC 200 U/ML
60 INJECTION, SOLUTION SUBCUTANEOUS NIGHTLY
Qty: 27 PEN | Refills: 3 | Status: SHIPPED | OUTPATIENT
Start: 2021-07-23

## 2021-07-23 RX ORDER — INSULIN ASPART 100 [IU]/ML
INJECTION, SOLUTION INTRAVENOUS; SUBCUTANEOUS
Qty: 15 ML | Refills: 1 | Status: SHIPPED | OUTPATIENT
Start: 2021-07-23 | End: 2021-09-01

## 2021-07-23 NOTE — TELEPHONE ENCOUNTER
[employer]RET WITH [employer] IS CALLING TO GET CLARIFICATION FOR THE PRIOR AUTHORIZATION FORM FOR Insulin Degludec (Tresiba FlexTouch) 200 UNIT/ML solution pen-injector pen injection. PLEASE RETURN CALL [phone] REFERENCE [id number]

## 2021-07-23 NOTE — TELEPHONE ENCOUNTER
Caller: BRET [employer]    Relationship: Other    Best call back number: [phone] - REFERENCE NUMBER [id number]  What medications are you currently taking:   Current Outpatient Medications on File Prior to Visit   Medication Sig Dispense Refill   • alendronate (FOSAMAX) 70 MG tablet      • aspirin 81 MG EC tablet Take 81 mg by mouth Daily.     • Blood Glucose Monitoring Suppl w/Device kit Check twice daily 1 each 0   • cyclobenzaprine (FLEXERIL) 5 MG tablet TAKE ONE TABLET BY MOUTH TWICE A DAY AS NEEDED FOR MUSCLE SPASMS 30 tablet 5   • Empagliflozin (Jardiance) 10 MG tablet Take 10 mg by mouth Daily. 90 tablet 3   • glucose blood (Accu-Chek Dinora Plus) test strip Use tid E11.9 DMII 300 each 3   • glucose blood test strip Test three times daily 200 each 3   • ibuprofen (ADVIL,MOTRIN) 800 MG tablet Take 1 tablet by mouth Every 8 (Eight) Hours As Needed for Moderate Pain . 30 tablet 5   • insulin aspart (NovoLOG FlexPen) 100 UNIT/ML solution pen-injector sc pen Per BS protocol 15 mL 1   • Insulin Degludec (Tresiba FlexTouch) 200 UNIT/ML solution pen-injector pen injection Inject 60 Units under the skin into the appropriate area as directed Every Night. 27 pen 3   • Insulin Pen Needle (B-D UF III MINI PEN NEEDLES) 31G X 5 MM misc USE ONE PEN NEEDLE TO GIVE INSULIN SHOTS FOUR TIMES A  each 11   • Lancets misc Check three times daily 200 each 2   • losartan-hydrochlorothiazide (HYZAAR) 100-25 MG per tablet Take 1 tablet by mouth Daily. 90 tablet 3   • meloxicam (MOBIC) 7.5 MG tablet Take 1 tablet by mouth Daily. 30 tablet 5   • metoprolol succinate XL (TOPROL-XL) 25 MG 24 hr tablet Take 1 tablet by mouth Daily. 90 tablet 3   • pregabalin (LYRICA) 150 MG capsule TAKE 1 CAPSULE BY MOUTH TWICE DAILY 60 capsule 0     No current facility-administered medications on file prior to visit.        When did you start taking these medications: Which medication are you concerned about:  insulin aspart (NovoLOG FlexPen) 100  UNIT/ML solution pen-injector sc pen    Who prescribed you this medication: RIP AMBROSIO    What are your concerns: TIER EXCEPTION     How long have you been taking these medications: 06/2021    How long have you had these concerns: [employer]RET WITH [employer] IS REQUESTING A CALL BACK CONCERNING A TIER EXCEPTION FOR THE MEDICATION.     PLEASE CALL [phone] - REFERENCE NUMBER [id number]

## 2021-07-28 DIAGNOSIS — E11.65 UNCONTROLLED TYPE 2 DIABETES MELLITUS WITH HYPERGLYCEMIA, WITH LONG-TERM CURRENT USE OF INSULIN (HCC): ICD-10-CM

## 2021-07-28 DIAGNOSIS — Z79.4 UNCONTROLLED TYPE 2 DIABETES MELLITUS WITH HYPERGLYCEMIA, WITH LONG-TERM CURRENT USE OF INSULIN (HCC): ICD-10-CM

## 2021-07-28 DIAGNOSIS — M25.842 CYST OF JOINT OF LEFT HAND: Primary | ICD-10-CM

## 2021-07-28 RX ORDER — FLURBIPROFEN SODIUM 0.3 MG/ML
SOLUTION/ DROPS OPHTHALMIC
Qty: 200 EACH | Refills: 11 | Status: SHIPPED | OUTPATIENT
Start: 2021-07-28

## 2021-07-28 RX ORDER — ISOPROPYL ALCOHOL 0.75 G/1
SWAB TOPICAL
Qty: 300 EACH | Refills: 3 | Status: SHIPPED | OUTPATIENT
Start: 2021-07-28

## 2021-08-04 RX ORDER — LANCETS
EACH MISCELLANEOUS
Qty: 100 EACH | Refills: 12 | Status: SHIPPED | OUTPATIENT
Start: 2021-08-04 | End: 2022-08-04

## 2021-09-01 DIAGNOSIS — Z79.4 TYPE 2 DIABETES MELLITUS WITH HYPERGLYCEMIA, WITH LONG-TERM CURRENT USE OF INSULIN (HCC): ICD-10-CM

## 2021-09-01 DIAGNOSIS — E11.65 TYPE 2 DIABETES MELLITUS WITH HYPERGLYCEMIA, WITH LONG-TERM CURRENT USE OF INSULIN (HCC): ICD-10-CM

## 2021-09-01 RX ORDER — INSULIN ASPART 100 [IU]/ML
INJECTION, SOLUTION INTRAVENOUS; SUBCUTANEOUS
Qty: 30 ML | Refills: 5 | Status: SHIPPED | OUTPATIENT
Start: 2021-09-01 | End: 2023-03-30 | Stop reason: SDUPTHER

## 2021-09-13 DIAGNOSIS — G89.29 CHRONIC BILATERAL LOW BACK PAIN WITHOUT SCIATICA: ICD-10-CM

## 2021-09-13 DIAGNOSIS — M81.0 OSTEOPOROSIS WITHOUT CURRENT PATHOLOGICAL FRACTURE, UNSPECIFIED OSTEOPOROSIS TYPE: ICD-10-CM

## 2021-09-13 DIAGNOSIS — M79.7 FIBROMYALGIA: ICD-10-CM

## 2021-09-13 DIAGNOSIS — M54.50 CHRONIC BILATERAL LOW BACK PAIN WITHOUT SCIATICA: ICD-10-CM

## 2021-09-13 DIAGNOSIS — E11.40 DIABETIC NEUROPATHY, PAINFUL (HCC): ICD-10-CM

## 2021-09-13 RX ORDER — IBUPROFEN 800 MG/1
800 TABLET ORAL EVERY 8 HOURS PRN
Qty: 30 TABLET | Refills: 5 | Status: SHIPPED | OUTPATIENT
Start: 2021-09-13 | End: 2021-09-23 | Stop reason: SDUPTHER

## 2021-09-13 RX ORDER — PREGABALIN 150 MG/1
CAPSULE ORAL
Qty: 60 CAPSULE | Refills: 0 | Status: SHIPPED | OUTPATIENT
Start: 2021-09-13 | End: 2021-10-27 | Stop reason: SDUPTHER

## 2021-09-14 ENCOUNTER — OFFICE VISIT (OUTPATIENT)
Dept: ORTHOPEDIC SURGERY | Facility: CLINIC | Age: 66
End: 2021-09-14

## 2021-09-14 VITALS — HEIGHT: 62 IN | BODY MASS INDEX: 33.75 KG/M2 | WEIGHT: 183.4 LBS | TEMPERATURE: 96.9 F

## 2021-09-14 DIAGNOSIS — M17.10 ARTHRITIS OF KNEE: ICD-10-CM

## 2021-09-14 DIAGNOSIS — M25.562 ARTHRALGIA OF BOTH KNEES: Primary | ICD-10-CM

## 2021-09-14 DIAGNOSIS — M25.561 ARTHRALGIA OF BOTH KNEES: Primary | ICD-10-CM

## 2021-09-14 PROCEDURE — 99204 OFFICE O/P NEW MOD 45 MIN: CPT | Performed by: ORTHOPAEDIC SURGERY

## 2021-09-14 RX ORDER — MELOXICAM 15 MG/1
TABLET ORAL
COMMUNITY
Start: 2021-08-12 | End: 2021-10-28 | Stop reason: SDUPTHER

## 2021-09-14 NOTE — PROGRESS NOTES
Subjective   Patient ID: Mercy Clemens is a 66 y.o. female  Pain of the Right Knee and Pain of the Left Knee (Self-referral for bilateral knee pain x 2-3 years. No history of injury, no surgeries on knees in the past. )             History of Present Illness  66-year-old female with chronic bilateral knee pain some crunching sore sensation is no giving way locking or buckling or recent fall or injury.  Has been treated by Dr. Olmos with Lyrica and Mobic with slight improvement in her generalized polymyalgia but persistent right and left anterior knee pain with getting out of a chair initial walking still aggravates her.  Denies rest pain denies prior surgery injections significant swelling in the ankle or paresthesias.      Review of Systems   Constitutional: Negative for fever.   HENT: Negative for dental problem and voice change.    Eyes: Negative for visual disturbance.   Respiratory: Negative for shortness of breath.    Cardiovascular: Negative for chest pain.   Gastrointestinal: Negative for abdominal pain.   Genitourinary: Negative for dysuria.   Musculoskeletal: Positive for arthralgias. Negative for gait problem and joint swelling.   Skin: Negative for rash.   Neurological: Negative for speech difficulty.   Hematological: Does not bruise/bleed easily.   Psychiatric/Behavioral: Negative for confusion.       Past Medical History:   Diagnosis Date   • Abdominal bloating    • Abdominal pain    • Allergic 1957    Penicillin   • Arthritis     Knees hips   • BMI 34.0-34.9,adult    • Cataract     Mini cataracts   • Diabetes mellitus (CMS/[facility])    • Diarrhea    • Diverticulosis     Noted in colon test   • Encounter for long-term (current) use of medications    • Fatigue    • Fibromyalgia, primary 2011    Entire body   • Hyperlipidemia    • Hypertension    • Low back pain     Detiorated disk   • Marked eosinophilia    • Myalgia and myositis    • Nausea    • Obesity     Weight at 235 at one time   • Osteopenia     Noted  in test   • Pneumonia     Have had one occurrance of pneumonia   • Reaction to chronic stress    • RUQ abdominal pain    • Urinary tract infection     Uti have had many   • Viral gastroenteritis         Past Surgical History:   Procedure Laterality Date   • APPENDECTOMY      Taken out   • BILATERAL OOPHORECTOMY     • BLADDER SURGERY      Bladder tack : Mckay Marzetti procedure   • BREAST BIOPSY     • COLONOSCOPY      march 2015   • EYE SURGERY      To straighten pulled eyes   • HYSTERECTOMY         Family History   Problem Relation Age of Onset   • Arthritis Mother    • Hypertension Mother    • Stroke Mother         small stroke   • Hyperlipidemia Mother    • Cancer Father         lung cancer   • Lung cancer Father    • No Known Problems Sister    • Lymphoma Maternal Uncle    • Cancer Paternal Uncle    • Lung cancer Paternal Uncle    • No Known Problems Sister    • Cancer Maternal Uncle    • Lung cancer Maternal Uncle        Social History     Socioeconomic History   • Marital status:      Spouse name: Not on file   • Number of children: Not on file   • Years of education: Not on file   • Highest education level: Not on file   Tobacco Use   • Smoking status: Never Smoker   • Smokeless tobacco: Never Used   Substance and Sexual Activity   • Alcohol use: No   • Drug use: No   • Sexual activity: Yes     Partners: Male     Comment: Hysterectomy       I have reviewed the medical and surgical history, family history, social history, medications, and/or allergies, and the review of systems of this report.    Allergies   Allergen Reactions   • Penicillins          Current Outpatient Medications:   •  Accu-Chek Softclix Lancets lancets, Use as instructed, Disp: 100 each, Rfl: 12  •  Alcohol Swabs (B-D SINGLE USE SWABS REGULAR) pads, Use 3 times a day with insulin injections., Disp: 300 each, Rfl: 3  •  alendronate (FOSAMAX) 70 MG tablet, , Disp: , Rfl:   •  aspirin 81 MG EC tablet, Take 81 mg by mouth Daily., Disp:  , Rfl:   •  Blood Glucose Monitoring Suppl w/Device kit, Check tid E11.9 wants accu-check guide meter, Disp: 1 each, Rfl: 0  •  cyclobenzaprine (FLEXERIL) 5 MG tablet, TAKE ONE TABLET BY MOUTH TWICE A DAY AS NEEDED FOR MUSCLE SPASMS, Disp: 30 tablet, Rfl: 5  •  Diclofenac Sodium (VOLTAREN) 1 % gel gel, APPLY 2 GRAMS TO JOINTS OF UPPER EXTREMITY AND 4 GRAMS TO JOINTS OF LOWER EXTREMITY FOUR TIMES DAILY, Disp: , Rfl:   •  Empagliflozin (Jardiance) 10 MG tablet, Take 10 mg by mouth Daily., Disp: 90 tablet, Rfl: 3  •  glucose blood (Accu-Chek Dinora Plus) test strip, Use tid E11.9 DMII, Disp: 300 each, Rfl: 3  •  glucose blood test strip, E11.9 Test three times daily. Wants accu-check guide meter strips, Disp: 200 each, Rfl: 3  •  ibuprofen (ADVIL,MOTRIN) 800 MG tablet, Take 1 tablet by mouth Every 8 (Eight) Hours As Needed for Moderate Pain ., Disp: 30 tablet, Rfl: 5  •  insulin aspart (NovoLOG FlexPen) 100 UNIT/ML solution pen-injector sc pen, INJECT SUBCUTANEOUSLY PER BLOOD SUGAR PROTOCOL. PEN EXPIRES 28 DAYS AFTER OPENING, Disp: 30 mL, Rfl: 5  •  Insulin Degludec (Tresiba FlexTouch) 200 UNIT/ML solution pen-injector pen injection, Inject 60 Units under the skin into the appropriate area as directed Every Night., Disp: 27 pen, Rfl: 3  •  Insulin Pen Needle (B-D UF III MINI PEN NEEDLES) 31G X 5 MM misc, USE ONE PEN NEEDLE TO GIVE INSULIN SHOTS FOUR TIMES A DAY, Disp: 200 each, Rfl: 11  •  Lancets misc, Check three times daily, Disp: 200 each, Rfl: 2  •  losartan-hydrochlorothiazide (HYZAAR) 100-25 MG per tablet, Take 1 tablet by mouth Daily., Disp: 90 tablet, Rfl: 3  •  meloxicam (MOBIC) 15 MG tablet, TAKE 1 TABLET BY MOUTH EVERY DAY WITH FOOD AS DIRECTED AS NEEDED FOR PAIN, Disp: , Rfl:   •  meloxicam (MOBIC) 7.5 MG tablet, Take 1 tablet by mouth Daily., Disp: 30 tablet, Rfl: 5  •  metoprolol succinate XL (TOPROL-XL) 25 MG 24 hr tablet, Take 1 tablet by mouth Daily., Disp: 90 tablet, Rfl: 3  •  pregabalin (LYRICA) 150  "MG capsule, TAKE 1 CAPSULE BY MOUTH TWICE DAILY, Disp: 60 capsule, Rfl: 0    Objective   Temp 96.9 °F (36.1 °C)   Ht 157.5 cm (62\")   Wt 83.2 kg (183 lb 6.4 oz)   BMI 33.54 kg/m²    Physical Exam  Constitutional: Patient is oriented to person, place, and time. Patient appears well-developed and well-nourished.   HENT:Head: Normocephalic and atraumatic.   Eyes: EOM are normal. Pupils are equal, round, and reactive to light.   Neck: Normal range of motion. Neck supple.   Cardiovascular: Normal rate.    Pulmonary/Chest: Effort normal and breath sounds normal.   Abdominal: Soft.   Neurological: Patient is alert and oriented to person, place, and time.   Skin: Skin is warm and dry.   Psychiatric: Patient has a normal mood and affect.   Nursing note and vitals reviewed.       [unfilled]   Right knee: Overall neutral alignment full range of motion minimal warmth no effusion some crepitus the patellofemoral area Roger sign negative ligaments have stable calf supple neurovascularly intact strength well-preserved    Left knee: Overall neutral alignment full range of motion minimal warmth no effusion some crepitus the patellofemoral area Roger sign negative ligaments have stable calf supple neurovascularly intact strength well-preserved      Assessment/Plan   Review of Radiographic Studies:    Indication to evaluate joint condition, no comparison views available, shows evident chronic advanced osteoarthritis.      Procedures     Diagnoses and all orders for this visit:    1. Arthralgia of both knees (Primary)    2. Arthritis of knee  -     Ambulatory Referral to Physical Therapy Evaluate and treat       Physical therapy referral given      Recommendations/Plan:   Referral: PT/OT 2-3 x wk x 4-6 wks    Patient agreeable to call or return sooner for any concerns.         I reviewed the patient's radiographs indicating advanced osteoarthritis discussed the natural history treatment options and pros and cons and risks and " complications of surgical and nonsurgical care.  I also reviewed advantages and disadvantages risks and complications of steroid injections versus viscus gel injections.  The patient had opportunity to ask questions which were answered.    Impression:  Bilateral knee osteoarthritis with chondrocalcinosis  Plan:  Continue Mobic and Lyrica under Dr. Olmos's direction, she will call in the future if pain worsens to consider steroid injections to both knees

## 2021-09-23 DIAGNOSIS — M81.0 OSTEOPOROSIS WITHOUT CURRENT PATHOLOGICAL FRACTURE, UNSPECIFIED OSTEOPOROSIS TYPE: ICD-10-CM

## 2021-09-23 DIAGNOSIS — G89.29 CHRONIC BILATERAL LOW BACK PAIN WITHOUT SCIATICA: ICD-10-CM

## 2021-09-23 DIAGNOSIS — M54.50 CHRONIC BILATERAL LOW BACK PAIN WITHOUT SCIATICA: ICD-10-CM

## 2021-09-23 RX ORDER — IBUPROFEN 800 MG/1
800 TABLET ORAL EVERY 8 HOURS PRN
Qty: 30 TABLET | Refills: 5 | Status: SHIPPED | OUTPATIENT
Start: 2021-09-23 | End: 2022-05-23

## 2021-10-25 DIAGNOSIS — E11.40 DIABETIC NEUROPATHY, PAINFUL (HCC): ICD-10-CM

## 2021-10-25 DIAGNOSIS — M79.7 FIBROMYALGIA: ICD-10-CM

## 2021-10-26 DIAGNOSIS — E11.40 DIABETIC NEUROPATHY, PAINFUL (HCC): ICD-10-CM

## 2021-10-26 DIAGNOSIS — M79.7 FIBROMYALGIA: ICD-10-CM

## 2021-10-26 RX ORDER — PREGABALIN 150 MG/1
150 CAPSULE ORAL 2 TIMES DAILY
Qty: 60 CAPSULE | Refills: 0 | Status: CANCELLED | OUTPATIENT
Start: 2021-10-26

## 2021-10-26 RX ORDER — PREGABALIN 150 MG/1
CAPSULE ORAL
Qty: 60 CAPSULE | OUTPATIENT
Start: 2021-10-26

## 2021-10-27 DIAGNOSIS — E11.40 DIABETIC NEUROPATHY, PAINFUL (HCC): ICD-10-CM

## 2021-10-27 DIAGNOSIS — M79.7 FIBROMYALGIA: ICD-10-CM

## 2021-10-27 RX ORDER — PREGABALIN 150 MG/1
150 CAPSULE ORAL 2 TIMES DAILY
Qty: 60 CAPSULE | Refills: 0 | Status: SHIPPED | OUTPATIENT
Start: 2021-10-27 | End: 2022-01-10 | Stop reason: SDUPTHER

## 2021-10-28 ENCOUNTER — OFFICE VISIT (OUTPATIENT)
Dept: INTERNAL MEDICINE | Facility: CLINIC | Age: 66
End: 2021-10-28

## 2021-10-28 VITALS
HEART RATE: 72 BPM | DIASTOLIC BLOOD PRESSURE: 75 MMHG | OXYGEN SATURATION: 100 % | WEIGHT: 189 LBS | SYSTOLIC BLOOD PRESSURE: 129 MMHG | TEMPERATURE: 97.9 F | BODY MASS INDEX: 34.78 KG/M2 | RESPIRATION RATE: 15 BRPM | HEIGHT: 62 IN

## 2021-10-28 DIAGNOSIS — M79.7 FIBROMYALGIA: ICD-10-CM

## 2021-10-28 DIAGNOSIS — F51.01 PRIMARY INSOMNIA: ICD-10-CM

## 2021-10-28 DIAGNOSIS — I10 BENIGN ESSENTIAL HYPERTENSION: ICD-10-CM

## 2021-10-28 DIAGNOSIS — E11.40 DIABETIC NEUROPATHY, PAINFUL (HCC): ICD-10-CM

## 2021-10-28 DIAGNOSIS — Z79.899 LONG TERM USE OF DRUG: Primary | ICD-10-CM

## 2021-10-28 LAB
EXPIRATION DATE: ABNORMAL
HBA1C MFR BLD: 6.9 %
Lab: ABNORMAL
POC CREATININE URINE: 3000
POC MICROALBUMIN URINE: 10

## 2021-10-28 PROCEDURE — 82044 UR ALBUMIN SEMIQUANTITATIVE: CPT | Performed by: NURSE PRACTITIONER

## 2021-10-28 PROCEDURE — 99214 OFFICE O/P EST MOD 30 MIN: CPT | Performed by: NURSE PRACTITIONER

## 2021-10-28 PROCEDURE — 3044F HG A1C LEVEL LT 7.0%: CPT | Performed by: NURSE PRACTITIONER

## 2021-10-28 PROCEDURE — 83036 HEMOGLOBIN GLYCOSYLATED A1C: CPT | Performed by: NURSE PRACTITIONER

## 2021-10-28 RX ORDER — CYCLOBENZAPRINE HCL 5 MG
5 TABLET ORAL 2 TIMES DAILY PRN
Qty: 90 TABLET | Refills: 3 | Status: SHIPPED | OUTPATIENT
Start: 2021-10-28 | End: 2022-01-10 | Stop reason: SDUPTHER

## 2021-10-28 RX ORDER — MELOXICAM 15 MG/1
15 TABLET ORAL DAILY
Qty: 90 TABLET | Refills: 3 | Status: SHIPPED | OUTPATIENT
Start: 2021-10-28

## 2021-10-28 RX ORDER — PREGABALIN 150 MG/1
150 CAPSULE ORAL 2 TIMES DAILY
Qty: 60 CAPSULE | Refills: 0 | Status: CANCELLED | OUTPATIENT
Start: 2021-10-28

## 2021-10-28 RX ORDER — METOPROLOL SUCCINATE 25 MG/1
25 TABLET, EXTENDED RELEASE ORAL DAILY
Qty: 90 TABLET | Refills: 3 | Status: SHIPPED | OUTPATIENT
Start: 2021-10-28 | End: 2022-11-28

## 2021-10-28 RX ORDER — LOSARTAN POTASSIUM AND HYDROCHLOROTHIAZIDE 25; 100 MG/1; MG/1
1 TABLET ORAL DAILY
Qty: 90 TABLET | Refills: 3 | Status: SHIPPED | OUTPATIENT
Start: 2021-10-28 | End: 2022-12-21

## 2021-10-28 NOTE — PROGRESS NOTES
Chief Complaint / Reason:      Chief Complaint   Patient presents with   • Diabetes       Subjective     HPI  Answers for HPI/ROS submitted by the patient on 10/28/2021  Please describe your symptoms.: Medicine refills  Have you had these symptoms before?: No  How long have you been having these symptoms?: 1-4 days  Please list any medications you are currently taking for this condition.: Na  Please describe any probable cause for these symptoms. : Na  What is the primary reason for your visit?: Other  Patient is also due for urine drug screen in addition to controlled substance agreement.    History taken from: patient    PMH/FH/Social History were reviewed and updated appropriately in the electronic medical record.   Past Medical History:   Diagnosis Date   • Abdominal bloating    • Abdominal pain    • Allergic 1957    Penicillin   • Arthritis     Knees hips   • BMI 34.0-34.9,adult    • Cataract     Mini cataracts   • Diabetes mellitus (HCC)    • Diarrhea    • Diverticulosis     Noted in colon test   • Encounter for long-term (current) use of medications    • Fatigue    • Fibromyalgia, primary 2011    Entire body   • Hyperlipidemia    • Hypertension    • Low back pain     Detiorated disk   • Marked eosinophilia    • Myalgia and myositis    • Nausea    • Obesity     Weight at 235 at one time   • Osteopenia     Noted in test   • Pneumonia     Have had one occurrance of pneumonia   • Reaction to chronic stress    • RUQ abdominal pain    • Urinary tract infection     Uti have had many   • Viral gastroenteritis      Past Surgical History:   Procedure Laterality Date   • APPENDECTOMY      Taken out   • BILATERAL OOPHORECTOMY     • BLADDER SURGERY      Bladder tack : Mckay Marzetti procedure   • BREAST BIOPSY     • COLONOSCOPY      march 2015   • EYE SURGERY      To straighten pulled eyes   • HYSTERECTOMY       Social History     Socioeconomic History   • Marital status:    Tobacco Use   • Smoking status:  Never Smoker   • Smokeless tobacco: Never Used   Substance and Sexual Activity   • Alcohol use: No   • Drug use: No   • Sexual activity: Yes     Partners: Male     Comment: Hysterectomy     Family History   Problem Relation Age of Onset   • Arthritis Mother    • Hypertension Mother    • Stroke Mother         small stroke   • Hyperlipidemia Mother    • Cancer Father         lung cancer   • Lung cancer Father    • No Known Problems Sister    • Lymphoma Maternal Uncle    • Cancer Paternal Uncle    • Lung cancer Paternal Uncle    • No Known Problems Sister    • Cancer Maternal Uncle    • Lung cancer Maternal Uncle        Review of Systems:   Review of Systems   Constitutional: Positive for fatigue.   Respiratory: Negative.    Cardiovascular: Negative.    Musculoskeletal: Positive for arthralgias and myalgias.   Allergic/Immunologic: Positive for environmental allergies.   Neurological: Positive for numbness.   Psychiatric/Behavioral: Negative.  Positive for stress.         All other systems were reviewed and are negative.  Exceptions are noted in the subjective or above.      Objective     Vital Signs  Vitals:    10/28/21 1152   BP: 129/75   Pulse: 72   Resp: 15   Temp: 97.9 °F (36.6 °C)   SpO2: 100%       Body mass index is 34.57 kg/m².    Physical Exam  Vitals and nursing note reviewed.   Constitutional:       Appearance: She is well-developed.   Cardiovascular:      Rate and Rhythm: Normal rate and regular rhythm.      Heart sounds: Normal heart sounds.   Pulmonary:      Effort: Pulmonary effort is normal.      Breath sounds: Normal breath sounds.   Abdominal:      General: Bowel sounds are normal.      Palpations: Abdomen is soft.   Musculoskeletal:         General: Normal range of motion.   Skin:     General: Skin is warm and dry.      Capillary Refill: Capillary refill takes less than 2 seconds.   Neurological:      Mental Status: She is alert and oriented to person, place, and time.      Coordination:  Coordination normal.   Psychiatric:         Behavior: Behavior normal.         Thought Content: Thought content normal.         Judgment: Judgment normal.              Results Review:    I reviewed the patient's new clinical results.   Lab Results   Component Value Date    HGBA1C 6.9 10/28/2021    HGBA1C 6.50 (H) 07/06/2021    HGBA1C 6.50 (H) 09/21/2020     Office Visit on 10/28/2021   Component Date Value Ref Range Status   • Microalbumin, Urine 10/28/2021 10   Final   • Creatinine, Urine 10/28/2021 3,000   Final   • Hemoglobin A1C 10/28/2021 6.9  % Final   • Lot Number 10/28/2021 [id number]   Final   • Expiration Date 10/28/2021 [id number]   Final       [facility] Report   As part of this patient's treatment plan I am prescribing controlled substances. The patient has been made aware of appropriate use of such medications, including potential risk of somnolence, limited ability to drive and /or work safely, and potential for dependence or overdose. It has also been made clear that these medications are for use by this patient only, without concomitant use of alcohol or other substances unless prescribed.   Patient has completed prescribing agreement detailing terms of continued prescribing of controlled substances, including monitoring MUSA reports, urine drug screening, and pill counts if necessary. The patient is aware that inappropriate use will result in cessation of prescribing such medications.   MUSA report has been reviewed   History and physical exam exhibit continued safe and appropriate use of controlled substances.     CONTROLLED SUBSTANCE TRACKING 1/18/2019 2/15/2019 5/29/2019 9/19/2019 3/20/2020 6/11/2020 10/29/2021   Last Musa 1/18/2019 2/15/2019 5/29/2019 9/19/2019 3/20/2020 6/11/2020 10/28/2021   Report Number [id number]   Last UDS - - - - - - 10/28/2021   Last Controlled Substance Agreement 1/18/2019 - - - - - 10/28/2021   Disposal Education and  Agreement - - - - - - [id number]         Medication Review:     Current Outpatient Medications:   •  Accu-Chek Softclix Lancets lancets, Use as instructed, Disp: 100 each, Rfl: 12  •  Alcohol Swabs (B-D SINGLE USE SWABS REGULAR) pads, Use 3 times a day with insulin injections., Disp: 300 each, Rfl: 3  •  alendronate (FOSAMAX) 70 MG tablet, , Disp: , Rfl:   •  aspirin 81 MG EC tablet, Take 81 mg by mouth Daily., Disp: , Rfl:   •  Blood Glucose Monitoring Suppl w/Device kit, Check tid E11.9 wants accu-check guide meter, Disp: 1 each, Rfl: 0  •  cyclobenzaprine (FLEXERIL) 5 MG tablet, Take 1 tablet by mouth 2 (Two) Times a Day As Needed for Muscle Spasms., Disp: 90 tablet, Rfl: 3  •  Empagliflozin (Jardiance) 10 MG tablet, Take 10 mg by mouth Daily., Disp: 90 tablet, Rfl: 3  •  glucose blood (Accu-Chek Dinora Plus) test strip, Use tid E11.9 DMII, Disp: 300 each, Rfl: 3  •  glucose blood test strip, E11.9 Test three times daily. Wants accu-check guide meter strips, Disp: 200 each, Rfl: 3  •  ibuprofen (ADVIL,MOTRIN) 800 MG tablet, Take 1 tablet by mouth Every 8 (Eight) Hours As Needed for Moderate Pain ., Disp: 30 tablet, Rfl: 5  •  insulin aspart (NovoLOG FlexPen) 100 UNIT/ML solution pen-injector sc pen, INJECT SUBCUTANEOUSLY PER BLOOD SUGAR PROTOCOL. PEN EXPIRES 28 DAYS AFTER OPENING, Disp: 30 mL, Rfl: 5  •  Insulin Degludec (Tresiba FlexTouch) 200 UNIT/ML solution pen-injector pen injection, Inject 60 Units under the skin into the appropriate area as directed Every Night., Disp: 27 pen, Rfl: 3  •  Insulin Pen Needle (B-D UF III MINI PEN NEEDLES) 31G X 5 MM misc, USE ONE PEN NEEDLE TO GIVE INSULIN SHOTS FOUR TIMES A DAY, Disp: 200 each, Rfl: 11  •  Lancets misc, Check three times daily, Disp: 200 each, Rfl: 2  •  losartan-hydrochlorothiazide (HYZAAR) 100-25 MG per tablet, Take 1 tablet by mouth Daily., Disp: 90 tablet, Rfl: 3  •  meloxicam (MOBIC) 15 MG tablet, Take 1 tablet by mouth Daily., Disp: 90 tablet, Rfl: 3  •   metoprolol succinate XL (TOPROL-XL) 25 MG 24 hr tablet, Take 1 tablet by mouth Daily., Disp: 90 tablet, Rfl: 3  •  pregabalin (LYRICA) 150 MG capsule, Take 1 capsule by mouth 2 (Two) Times a Day., Disp: 60 capsule, Rfl: 0  •  Diclofenac Sodium (VOLTAREN) 1 % gel gel, APPLY 2 GRAMS TO JOINTS OF UPPER EXTREMITY AND 4 GRAMS TO JOINTS OF LOWER EXTREMITY FOUR TIMES DAILY, Disp: , Rfl:     Diagnoses and all orders for this visit:    Long term use of drug  -     Compliance Drug Analysis, Ur - Urine, Clean Catch    Benign essential hypertension  -     losartan-hydrochlorothiazide (HYZAAR) 100-25 MG per tablet; Take 1 tablet by mouth Daily.  -     metoprolol succinate XL (TOPROL-XL) 25 MG 24 hr tablet; Take 1 tablet by mouth Daily.  -     POCT microalbumin    Fibromyalgia  -     cyclobenzaprine (FLEXERIL) 5 MG tablet; Take 1 tablet by mouth 2 (Two) Times a Day As Needed for Muscle Spasms.    Primary insomnia  -     cyclobenzaprine (FLEXERIL) 5 MG tablet; Take 1 tablet by mouth 2 (Two) Times a Day As Needed for Muscle Spasms.    Diabetic neuropathy, painful (HCC)  -     POCT microalbumin  -     Compliance Drug Analysis, Ur - Urine, Clean Catch  -     POC Glycosylated Hemoglobin (Hb A1C)    Other orders  -     meloxicam (MOBIC) 15 MG tablet; Take 1 tablet by mouth Daily.      Advised patient to decrease dose of Mobic to help improve blood pressure and recommend close monitoring blood sugar and blood pressure.  Recommend adhere to cardiac diabetic diet.    Return in about 3 months (around 1/28/2022), or if symptoms worsen or fail to improve.    Carolina Soto, APRN  10/28/2021

## 2021-11-04 LAB — DRUGS UR: NORMAL

## 2021-11-17 DIAGNOSIS — E11.65 UNCONTROLLED TYPE 2 DIABETES MELLITUS WITH HYPERGLYCEMIA, WITH LONG-TERM CURRENT USE OF INSULIN (HCC): ICD-10-CM

## 2021-11-17 DIAGNOSIS — Z79.4 UNCONTROLLED TYPE 2 DIABETES MELLITUS WITH HYPERGLYCEMIA, WITH LONG-TERM CURRENT USE OF INSULIN (HCC): ICD-10-CM

## 2021-11-17 RX ORDER — BLOOD SUGAR DIAGNOSTIC
STRIP MISCELLANEOUS
Qty: 100 EACH | Refills: 11 | Status: SHIPPED | OUTPATIENT
Start: 2021-11-17

## 2021-12-27 ENCOUNTER — HOSPITAL ENCOUNTER (OUTPATIENT)
Dept: MAMMOGRAPHY | Facility: HOSPITAL | Age: 66
Discharge: HOME | End: 2021-12-27
Admitting: NURSE PRACTITIONER

## 2021-12-27 DIAGNOSIS — Z12.31 VISIT FOR SCREENING MAMMOGRAM: ICD-10-CM

## 2021-12-27 PROCEDURE — 77067 SCR MAMMO BI INCL CAD: CPT

## 2021-12-27 PROCEDURE — 77063 BREAST TOMOSYNTHESIS BI: CPT

## 2022-01-10 DIAGNOSIS — F51.01 PRIMARY INSOMNIA: ICD-10-CM

## 2022-01-10 DIAGNOSIS — M79.7 FIBROMYALGIA: ICD-10-CM

## 2022-01-10 DIAGNOSIS — E11.40 DIABETIC NEUROPATHY, PAINFUL: ICD-10-CM

## 2022-01-10 RX ORDER — CYCLOBENZAPRINE HCL 5 MG
5 TABLET ORAL 2 TIMES DAILY PRN
Qty: 90 TABLET | Refills: 3 | OUTPATIENT
Start: 2022-01-10 | End: 2022-08-28

## 2022-01-10 RX ORDER — PREGABALIN 150 MG/1
150 CAPSULE ORAL 2 TIMES DAILY
Qty: 180 CAPSULE | Refills: 0 | Status: SHIPPED | OUTPATIENT
Start: 2022-01-10 | End: 2022-04-13

## 2022-04-12 DIAGNOSIS — M79.7 FIBROMYALGIA: ICD-10-CM

## 2022-04-12 DIAGNOSIS — E11.40 DIABETIC NEUROPATHY, PAINFUL: ICD-10-CM

## 2022-04-13 RX ORDER — PREGABALIN 150 MG/1
CAPSULE ORAL
Qty: 180 CAPSULE | Refills: 1 | Status: SHIPPED | OUTPATIENT
Start: 2022-04-13 | End: 2022-09-19

## 2022-05-23 DIAGNOSIS — M54.50 CHRONIC BILATERAL LOW BACK PAIN WITHOUT SCIATICA: ICD-10-CM

## 2022-05-23 DIAGNOSIS — G89.29 CHRONIC BILATERAL LOW BACK PAIN WITHOUT SCIATICA: ICD-10-CM

## 2022-05-23 DIAGNOSIS — M81.0 OSTEOPOROSIS WITHOUT CURRENT PATHOLOGICAL FRACTURE, UNSPECIFIED OSTEOPOROSIS TYPE: ICD-10-CM

## 2022-05-23 RX ORDER — IBUPROFEN 800 MG/1
TABLET ORAL
Qty: 30 TABLET | Refills: 5 | Status: SHIPPED | OUTPATIENT
Start: 2022-05-23

## 2022-08-27 ENCOUNTER — E-VISIT (OUTPATIENT)
Dept: ADMINISTRATIVE | Facility: OTHER | Age: 67
End: 2022-08-27

## 2022-08-27 NOTE — E-VISIT ESCALATED
Chief Complaint: Coronavirus (COVID-19), cold, sinus pain, allergy, or flu   Patient was shown the following escalation message:   Some conditions need a visit with a healthcare provider   When you have trouble swallowing, it could mean you have a blocked airway. You should speak with a provider to get care.   If you start to have trouble breathing, go directly to the nearest emergency room.   Otherwise, select an option below.   ----------   Patient Interview Transcript:   Why are you getting care through eVisit today? We can't guarantee a specific treatment or test. Your provider will decide what's best for you. Select all that apply.    I want to know if I have a cold or something more serious   Not selected:    I want to know if I need to be seen by a provider    I need a doctor's note    I want to be tested for COVID-19    I want to get the COVID-19 vaccine    I think I'm having side effects from the COVID-19 vaccine    I just want to feel better!    None of the above   COVID-19 symptoms are similar to symptoms of other illnesses. This makes it difficult to diagnose. Please carefully consider each question and answer as best you can. This will help your provider make the right diagnosis. Which of these symptoms are bothering you? Select all that apply.    Cough    Stuffed-up nose or sinuses    Runny nose    Sore throat    Headache   Not selected:    Shortness of breath    Fever    Itchy or watery eyes    Itchy nose or sneezing    Loss of smell or taste    Hoarse voice or loss of voice    Sweats    Chills    Muscle or body aches    Fatigue or tiredness    Nausea or vomiting    Diarrhea    I don't have any of these symptoms   Do you have any of these conditions? These conditions can put you at increased risk for severe disease if you're infected with COVID-19. Select all that apply.    None of the above   Not selected:    Chronic lung disease, such as cystic fibrosis or interstitial fibrosis    Heart disease,  "such as congenital heart disease, congestive heart failure, or coronary artery disease    Disorder of the brain, spinal cord, or nerves and muscles, such as dementia, cerebral palsy, epilepsy, muscular dystrophy, or developmental delay    Metabolic disorder or mitochondrial disease    Cerebrovascular disease, such as stroke or another condition affecting the blood vessels or blood supply to the brain    Down syndrome    Mood disorder, including depression or schizophrenia spectrum disorders    Substance use disorder, such as alcohol, opioid, or cocaine use disorder    Tuberculosis   Do you live in a group care setting? Examples include: - Nursing home - Residential care - Psychiatric treatment facility - Group home - [facility] - [facility] and care home - Homeless shelter - Foster care setting Select one.    No   Not selected:    Yes   Have you ever been tested for COVID-19? Select one.    No   Not selected:    Yes   Have you gotten the COVID-19 vaccine? Select one.    Yes   Not selected:    No   How many doses of the COVID-19 vaccine have you gotten? This includes boosters as well as third or fourth doses for those who are immunocompromised. Select one.    2 doses   Not selected:    1 dose    3 doses    4 doses   1st dose    J&J/Gustavo   Not selected:    Moderna    Novavax    Pfizer   2nd dose    Moderna   Not selected:    J&J/Gustavo    Novavax    Pfizer   When did you get your most recent dose of the COVID-19 vaccine?    More than 14 days ago   Not selected:    Less than 48 hours (2 days) ago    48 to 72 hours (3 days) ago    3 to 5 days ago    5 to 7 days ago    7 to 14 days ago   In the last 14 days, have you traveled outside of your local community? This includes travel by car, RV, bus, train, or plane. Travel increases your chances of getting and spreading COVID-19. Select one.    No   Not selected:    Yes   In the last 14 days, have you had close contact with someone who has coronavirus (COVID-19)? \"Close " "contact\" means any of these: - Living in the same household as someone with COVID-19. - Caring for someone with COVID-19. - Being within 6 feet of someone with COVID-19 for a total of at least 15 minutes over a 24-hour period. For example, three 5-minute exposures for a total of 15 minutes. - Being in direct contact with respiratory droplets from someone with COVID-19 (being coughed on, kissing, sharing utensils). Select one.    No, not that I know of   Not selected:    Yes, a confirmed case    Yes, a suspected case   When did your current symptoms start? Select one.    Less than 48 hours ago   Not selected:    3 to 5 days ago    6 to 9 days ago    10 to 14 days ago    2 to 4 weeks ago    More than a month ago   Do you know the exact date your symptoms started? If so, enter the date as MM/DD/YY. Select one.    Yes (specify): 08/26/22   Not selected:    No   Did your symptoms come on suddenly or gradually? Select one.    Gradually   Not selected:    Suddenly    I'm not sure   You mentioned having a headache. On a scale of 1 to 10, how severe is your headache pain? Select one.    Mild (1 to 3)   Not selected:    Moderate (4 to 6)    Severe (7 to 9)    Unbearable (10)    The worst headache of my life (10+)   Do you cough so hard that it's made you gag or vomit? By gag, we mean has your coughing made you choke or dry heave? Select all that apply.    No   Not selected:    Yes, my coughing has made me gag    Yes, my coughing has made me vomit   When is your cough the worst? Select all that apply.    I haven't noticed a difference depending on time of day   Not selected:    In the morning, or when I wake up    During the day    At nighttime, or while I'm sleeping   Are you coughing up mucus or phlegm? Select one.    Yes, a little   Not selected:    No, my cough is dry    Yes, a lot   What color is most of the mucus or phlegm that you're coughing up? Select one.    Green   Not selected:    Clear    White/frothy    Yellow   " " Red or pink    I'm not sure   You mentioned having a stuffy nose or sinus congestion. Do you feel pain or pressure in your sinuses?    No   Not selected:    Yes    I'm not sure   What color is your nasal drainage? Select one.    Clear   Not selected:    White    Yellow    Green    My nose is stuffed but not draining or running    I'm not sure   Is your nasal drainage thick or thin? Select one.    Thin   Not selected:    Thick    I'm not sure   Is there any drainage (mucus) going down the back of your throat? This kind of drainage is also called \"postnasal drip.\" Select one.    Yes   Not selected:    No    I'm not sure   Can you swallow liquids and solid foods? A sore throat may be painful when swallowing, but it shouldn't prevent you from swallowing. Select one.    It's hard to swallow anything because it feels like liquids and food get stuck in my throat   Not selected:    Yes, with ease    Yes, but it's uncomfortable    Yes, but it's painful    No, I can't swallow anything, liquid or solid foods   ----------   Medical history   Medical history data does not currently exist for this patient.    "

## 2022-09-16 ENCOUNTER — OFFICE VISIT (OUTPATIENT)
Dept: INTERNAL MEDICINE | Facility: CLINIC | Age: 67
End: 2022-09-16

## 2022-09-16 VITALS
SYSTOLIC BLOOD PRESSURE: 127 MMHG | OXYGEN SATURATION: 98 % | TEMPERATURE: 97.1 F | HEIGHT: 62 IN | DIASTOLIC BLOOD PRESSURE: 70 MMHG | RESPIRATION RATE: 16 BRPM | WEIGHT: 190 LBS | BODY MASS INDEX: 34.96 KG/M2 | HEART RATE: 72 BPM

## 2022-09-16 DIAGNOSIS — E11.40 DIABETIC NEUROPATHY, PAINFUL: ICD-10-CM

## 2022-09-16 DIAGNOSIS — E11.65 UNCONTROLLED TYPE 2 DIABETES MELLITUS WITH HYPERGLYCEMIA, WITH LONG-TERM CURRENT USE OF INSULIN: ICD-10-CM

## 2022-09-16 DIAGNOSIS — Z00.00 MEDICARE ANNUAL WELLNESS VISIT, SUBSEQUENT: Primary | ICD-10-CM

## 2022-09-16 DIAGNOSIS — E66.09 CLASS 1 OBESITY DUE TO EXCESS CALORIES WITH SERIOUS COMORBIDITY AND BODY MASS INDEX (BMI) OF 34.0 TO 34.9 IN ADULT: ICD-10-CM

## 2022-09-16 DIAGNOSIS — E78.2 MIXED HYPERLIPIDEMIA: ICD-10-CM

## 2022-09-16 DIAGNOSIS — Z79.4 UNCONTROLLED TYPE 2 DIABETES MELLITUS WITH HYPERGLYCEMIA, WITH LONG-TERM CURRENT USE OF INSULIN: ICD-10-CM

## 2022-09-16 DIAGNOSIS — I10 BENIGN ESSENTIAL HYPERTENSION: ICD-10-CM

## 2022-09-16 DIAGNOSIS — R53.83 FATIGUE, UNSPECIFIED TYPE: ICD-10-CM

## 2022-09-16 DIAGNOSIS — M79.7 FIBROMYALGIA: ICD-10-CM

## 2022-09-16 DIAGNOSIS — E55.9 VITAMIN D DEFICIENCY: ICD-10-CM

## 2022-09-16 DIAGNOSIS — Z23 PNEUMOCOCCAL VACCINATION GIVEN: ICD-10-CM

## 2022-09-16 PROCEDURE — G0439 PPPS, SUBSEQ VISIT: HCPCS | Performed by: NURSE PRACTITIONER

## 2022-09-16 PROCEDURE — 90677 PCV20 VACCINE IM: CPT | Performed by: NURSE PRACTITIONER

## 2022-09-16 PROCEDURE — 96160 PT-FOCUSED HLTH RISK ASSMT: CPT | Performed by: NURSE PRACTITIONER

## 2022-09-16 PROCEDURE — G0009 ADMIN PNEUMOCOCCAL VACCINE: HCPCS | Performed by: NURSE PRACTITIONER

## 2022-09-16 PROCEDURE — 1170F FXNL STATUS ASSESSED: CPT | Performed by: NURSE PRACTITIONER

## 2022-09-16 PROCEDURE — 1159F MED LIST DOCD IN RCRD: CPT | Performed by: NURSE PRACTITIONER

## 2022-09-16 PROCEDURE — 99397 PER PM REEVAL EST PAT 65+ YR: CPT | Performed by: NURSE PRACTITIONER

## 2022-09-16 RX ORDER — DULOXETIN HYDROCHLORIDE 60 MG/1
CAPSULE, DELAYED RELEASE ORAL
COMMUNITY
Start: 2022-09-14

## 2022-09-16 RX ORDER — TIRZEPATIDE 5 MG/.5ML
5 INJECTION, SOLUTION SUBCUTANEOUS WEEKLY
Qty: 2 ML | Refills: 2 | Status: SHIPPED | OUTPATIENT
Start: 2022-09-16 | End: 2023-01-09

## 2022-09-17 ENCOUNTER — LAB (OUTPATIENT)
Dept: LAB | Facility: HOSPITAL | Age: 67
End: 2022-09-17

## 2022-09-17 LAB
25(OH)D3 SERPL-MCNC: 48.7 NG/ML (ref 30–100)
ALBUMIN SERPL-MCNC: 4.4 G/DL (ref 3.5–5.2)
ALBUMIN/GLOB SERPL: 2.2 G/DL
ALP SERPL-CCNC: 43 U/L (ref 39–117)
ALT SERPL W P-5'-P-CCNC: 58 U/L (ref 1–33)
ANION GAP SERPL CALCULATED.3IONS-SCNC: 13.1 MMOL/L (ref 5–15)
AST SERPL-CCNC: 38 U/L (ref 1–32)
BASOPHILS # BLD AUTO: 0.07 10*3/MM3 (ref 0–0.2)
BASOPHILS NFR BLD AUTO: 0.6 % (ref 0–1.5)
BILIRUB SERPL-MCNC: 0.9 MG/DL (ref 0–1.2)
BUN SERPL-MCNC: 19 MG/DL (ref 8–23)
BUN/CREAT SERPL: 23.8 (ref 7–25)
CALCIUM SPEC-SCNC: 9.7 MG/DL (ref 8.6–10.5)
CHLORIDE SERPL-SCNC: 101 MMOL/L (ref 98–107)
CHOLEST SERPL-MCNC: 192 MG/DL (ref 0–200)
CO2 SERPL-SCNC: 23.9 MMOL/L (ref 22–29)
CREAT SERPL-MCNC: 0.8 MG/DL (ref 0.57–1)
DEPRECATED RDW RBC AUTO: 40.6 FL (ref 37–54)
EGFRCR SERPLBLD CKD-EPI 2021: 81.4 ML/MIN/1.73
EOSINOPHIL # BLD AUTO: 0.39 10*3/MM3 (ref 0–0.4)
EOSINOPHIL NFR BLD AUTO: 3.6 % (ref 0.3–6.2)
ERYTHROCYTE [DISTWIDTH] IN BLOOD BY AUTOMATED COUNT: 12.4 % (ref 12.3–15.4)
GLOBULIN UR ELPH-MCNC: 2 GM/DL
GLUCOSE SERPL-MCNC: 134 MG/DL (ref 65–99)
HBA1C MFR BLD: 7.5 % (ref 4.8–5.6)
HCT VFR BLD AUTO: 49.5 % (ref 34–46.6)
HDLC SERPL-MCNC: 47 MG/DL (ref 40–60)
HGB BLD-MCNC: 17.5 G/DL (ref 12–15.9)
IMM GRANULOCYTES # BLD AUTO: 0.06 10*3/MM3 (ref 0–0.05)
IMM GRANULOCYTES NFR BLD AUTO: 0.5 % (ref 0–0.5)
LDLC SERPL CALC-MCNC: 123 MG/DL (ref 0–100)
LDLC/HDLC SERPL: 2.57 {RATIO}
LYMPHOCYTES # BLD AUTO: 2.11 10*3/MM3 (ref 0.7–3.1)
LYMPHOCYTES NFR BLD AUTO: 19.3 % (ref 19.6–45.3)
MCH RBC QN AUTO: 31.4 PG (ref 26.6–33)
MCHC RBC AUTO-ENTMCNC: 35.4 G/DL (ref 31.5–35.7)
MCV RBC AUTO: 88.7 FL (ref 79–97)
MONOCYTES # BLD AUTO: 0.87 10*3/MM3 (ref 0.1–0.9)
MONOCYTES NFR BLD AUTO: 8 % (ref 5–12)
NEUTROPHILS NFR BLD AUTO: 68 % (ref 42.7–76)
NEUTROPHILS NFR BLD AUTO: 7.42 10*3/MM3 (ref 1.7–7)
NRBC BLD AUTO-RTO: 0 /100 WBC (ref 0–0.2)
PLATELET # BLD AUTO: 238 10*3/MM3 (ref 140–450)
PMV BLD AUTO: 10.8 FL (ref 6–12)
POTASSIUM SERPL-SCNC: 3.9 MMOL/L (ref 3.5–5.2)
PROT SERPL-MCNC: 6.4 G/DL (ref 6–8.5)
RBC # BLD AUTO: 5.58 10*6/MM3 (ref 3.77–5.28)
SODIUM SERPL-SCNC: 138 MMOL/L (ref 136–145)
T4 FREE SERPL-MCNC: 1.18 NG/DL (ref 0.93–1.7)
TRIGL SERPL-MCNC: 120 MG/DL (ref 0–150)
TSH SERPL DL<=0.05 MIU/L-ACNC: 0.82 UIU/ML (ref 0.27–4.2)
VIT B12 BLD-MCNC: 765 PG/ML (ref 211–946)
VLDLC SERPL-MCNC: 22 MG/DL (ref 5–40)
WBC NRBC COR # BLD: 10.92 10*3/MM3 (ref 3.4–10.8)

## 2022-09-17 PROCEDURE — 82607 VITAMIN B-12: CPT | Performed by: NURSE PRACTITIONER

## 2022-09-17 PROCEDURE — 84439 ASSAY OF FREE THYROXINE: CPT | Performed by: NURSE PRACTITIONER

## 2022-09-17 PROCEDURE — 84443 ASSAY THYROID STIM HORMONE: CPT | Performed by: NURSE PRACTITIONER

## 2022-09-17 PROCEDURE — 82306 VITAMIN D 25 HYDROXY: CPT | Performed by: NURSE PRACTITIONER

## 2022-09-17 PROCEDURE — 83036 HEMOGLOBIN GLYCOSYLATED A1C: CPT | Performed by: NURSE PRACTITIONER

## 2022-09-17 PROCEDURE — 80053 COMPREHEN METABOLIC PANEL: CPT | Performed by: NURSE PRACTITIONER

## 2022-09-17 PROCEDURE — 80061 LIPID PANEL: CPT | Performed by: NURSE PRACTITIONER

## 2022-09-17 PROCEDURE — 36415 COLL VENOUS BLD VENIPUNCTURE: CPT | Performed by: NURSE PRACTITIONER

## 2022-09-17 PROCEDURE — 85025 COMPLETE CBC W/AUTO DIFF WBC: CPT | Performed by: NURSE PRACTITIONER

## 2022-09-19 ENCOUNTER — PRIOR AUTHORIZATION (OUTPATIENT)
Dept: INTERNAL MEDICINE | Facility: CLINIC | Age: 67
End: 2022-09-19

## 2022-09-19 RX ORDER — PREGABALIN 150 MG/1
CAPSULE ORAL
Qty: 180 CAPSULE | Refills: 1 | Status: SHIPPED | OUTPATIENT
Start: 2022-09-19 | End: 2023-03-27

## 2022-11-03 ENCOUNTER — TELEMEDICINE (OUTPATIENT)
Dept: FAMILY MEDICINE CLINIC | Facility: TELEHEALTH | Age: 67
End: 2022-11-03

## 2022-11-03 ENCOUNTER — E-VISIT (OUTPATIENT)
Dept: ADMINISTRATIVE | Facility: OTHER | Age: 67
End: 2022-11-03

## 2022-11-03 DIAGNOSIS — R39.89 SUSPECTED UTI: Primary | ICD-10-CM

## 2022-11-03 PROCEDURE — 99213 OFFICE O/P EST LOW 20 MIN: CPT | Performed by: NURSE PRACTITIONER

## 2022-11-03 RX ORDER — SULFAMETHOXAZOLE AND TRIMETHOPRIM 800; 160 MG/1; MG/1
1 TABLET ORAL 2 TIMES DAILY
Qty: 10 TABLET | Refills: 0 | Status: SHIPPED | OUTPATIENT
Start: 2022-11-03 | End: 2022-11-08

## 2022-11-03 NOTE — PROGRESS NOTES
CHIEF COMPLAINT  Chief Complaint   Patient presents with   • Urinary Tract Infection         HPI  Mercy Clemens is a 67 y.o. female  presents with complaint of symptoms of UTI. She has some back pain which is typical for her and this morning a foul odor. She has had urgency and frequency during the night. She has taken Bactrim DS in the past with good results.     Review of Systems   Constitutional: Negative for chills, diaphoresis, fatigue and fever.   Gastrointestinal: Negative for nausea and vomiting.   Genitourinary: Positive for dysuria, frequency and urgency. Negative for decreased urine volume, enuresis, flank pain, genital sores, hematuria, menstrual problem, pelvic pain, vaginal bleeding, vaginal discharge and vaginal pain.   Musculoskeletal: Positive for back pain (low back ).       Past Medical History:   Diagnosis Date   • Abdominal bloating    • Abdominal pain    • Allergic 1957    Penicillin   • Arthritis     Knees hips   • BMI 34.0-34.9,adult    • Cataract     Mini cataracts   • Diabetes mellitus (HCC)    • Diarrhea    • Diverticulosis     Noted in colon test   • Encounter for long-term (current) use of medications    • Fatigue    • Fibromyalgia, primary 2011    Entire body   • Hyperlipidemia    • Hypertension    • Low back pain     Detiorated disk   • Marked eosinophilia    • Myalgia and myositis    • Nausea    • Obesity     Weight at 235 at one time   • Osteopenia     Noted in test   • Pneumonia     Have had one occurrance of pneumonia   • Reaction to chronic stress    • RUQ abdominal pain    • Urinary tract infection     Uti have had many   • Viral gastroenteritis        Family History   Problem Relation Age of Onset   • Arthritis Mother    • Hypertension Mother    • Stroke Mother         small stroke   • Hyperlipidemia Mother    • Cancer Father         lung cancer   • Lung cancer Father    • No Known Problems Sister    • Lymphoma Maternal Uncle    • Cancer Paternal Uncle    • Lung cancer Paternal  Uncle    • No Known Problems Sister    • Cancer Maternal Uncle    • Lung cancer Maternal Uncle        Social History     Socioeconomic History   • Marital status:    Tobacco Use   • Smoking status: Never   • Smokeless tobacco: Never   Vaping Use   • Vaping Use: Never used   Substance and Sexual Activity   • Alcohol use: No   • Drug use: No   • Sexual activity: Yes     Partners: Male     Comment: Hysterectomy       Mercy Clemens  reports that she has never smoked. She has never used smokeless tobacco.       Breastfeeding No     PHYSICAL EXAM  Physical Exam   Constitutional: She is oriented to person, place, and time. She appears well-developed and well-nourished. She does not have a sickly appearance. She does not appear ill. No distress.   HENT:   Head: Normocephalic and atraumatic.   Eyes: EOM are normal.   Neck: Neck normal appearance.  Pulmonary/Chest: Effort normal.  No respiratory distress.  Neurological: She is alert and oriented to person, place, and time.   Skin: Skin is dry.   Psychiatric: She has a normal mood and affect.         Diagnoses and all orders for this visit:    1. Suspected UTI (Primary)    Other orders  -     sulfamethoxazole-trimethoprim (Bactrim DS) 800-160 MG per tablet; Take 1 tablet by mouth 2 (Two) Times a Day for 5 days.  Dispense: 10 tablet; Refill: 0        The use of a video visit has been reviewed with the patient and verbal informd consent has een obtained. Myself and Mercy Clemens participated in this visit. The patient is located in [address] [place], [place] [id number]. I am located in [place], [place]. Mychart and Zoom were utilized.       Note Disclaimer: At [facility], we believe that sharing information builds trust and better   relationships. You are receiving this note because you recently visited [facility]. It is possible you   will see health information before a provider has talked with you about it. This kind of information can   be easy to misunderstand. To  help you fully understand what it means for your health, we urge you to   discuss this note with your provider.    LARA Newsome  11/03/2022  07:17 EDT

## 2022-11-03 NOTE — PATIENT INSTRUCTIONS
Drink plenty of water and avoid caffeine  If symptoms do not improve in 3-5 days follow up with your primary care provider or urgent care     Urinary Tract Infection, Adult  A urinary tract infection (UTI) is an infection of any part of the urinary tract. The urinary tract includes:  The kidneys.  The ureters.  The bladder.  The urethra.  These organs make, store, and get rid of pee (urine) in the body.  What are the causes?  This infection is caused by germs (bacteria) in your genital area. These germs grow and cause swelling (inflammation) of your urinary tract.  What increases the risk?  The following factors may make you more likely to develop this condition:  Using a small, thin tube (catheter) to drain pee.  Not being able to control when you pee or poop (incontinence).  Being female. If you are female, these things can increase the risk:  Using these methods to prevent pregnancy:  A medicine that kills sperm (spermicide).  A device that blocks sperm (diaphragm).  Having low levels of a female hormone (estrogen).  Being pregnant.  You are more likely to develop this condition if:  You have genes that add to your risk.  You are sexually active.  You take antibiotic medicines.  You have trouble peeing because of:  A prostate that is bigger than normal, if you are male.  A blockage in the part of your body that drains pee from the bladder.  A kidney stone.  A nerve condition that affects your bladder.  Not getting enough to drink.  Not peeing often enough.  You have other conditions, such as:  Diabetes.  A weak disease-fighting system (immune system).  Sickle cell disease.  Gout.  Injury of the spine.  What are the signs or symptoms?  Symptoms of this condition include:  Needing to pee right away.  Peeing small amounts often.  Pain or burning when peeing.  Blood in the pee.  Pee that smells bad or not like normal.  Trouble peeing.  Pee that is cloudy.  Fluid coming from the vagina, if you are female.  Pain in the  belly or lower back.  Other symptoms include:  Vomiting.  Not feeling hungry.  Feeling mixed up (confused). This may be the first symptom in older adults.  Being tired and grouchy (irritable).  A fever.  Watery poop (diarrhea).  How is this treated?  Taking antibiotic medicine.  Taking other medicines.  Drinking enough water.  In some cases, you may need to see a specialist.  Follow these instructions at home:  Medicines  Take over-the-counter and prescription medicines only as told by your doctor.  If you were prescribed an antibiotic medicine, take it as told by your doctor. Do not stop taking it even if you start to feel better.  General instructions  Make sure you:  Pee until your bladder is empty.  Do not hold pee for a long time.  Empty your bladder after sex.  Wipe from front to back after peeing or pooping if you are a female. Use each tissue one time when you wipe.  Drink enough fluid to keep your pee pale yellow.  Keep all follow-up visits.  Contact a doctor if:  You do not get better after 1-2 days.  Your symptoms go away and then come back.  Get help right away if:  You have very bad back pain.  You have very bad pain in your lower belly.  You have a fever.  You have chills.  You feeling like you will vomit or you vomit.  Summary  A urinary tract infection (UTI) is an infection of any part of the urinary tract.  This condition is caused by germs in your genital area.  There are many risk factors for a UTI.  Treatment includes antibiotic medicines.  Drink enough fluid to keep your pee pale yellow.  This information is not intended to replace advice given to you by your health care provider. Make sure you discuss any questions you have with your health care provider.  Document Revised: 07/30/2021 Document Reviewed: 07/30/2021  [employer] Patient Education © 2022 [employer]

## 2022-11-03 NOTE — E-VISIT ESCALATED
Chief Complaint: Yeast infection   Patient was shown the following escalation message:   This interview isn't the best fit for your symptoms   Because you have urinary symptoms rather than vaginal symptoms, you should speak with a provider to get care. Please consider making an appointment to speak with a provider instead.   ----------   Patient Interview Transcript:   Which of these symptoms are bothering you? Select all that apply.    Burning with urination   Not selected:    Itching around my vagina    Itching in my vagina    Burning around my vagina    Burning in my vagina    Vaginal discharge that looks different than usual    Fishy-smelling vaginal discharge    Pain during sex    None of the above   Do you have any of these symptoms? Select all that apply.    Frequent, small amounts of urine    Strong-smelling urine   Not selected:    Sudden, strong urge to urinate    Cloudy urine    Blood in the urine    None of the above   ----------   Medical history   Medical history data does not currently exist for this patient.

## 2022-11-25 DIAGNOSIS — I10 BENIGN ESSENTIAL HYPERTENSION: ICD-10-CM

## 2022-11-28 RX ORDER — METOPROLOL SUCCINATE 25 MG/1
TABLET, EXTENDED RELEASE ORAL
Qty: 90 TABLET | Refills: 3 | Status: SHIPPED | OUTPATIENT
Start: 2022-11-28

## 2022-12-08 ENCOUNTER — TRANSCRIBE ORDERS (OUTPATIENT)
Dept: ADMINISTRATIVE | Facility: HOSPITAL | Age: 67
End: 2022-12-08

## 2022-12-08 DIAGNOSIS — Z12.31 VISIT FOR SCREENING MAMMOGRAM: Primary | ICD-10-CM

## 2022-12-20 DIAGNOSIS — I10 BENIGN ESSENTIAL HYPERTENSION: ICD-10-CM

## 2022-12-21 RX ORDER — LOSARTAN POTASSIUM AND HYDROCHLOROTHIAZIDE 25; 100 MG/1; MG/1
TABLET ORAL
Qty: 90 TABLET | Refills: 3 | Status: SHIPPED | OUTPATIENT
Start: 2022-12-21

## 2023-01-09 DIAGNOSIS — E11.65 UNCONTROLLED TYPE 2 DIABETES MELLITUS WITH HYPERGLYCEMIA, WITH LONG-TERM CURRENT USE OF INSULIN: ICD-10-CM

## 2023-01-09 DIAGNOSIS — Z79.4 UNCONTROLLED TYPE 2 DIABETES MELLITUS WITH HYPERGLYCEMIA, WITH LONG-TERM CURRENT USE OF INSULIN: ICD-10-CM

## 2023-01-09 RX ORDER — TIRZEPATIDE 5 MG/.5ML
INJECTION, SOLUTION SUBCUTANEOUS
Qty: 2 ML | Refills: 2 | Status: SHIPPED | OUTPATIENT
Start: 2023-01-09

## 2023-02-28 ENCOUNTER — HOSPITAL ENCOUNTER (OUTPATIENT)
Dept: MAMMOGRAPHY | Facility: HOSPITAL | Age: 68
Discharge: HOME | End: 2023-02-28
Admitting: NURSE PRACTITIONER
Payer: MEDICARE

## 2023-02-28 DIAGNOSIS — Z12.31 VISIT FOR SCREENING MAMMOGRAM: ICD-10-CM

## 2023-02-28 PROCEDURE — 77067 SCR MAMMO BI INCL CAD: CPT

## 2023-02-28 PROCEDURE — 77063 BREAST TOMOSYNTHESIS BI: CPT

## 2023-03-27 DIAGNOSIS — E11.40 DIABETIC NEUROPATHY, PAINFUL: ICD-10-CM

## 2023-03-27 DIAGNOSIS — M79.7 FIBROMYALGIA: ICD-10-CM

## 2023-03-27 RX ORDER — PREGABALIN 150 MG/1
CAPSULE ORAL
Qty: 180 CAPSULE | Refills: 1 | Status: SHIPPED | OUTPATIENT
Start: 2023-03-27

## 2023-03-27 NOTE — TELEPHONE ENCOUNTER
Rx Refill Note  Requested Prescriptions     Pending Prescriptions Disp Refills   • pregabalin (LYRICA) 150 MG capsule [Pharmacy Med Name: PREGABALIN 150 MG Capsule] 180 capsule      Sig: TAKE 1 CAPSULE TWICE DAILY      Last office visit with prescribing clinician: 9/16/2022   Last telemedicine visit with prescribing clinician: Visit date not found   Next office visit with prescribing clinician: Visit date not found                         Would you like a call back once the refill request has been completed: [] Yes [] No    If the office needs to give you a call back, can they leave a voicemail: [] Yes [] No    Faraz Brennan MA  03/27/23, 11:09 EDT   ambulatory

## 2023-03-30 ENCOUNTER — TELEPHONE (OUTPATIENT)
Dept: INTERNAL MEDICINE | Facility: CLINIC | Age: 68
End: 2023-03-30
Payer: MEDICARE

## 2023-03-30 DIAGNOSIS — E11.65 TYPE 2 DIABETES MELLITUS WITH HYPERGLYCEMIA, WITH LONG-TERM CURRENT USE OF INSULIN: ICD-10-CM

## 2023-03-30 DIAGNOSIS — Z79.4 TYPE 2 DIABETES MELLITUS WITH HYPERGLYCEMIA, WITH LONG-TERM CURRENT USE OF INSULIN: ICD-10-CM

## 2023-03-30 RX ORDER — INSULIN ASPART 100 [IU]/ML
INJECTION, SOLUTION INTRAVENOUS; SUBCUTANEOUS
Qty: 30 ML | Refills: 5 | Status: SHIPPED | OUTPATIENT
Start: 2023-03-30

## 2023-03-30 NOTE — TELEPHONE ENCOUNTER
Caller: [facility] - [facility] [address] [facility] - [phone] [facility] [phone] FX    Relationship: Pharmacy    Best call back number: [phone]    Requested Prescriptions:   Requested Prescriptions     Pending Prescriptions Disp Refills   • insulin aspart (NovoLOG FlexPen) 100 UNIT/ML solution pen-injector sc pen 30 mL 5     Sig: INJECT SUBCUTANEOUSLY PER BLOOD SUGAR PROTOCOL. PEN EXPIRES 28 DAYS AFTER OPENING        Pharmacy where request should be sent: [facility] - [facility] [address] [facility] - [phone] [facility] [phone] FX     Last office visit with prescribing clinician: 9/16/2022   Last telemedicine visit with prescribing clinician: Visit date not found   Next office visit with prescribing clinician: Visit date not found     Additional details provided by patient: PHARMACY CALLING, UNSURE OF NEED    Abrahan Gonzalez Rep   03/30/23 16:55 EDT

## 2023-04-07 ENCOUNTER — TELEPHONE (OUTPATIENT)
Dept: INTERNAL MEDICINE | Facility: CLINIC | Age: 68
End: 2023-04-07
Payer: MEDICARE

## 2023-04-07 DIAGNOSIS — E11.65 TYPE 2 DIABETES MELLITUS WITH HYPERGLYCEMIA, WITH LONG-TERM CURRENT USE OF INSULIN: ICD-10-CM

## 2023-04-07 DIAGNOSIS — Z79.4 TYPE 2 DIABETES MELLITUS WITH HYPERGLYCEMIA, WITH LONG-TERM CURRENT USE OF INSULIN: ICD-10-CM

## 2023-04-07 NOTE — TELEPHONE ENCOUNTER
Pharmacist needs to know the maximum insulin dose per day related to patient's sliding scale.      [facility] pharmacy [phone]

## 2023-04-10 DIAGNOSIS — E11.65 TYPE 2 DIABETES MELLITUS WITH HYPERGLYCEMIA, WITH LONG-TERM CURRENT USE OF INSULIN: ICD-10-CM

## 2023-04-10 DIAGNOSIS — Z79.4 TYPE 2 DIABETES MELLITUS WITH HYPERGLYCEMIA, WITH LONG-TERM CURRENT USE OF INSULIN: ICD-10-CM

## 2023-04-10 DIAGNOSIS — E11.65 UNCONTROLLED TYPE 2 DIABETES MELLITUS WITH HYPERGLYCEMIA, WITH LONG-TERM CURRENT USE OF INSULIN: ICD-10-CM

## 2023-04-10 DIAGNOSIS — Z79.4 UNCONTROLLED TYPE 2 DIABETES MELLITUS WITH HYPERGLYCEMIA, WITH LONG-TERM CURRENT USE OF INSULIN: ICD-10-CM

## 2023-04-10 RX ORDER — TIRZEPATIDE 5 MG/.5ML
INJECTION, SOLUTION SUBCUTANEOUS
Qty: 2 ML | Refills: 2 | Status: SHIPPED | OUTPATIENT
Start: 2023-04-10

## 2023-04-10 RX ORDER — INSULIN ASPART 100 [IU]/ML
INJECTION, SOLUTION INTRAVENOUS; SUBCUTANEOUS
Qty: 30 ML | Refills: 5 | OUTPATIENT
Start: 2023-04-10

## 2023-04-10 NOTE — TELEPHONE ENCOUNTER
Rx Refill Note  Requested Prescriptions     Pending Prescriptions Disp Refills   • Mounjaro 5 MG/0.5ML solution pen-injector [Pharmacy Med Name: MOUNJARO 5 MG/0.5 ML PEN] 2 mL 2     Sig: INJECT 5 MG UNDER THE SKIN ONCE WEEKLY      Last office visit with prescribing clinician: 9/16/2022   Last telemedicine visit with prescribing clinician: Visit date not found   Next office visit with prescribing clinician: Visit date not found                         Would you like a call back once the refill request has been completed: [] Yes [] No    If the office needs to give you a call back, can they leave a voicemail: [] Yes [] No    Faraz Brennan MA  04/10/23, 07:58 EDT

## 2023-04-10 NOTE — TELEPHONE ENCOUNTER
Linda has been out of the office. A telephone call was sent to Linda once a response is given we will contact the pharmacy.

## 2023-04-11 DIAGNOSIS — Z79.4 UNCONTROLLED TYPE 2 DIABETES MELLITUS WITH HYPERGLYCEMIA, WITH LONG-TERM CURRENT USE OF INSULIN: ICD-10-CM

## 2023-04-11 DIAGNOSIS — E11.65 UNCONTROLLED TYPE 2 DIABETES MELLITUS WITH HYPERGLYCEMIA, WITH LONG-TERM CURRENT USE OF INSULIN: ICD-10-CM

## 2023-04-11 RX ORDER — BLOOD SUGAR DIAGNOSTIC
STRIP MISCELLANEOUS
Qty: 200 EACH | Refills: 2 | Status: SHIPPED | OUTPATIENT
Start: 2023-04-11 | End: 2023-04-14 | Stop reason: SDUPTHER

## 2023-04-11 NOTE — TELEPHONE ENCOUNTER
Please contact patient and see how much insulin she is taking as she was taking 20 units and then contact pharmacy and let them know.

## 2023-04-12 DIAGNOSIS — M79.7 FIBROMYALGIA: ICD-10-CM

## 2023-04-12 DIAGNOSIS — F51.01 PRIMARY INSOMNIA: ICD-10-CM

## 2023-04-12 RX ORDER — INSULIN ASPART 100 [IU]/ML
INJECTION, SOLUTION INTRAVENOUS; SUBCUTANEOUS
Qty: 30 ML | Refills: 5 | Status: SHIPPED | OUTPATIENT
Start: 2023-04-12 | End: 2023-04-14 | Stop reason: SDUPTHER

## 2023-04-12 RX ORDER — CYCLOBENZAPRINE HCL 5 MG
TABLET ORAL
Qty: 90 TABLET | Refills: 3 | OUTPATIENT
Start: 2023-04-12

## 2023-04-14 DIAGNOSIS — Z79.4 TYPE 2 DIABETES MELLITUS WITH HYPERGLYCEMIA, WITH LONG-TERM CURRENT USE OF INSULIN: ICD-10-CM

## 2023-04-14 DIAGNOSIS — E11.65 UNCONTROLLED TYPE 2 DIABETES MELLITUS WITH HYPERGLYCEMIA, WITH LONG-TERM CURRENT USE OF INSULIN: ICD-10-CM

## 2023-04-14 DIAGNOSIS — Z79.4 UNCONTROLLED TYPE 2 DIABETES MELLITUS WITH HYPERGLYCEMIA, WITH LONG-TERM CURRENT USE OF INSULIN: ICD-10-CM

## 2023-04-14 DIAGNOSIS — E11.65 TYPE 2 DIABETES MELLITUS WITH HYPERGLYCEMIA, WITH LONG-TERM CURRENT USE OF INSULIN: ICD-10-CM

## 2023-04-14 RX ORDER — INSULIN ASPART 100 [IU]/ML
INJECTION, SOLUTION INTRAVENOUS; SUBCUTANEOUS
Qty: 30 ML | Refills: 5 | Status: SHIPPED | OUTPATIENT
Start: 2023-04-14

## 2023-04-20 DIAGNOSIS — E11.65 UNCONTROLLED TYPE 2 DIABETES MELLITUS WITH HYPERGLYCEMIA, WITH LONG-TERM CURRENT USE OF INSULIN: ICD-10-CM

## 2023-04-20 DIAGNOSIS — Z79.4 UNCONTROLLED TYPE 2 DIABETES MELLITUS WITH HYPERGLYCEMIA, WITH LONG-TERM CURRENT USE OF INSULIN: ICD-10-CM

## 2023-04-20 RX ORDER — FLURBIPROFEN SODIUM 0.3 MG/ML
SOLUTION/ DROPS OPHTHALMIC
Qty: 200 EACH | Refills: 11 | Status: SHIPPED | OUTPATIENT
Start: 2023-04-20

## 2023-04-27 ENCOUNTER — OFFICE VISIT (OUTPATIENT)
Dept: INTERNAL MEDICINE | Facility: CLINIC | Age: 68
End: 2023-04-27
Payer: MEDICARE

## 2023-04-27 VITALS
WEIGHT: 180.12 LBS | SYSTOLIC BLOOD PRESSURE: 119 MMHG | BODY MASS INDEX: 33.15 KG/M2 | HEIGHT: 62 IN | DIASTOLIC BLOOD PRESSURE: 67 MMHG | RESPIRATION RATE: 16 BRPM | TEMPERATURE: 97.5 F | OXYGEN SATURATION: 96 % | HEART RATE: 75 BPM

## 2023-04-27 DIAGNOSIS — E11.65 TYPE 2 DIABETES MELLITUS WITH HYPERGLYCEMIA, WITH LONG-TERM CURRENT USE OF INSULIN: Primary | ICD-10-CM

## 2023-04-27 DIAGNOSIS — Z78.0 POST-MENOPAUSAL: ICD-10-CM

## 2023-04-27 DIAGNOSIS — Z79.4 TYPE 2 DIABETES MELLITUS WITH HYPERGLYCEMIA, WITH LONG-TERM CURRENT USE OF INSULIN: Primary | ICD-10-CM

## 2023-04-27 LAB
EXPIRATION DATE: NORMAL
HBA1C MFR BLD: 6.8 %
Lab: NORMAL
POC CREATININE URINE: 200
POC MICROALBUMIN URINE: 150

## 2023-04-27 NOTE — PROGRESS NOTES
Chief Complaint / Reason:      Chief Complaint   Patient presents with   • Diabetes     Wanting a DEXA scan        Subjective     Diabetes  She presents for her follow-up diabetic visit. She has type 2 diabetes mellitus.     Answers for HPI/ROS submitted by the patient on 4/20/2023  Please describe your symptoms.: Appt requested bone density test???  Have you had these symptoms before?: No  How long have you been having these symptoms?: Greater than 2 weeks  Please list any medications you are currently taking for this condition.: Alendronate 70 mg  Please describe any probable cause for these symptoms. : Bone lost  What is the primary reason for your visit?: Other      History taken from: patient    PMH/FH/Social History were reviewed and updated appropriately in the electronic medical record.   Past Medical History:   Diagnosis Date   • Abdominal bloating    • Abdominal pain    • Allergic 1957    Penicillin   • Arthritis     Knees hips   • BMI 34.0-34.9,adult    • Cataract     Mini cataracts   • Diabetes mellitus    • Diarrhea    • Diverticulosis     Noted in colon test   • Encounter for long-term (current) use of medications    • Fatigue    • Fibromyalgia, primary 2011    Entire body   • Hyperlipidemia    • Hypertension    • Low back pain     Detiorated disk   • Marked eosinophilia    • Myalgia and myositis    • Nausea    • Obesity     Weight at 235 at one time   • Osteopenia     Noted in test   • Pneumonia     Have had one occurrance of pneumonia   • Reaction to chronic stress    • RUQ abdominal pain    • Urinary tract infection     Uti have had many   • Viral gastroenteritis      Past Surgical History:   Procedure Laterality Date   • APPENDECTOMY      Taken out   • BILATERAL OOPHORECTOMY     • BLADDER SURGERY      Bladder tack : Mckay Marzetti procedure   • BREAST BIOPSY     • COLONOSCOPY      march 2015   • EYE SURGERY      To straighten pulled eyes   • HYSTERECTOMY     • OOPHORECTOMY       Social History      Socioeconomic History   • Marital status:    Tobacco Use   • Smoking status: Never   • Smokeless tobacco: Never   Vaping Use   • Vaping Use: Never used   Substance and Sexual Activity   • Alcohol use: No   • Drug use: No   • Sexual activity: Yes     Partners: Male     Comment: Hysterectomy     Family History   Problem Relation Age of Onset   • Arthritis Mother    • Hypertension Mother    • Stroke Mother         small stroke   • Hyperlipidemia Mother    • Cancer Father         lung cancer   • Lung cancer Father    • No Known Problems Sister    • No Known Problems Sister    • Lymphoma Maternal Uncle    • Cancer Maternal Uncle    • Lung cancer Maternal Uncle    • Cancer Paternal Uncle    • Lung cancer Paternal Uncle    • Breast cancer Neg Hx        Review of Systems:   Review of Systems      All other systems were reviewed and are negative.  Exceptions are noted in the subjective or above.      Objective     Vital Signs  Vitals:    04/27/23 1336   BP: 119/67   Pulse: 75   Resp: 16   Temp: 97.5 °F (36.4 °C)   SpO2: 96%       Body mass index is 32.94 kg/m².    Physical Exam         Results Review:    I reviewed the patient's new clinical results.     Office Visit on 04/27/2023   Component Date Value Ref Range Status   • Microalbumin, Urine 04/27/2023 150   Final   • Creatinine, Urine 04/27/2023 200   Final   • Hemoglobin A1C 04/27/2023 6.8  % Final   • Lot Number 04/27/2023 [id number]   Final   • Expiration Date 04/27/2023 11/14/24   Final     Lab Results   Component Value Date    HGBA1C 6.8 04/27/2023    HGBA1C 7.50 (H) 09/17/2022    HGBA1C 6.9 10/28/2021         Medication Review:     Current Outpatient Medications:   •  Alcohol Swabs (B-D SINGLE USE SWABS REGULAR) pads, Use 3 times a day with insulin injections., Disp: 300 each, Rfl: 3  •  alendronate (FOSAMAX) 70 MG tablet, , Disp: , Rfl:   •  aspirin 81 MG EC tablet, Take 1 tablet by mouth Daily., Disp: , Rfl:   •  Blood Glucose Monitoring Suppl w/Device  kit, Check tid E11.9 wants accu-check guide meter, Disp: 1 each, Rfl: 0  •  Continuous Blood Gluc  (FreeStyle James 2 [place]) device, 1 Device Every 14 (Fourteen) Days., Disp: 2 each, Rfl: 6  •  Continuous Blood Gluc Sensor (FreeStyle James 2 Sensor) misc, 1 Device Every 14 (Fourteen) Days., Disp: 2 each, Rfl: 4  •  Diclofenac Sodium (VOLTAREN) 1 % gel gel, APPLY 2 GRAMS TO JOINTS OF UPPER EXTREMITY AND 4 GRAMS TO JOINTS OF LOWER EXTREMITY FOUR TIMES DAILY, Disp: , Rfl:   •  DULoxetine (CYMBALTA) 60 MG capsule, , Disp: , Rfl:   •  empagliflozin (Jardiance) 10 MG tablet tablet, Take 1 tablet by mouth Daily., Disp: 90 tablet, Rfl: 3  •  glucose blood (Accu-Chek Dinora Plus) test strip, Use tid E11.9 DMII, Disp: 300 each, Rfl: 3  •   MG tablet, TAKE 1 TABLET BY MOUTH EVERY 8 (EIGHT) HOURS AS NEEDED FOR MODERATE PAIN ., Disp: 30 tablet, Rfl: 5  •  insulin aspart (NovoLOG FlexPen) 100 UNIT/ML solution pen-injector sc pen, Patient uses 20 units with each meal. 20 units 4 times daily., Disp: 30 mL, Rfl: 5  •  Insulin Pen Needle (B-D UF III MINI PEN NEEDLES) 31G X 5 MM misc, USE ONE PEN NEEDLE TO GIVE INSULIN SHOTS FOUR TIMES A DAY, Disp: 200 each, Rfl: 11  •  Lancets misc, Check three times daily, Disp: 200 each, Rfl: 2  •  losartan-hydrochlorothiazide (HYZAAR) 100-25 MG per tablet, TAKE 1 TABLET EVERY DAY, Disp: 90 tablet, Rfl: 3  •  meloxicam (MOBIC) 15 MG tablet, Take 1 tablet by mouth Daily., Disp: 90 tablet, Rfl: 3  •  metoprolol succinate XL (TOPROL-XL) 25 MG 24 hr tablet, TAKE 1 TABLET EVERY DAY, Disp: 90 tablet, Rfl: 3  •  Mounjaro 5 MG/0.5ML solution pen-injector, INJECT 5 MG UNDER THE SKIN ONCE WEEKLY, Disp: 2 mL, Rfl: 2  •  pregabalin (LYRICA) 150 MG capsule, TAKE 1 CAPSULE TWICE DAILY, Disp: 180 capsule, Rfl: 1    Diagnoses and all orders for this visit:    Type 2 diabetes mellitus with hyperglycemia, with long-term current use of insulin  -     POCT microalbumin  -     POC Glycosylated Hemoglobin  (Hb A1C)    Post-menopausal  -     DEXA Bone Density Axial          Return in about 3 months (around 7/27/2023), or if symptoms worsen or fail to improve.    Carolina Soto, APRN  04/27/2023

## 2023-05-11 ENCOUNTER — APPOINTMENT (OUTPATIENT)
Dept: BONE DENSITY | Facility: HOSPITAL | Age: 68
End: 2023-05-11
Payer: MEDICARE

## 2023-05-11 PROCEDURE — 77080 DXA BONE DENSITY AXIAL: CPT

## 2023-05-16 RX ORDER — EMPAGLIFLOZIN 10 MG/1
TABLET, FILM COATED ORAL
Qty: 90 TABLET | Refills: 3 | Status: SHIPPED | OUTPATIENT
Start: 2023-05-16

## 2023-05-24 ENCOUNTER — OFFICE VISIT (OUTPATIENT)
Dept: INTERNAL MEDICINE | Facility: CLINIC | Age: 68
End: 2023-05-24
Payer: MEDICARE

## 2023-05-24 VITALS
HEIGHT: 62 IN | DIASTOLIC BLOOD PRESSURE: 68 MMHG | BODY MASS INDEX: 33.13 KG/M2 | SYSTOLIC BLOOD PRESSURE: 105 MMHG | RESPIRATION RATE: 18 BRPM | OXYGEN SATURATION: 99 % | WEIGHT: 180 LBS | TEMPERATURE: 97 F | HEART RATE: 84 BPM

## 2023-05-24 DIAGNOSIS — I10 BENIGN ESSENTIAL HYPERTENSION: ICD-10-CM

## 2023-05-24 DIAGNOSIS — E11.65 TYPE 2 DIABETES MELLITUS WITH HYPERGLYCEMIA, WITH LONG-TERM CURRENT USE OF INSULIN: ICD-10-CM

## 2023-05-24 DIAGNOSIS — Z01.818 PREOPERATIVE CLEARANCE: Primary | ICD-10-CM

## 2023-05-24 DIAGNOSIS — Z79.4 TYPE 2 DIABETES MELLITUS WITH HYPERGLYCEMIA, WITH LONG-TERM CURRENT USE OF INSULIN: ICD-10-CM

## 2023-05-24 DIAGNOSIS — L98.7 EXCESS SKIN OF ABDOMEN: ICD-10-CM

## 2023-05-24 PROCEDURE — 3074F SYST BP LT 130 MM HG: CPT | Performed by: NURSE PRACTITIONER

## 2023-05-24 PROCEDURE — 3078F DIAST BP <80 MM HG: CPT | Performed by: NURSE PRACTITIONER

## 2023-05-24 PROCEDURE — 3044F HG A1C LEVEL LT 7.0%: CPT | Performed by: NURSE PRACTITIONER

## 2023-05-24 PROCEDURE — 1160F RVW MEDS BY RX/DR IN RCRD: CPT | Performed by: NURSE PRACTITIONER

## 2023-05-24 PROCEDURE — 1159F MED LIST DOCD IN RCRD: CPT | Performed by: NURSE PRACTITIONER

## 2023-05-24 PROCEDURE — 99214 OFFICE O/P EST MOD 30 MIN: CPT | Performed by: NURSE PRACTITIONER

## 2023-05-24 NOTE — PROGRESS NOTES
Subjective   Mercy Clemens is a 67 y.o. female who presents to the office today for a preoperative consultation at the request of surgeon at [facility] who plans on performing cosmetic surgery on June 2, 2023. This consultation is requested for the specific conditions prompting preoperative evaluation (i.e. because of potential affect on operative risk):. Planned anesthesia: Per surgical team. The patient has the following known anesthesia issues: None. Patients bleeding risk: no recent abnormal bleeding. Patient does not have objections to receiving blood products if needed.    Mercy Clemens can walk up two flights of stairs or two blocks.     The following portions of the patient's history were reviewed and updated as appropriate: allergies, current medications, past family history, past medical history, past social history, past surgical history and problem list.    Past Medical History:   Diagnosis Date   • Abdominal bloating    • Abdominal pain    • Allergic 1957    Penicillin   • Arthritis     Knees hips   • BMI 34.0-34.9,adult    • Cataract     Mini cataracts   • Diabetes mellitus    • Diarrhea    • Diverticulosis     Noted in colon test   • Encounter for long-term (current) use of medications    • Fatigue    • Fibromyalgia, primary 2011    Entire body   • Hyperlipidemia    • Hypertension    • Low back pain     Detiorated disk   • Marked eosinophilia    • Myalgia and myositis    • Nausea    • Obesity     Weight at 235 at one time   • Osteopenia     Noted in test   • Pneumonia     Have had one occurrance of pneumonia   • Reaction to chronic stress    • RUQ abdominal pain    • Urinary tract infection     Uti have had many   • Viral gastroenteritis        Past Surgical History:   Procedure Laterality Date   • APPENDECTOMY      Taken out   • BILATERAL OOPHORECTOMY     • BLADDER SURGERY      Bladder tack : Mckay Marzetti procedure   • BREAST BIOPSY     • COLONOSCOPY      march 2015   • EYE SURGERY      To  straighten pulled eyes   • HYSTERECTOMY     • OOPHORECTOMY         Social History     Socioeconomic History   • Marital status:    Tobacco Use   • Smoking status: Never   • Smokeless tobacco: Never   Vaping Use   • Vaping Use: Never used   Substance and Sexual Activity   • Alcohol use: No   • Drug use: No   • Sexual activity: Yes     Partners: Male     Comment: Hysterectomy       Review of Systems  Review of Systems        Objective   Physical Exam  Vitals and nursing note reviewed.   Constitutional:       General: She is not in acute distress.     Appearance: Normal appearance. She is well-developed. She is obese.   HENT:      Head: Normocephalic and atraumatic.      Right Ear: Hearing, tympanic membrane, ear canal and external ear normal.      Left Ear: Hearing, tympanic membrane, ear canal and external ear normal.      Nose: Nose normal. No rhinorrhea.      Right Sinus: No maxillary sinus tenderness or frontal sinus tenderness.      Left Sinus: No maxillary sinus tenderness or frontal sinus tenderness.      Mouth/Throat:      Mouth: Mucous membranes are dry.      Dentition: Normal dentition.      Pharynx: Posterior oropharyngeal erythema present.      Comments: PND    Eyes:      Conjunctiva/sclera: Conjunctivae normal.      Pupils: Pupils are equal, round, and reactive to light.   Neck:      Thyroid: No thyroid mass or thyromegaly.      Vascular: No carotid bruit or JVD.   Cardiovascular:      Rate and Rhythm: Normal rate and regular rhythm.      Pulses: Normal pulses.      Heart sounds: Normal heart sounds, S1 normal and S2 normal. No murmur heard.  Pulmonary:      Effort: Pulmonary effort is normal. No respiratory distress.      Breath sounds: Normal breath sounds.   Abdominal:      General: Bowel sounds are normal. There is no distension or abdominal bruit.      Palpations: Abdomen is soft. There is no mass.      Tenderness: There is no abdominal tenderness. There is no right CVA tenderness, left CVA  tenderness, guarding or rebound.      Hernia: No hernia is present.   Musculoskeletal:         General: Normal range of motion.      Cervical back: Normal range of motion and neck supple.   Lymphadenopathy:      Head:      Right side of head: No submental, submandibular or tonsillar adenopathy.      Left side of head: No submental, submandibular or tonsillar adenopathy.      Cervical: No cervical adenopathy.   Skin:     General: Skin is warm and dry.      Capillary Refill: Capillary refill takes less than 2 seconds.      Findings: No rash.      Nails: There is no clubbing.   Neurological:      Mental Status: She is alert and oriented to person, place, and time.      Cranial Nerves: No cranial nerve deficit.      Sensory: No sensory deficit.      Gait: Gait normal.   Psychiatric:         Behavior: Behavior normal.         Thought Content: Thought content normal.         Judgment: Judgment normal.             Predictors of intubation difficulty:   Morbid obesity? BMi 32.9     Prominent incisors? no   Receding mandible? no   Short, thick neck? no   Neck range of motion: normal       Mouth opening: normal    Dentition: No chipped, loose, or missing teeth.    Cardiographics  ECG: NA      Imaging  Chest x-ray: not done     Lab Review   Office Visit on 04/27/2023   Component Date Value   • Microalbumin, Urine 04/27/2023 150    • Creatinine, Urine 04/27/2023 200    • Hemoglobin A1C 04/27/2023 6.8    • Lot Number 04/27/2023 [id number]    • Expiration Date 04/27/2023 11/14/24        Assessment & Plan   67 y.o. female with planned surgery as above.    Known risk factors for perioperative complications: Diabetes mellitus    Difficulty with intubation is not anticipated.    Cardiac Risk Estimation: mild    Current medications which may produce withdrawal symptoms if withheld perioperatively: none    1. Preoperative workup as follows CBC and CMP.  2. Change in medication regimen before surgery: none, continue medication regimen  including morning of surgery, with sip of water.  3. Prophylaxis for cardiac events with perioperative beta-blockers: per surgical team.  4. Invasive hemodynamic monitoring perioperatively: at the discretion of anesthesiologist.  5. Deep vein thrombosis prophylaxis postoperatively:regimen to be chosen by surgical team.  6. Surveillance for postoperative MI with ECG immediately postoperatively and on postoperative days 1 and 2 AND troponin levels 24 hours postoperatively and on day 4 or hospital discharge (whichever comes first): at the discretion of anesthesiologist.  7. Other measures: DVT pneumonia and infection prevention discussed      Surgical clearance granted from primary care standpoint.Advised patient to avoid NSAIDs or blood thinners.  Labs ordered

## 2023-05-27 ENCOUNTER — LAB (OUTPATIENT)
Dept: LAB | Facility: HOSPITAL | Age: 68
End: 2023-05-27

## 2023-05-27 LAB
ALBUMIN SERPL-MCNC: 4.3 G/DL (ref 3.5–5.2)
ALBUMIN/GLOB SERPL: 2 G/DL
ALP SERPL-CCNC: 40 U/L (ref 39–117)
ALT SERPL W P-5'-P-CCNC: 21 U/L (ref 1–33)
ANION GAP SERPL CALCULATED.3IONS-SCNC: 9.6 MMOL/L (ref 5–15)
AST SERPL-CCNC: 16 U/L (ref 1–32)
BASOPHILS # BLD AUTO: 0.06 10*3/MM3 (ref 0–0.2)
BASOPHILS NFR BLD AUTO: 0.7 % (ref 0–1.5)
BILIRUB SERPL-MCNC: 0.7 MG/DL (ref 0–1.2)
BUN SERPL-MCNC: 24 MG/DL (ref 8–23)
BUN/CREAT SERPL: 22.9 (ref 7–25)
CALCIUM SPEC-SCNC: 9.7 MG/DL (ref 8.6–10.5)
CHLORIDE SERPL-SCNC: 103 MMOL/L (ref 98–107)
CO2 SERPL-SCNC: 27.4 MMOL/L (ref 22–29)
CREAT SERPL-MCNC: 1.05 MG/DL (ref 0.57–1)
DEPRECATED RDW RBC AUTO: 40.6 FL (ref 37–54)
EGFRCR SERPLBLD CKD-EPI 2021: 58.7 ML/MIN/1.73
EOSINOPHIL # BLD AUTO: 1.23 10*3/MM3 (ref 0–0.4)
EOSINOPHIL NFR BLD AUTO: 13.7 % (ref 0.3–6.2)
ERYTHROCYTE [DISTWIDTH] IN BLOOD BY AUTOMATED COUNT: 12.4 % (ref 12.3–15.4)
GLOBULIN UR ELPH-MCNC: 2.2 GM/DL
GLUCOSE SERPL-MCNC: 152 MG/DL (ref 65–99)
HCT VFR BLD AUTO: 46.6 % (ref 34–46.6)
HGB BLD-MCNC: 16.5 G/DL (ref 12–15.9)
IMM GRANULOCYTES # BLD AUTO: 0.07 10*3/MM3 (ref 0–0.05)
IMM GRANULOCYTES NFR BLD AUTO: 0.8 % (ref 0–0.5)
LYMPHOCYTES # BLD AUTO: 1.93 10*3/MM3 (ref 0.7–3.1)
LYMPHOCYTES NFR BLD AUTO: 21.5 % (ref 19.6–45.3)
MCH RBC QN AUTO: 32 PG (ref 26.6–33)
MCHC RBC AUTO-ENTMCNC: 35.4 G/DL (ref 31.5–35.7)
MCV RBC AUTO: 90.3 FL (ref 79–97)
MONOCYTES # BLD AUTO: 0.67 10*3/MM3 (ref 0.1–0.9)
MONOCYTES NFR BLD AUTO: 7.5 % (ref 5–12)
NEUTROPHILS NFR BLD AUTO: 5.01 10*3/MM3 (ref 1.7–7)
NEUTROPHILS NFR BLD AUTO: 55.8 % (ref 42.7–76)
NRBC BLD AUTO-RTO: 0 /100 WBC (ref 0–0.2)
PLATELET # BLD AUTO: 234 10*3/MM3 (ref 140–450)
PMV BLD AUTO: 10.7 FL (ref 6–12)
POTASSIUM SERPL-SCNC: 4.2 MMOL/L (ref 3.5–5.2)
PROT SERPL-MCNC: 6.5 G/DL (ref 6–8.5)
RBC # BLD AUTO: 5.16 10*6/MM3 (ref 3.77–5.28)
SODIUM SERPL-SCNC: 140 MMOL/L (ref 136–145)
WBC NRBC COR # BLD: 8.97 10*3/MM3 (ref 3.4–10.8)

## 2023-05-27 PROCEDURE — 80053 COMPREHEN METABOLIC PANEL: CPT | Performed by: NURSE PRACTITIONER

## 2023-05-27 PROCEDURE — 85025 COMPLETE CBC W/AUTO DIFF WBC: CPT | Performed by: NURSE PRACTITIONER

## 2023-05-27 PROCEDURE — 36415 COLL VENOUS BLD VENIPUNCTURE: CPT | Performed by: NURSE PRACTITIONER

## 2023-09-18 ENCOUNTER — OFFICE VISIT (OUTPATIENT)
Dept: INTERNAL MEDICINE | Facility: CLINIC | Age: 68
End: 2023-09-18
Payer: MEDICARE

## 2023-09-18 VITALS
DIASTOLIC BLOOD PRESSURE: 67 MMHG | SYSTOLIC BLOOD PRESSURE: 105 MMHG | HEART RATE: 77 BPM | RESPIRATION RATE: 18 BRPM | BODY MASS INDEX: 33.31 KG/M2 | WEIGHT: 181 LBS | TEMPERATURE: 97.1 F | OXYGEN SATURATION: 96 % | HEIGHT: 62 IN

## 2023-09-18 DIAGNOSIS — Z79.4 UNCONTROLLED TYPE 2 DIABETES MELLITUS WITH HYPERGLYCEMIA, WITH LONG-TERM CURRENT USE OF INSULIN: ICD-10-CM

## 2023-09-18 DIAGNOSIS — E78.2 MIXED HYPERLIPIDEMIA: ICD-10-CM

## 2023-09-18 DIAGNOSIS — E11.65 UNCONTROLLED TYPE 2 DIABETES MELLITUS WITH HYPERGLYCEMIA, WITH LONG-TERM CURRENT USE OF INSULIN: ICD-10-CM

## 2023-09-18 DIAGNOSIS — R53.83 FATIGUE, UNSPECIFIED TYPE: ICD-10-CM

## 2023-09-18 DIAGNOSIS — I10 BENIGN ESSENTIAL HYPERTENSION: ICD-10-CM

## 2023-09-18 DIAGNOSIS — Z00.00 MEDICARE ANNUAL WELLNESS VISIT, SUBSEQUENT: Primary | ICD-10-CM

## 2023-09-18 PROCEDURE — G0439 PPPS, SUBSEQ VISIT: HCPCS | Performed by: NURSE PRACTITIONER

## 2023-09-18 PROCEDURE — 1160F RVW MEDS BY RX/DR IN RCRD: CPT | Performed by: NURSE PRACTITIONER

## 2023-09-18 PROCEDURE — 99397 PER PM REEVAL EST PAT 65+ YR: CPT | Performed by: NURSE PRACTITIONER

## 2023-09-18 PROCEDURE — 3044F HG A1C LEVEL LT 7.0%: CPT | Performed by: NURSE PRACTITIONER

## 2023-09-18 PROCEDURE — 1170F FXNL STATUS ASSESSED: CPT | Performed by: NURSE PRACTITIONER

## 2023-09-18 PROCEDURE — 3074F SYST BP LT 130 MM HG: CPT | Performed by: NURSE PRACTITIONER

## 2023-09-18 PROCEDURE — 1159F MED LIST DOCD IN RCRD: CPT | Performed by: NURSE PRACTITIONER

## 2023-09-18 PROCEDURE — 96160 PT-FOCUSED HLTH RISK ASSMT: CPT | Performed by: NURSE PRACTITIONER

## 2023-09-18 PROCEDURE — 3078F DIAST BP <80 MM HG: CPT | Performed by: NURSE PRACTITIONER

## 2023-09-18 NOTE — PROGRESS NOTES
The ABCs of the Annual Wellness Visit  Subsequent Medicare Wellness Visit    Chief Complaint   Patient presents with    Medicare Wellness-subsequent     Living will- no, no info wanted        Subjective   History of Present Illness:  Mercy Clemens is a 68 y.o. female who presents for a Subsequent Medicare Wellness Visit and annual physical examination.UTD on vision and dental and will be scheduling appt for dermatology     HEALTH RISK ASSESSMENT    Recent Hospitalizations:  No hospitalization(s) within the last year.    Current Medical Providers:  Patient Care Team:  Carolina Soto APRN as PCP - General (Family Medicine)    Smoking Status:  Social History     Tobacco Use   Smoking Status Never   Smokeless Tobacco Never       Alcohol Consumption:  Social History     Substance and Sexual Activity   Alcohol Use No       Depression Screen:       9/18/2023    11:00 AM   PHQ-2/PHQ-9 Depression Screening   Little Interest or Pleasure in Doing Things 2-->more than half the days   Feeling Down, Depressed or Hopeless 2-->more than half the days   Trouble Falling or Staying Asleep, or Sleeping Too Much 3-->nearly every day   Feeling Tired or Having Little Energy 1-->several days   Poor Appetite or Overeating 1-->several days   Feeling Bad about Yourself - or that You are a Failure or Have Let Yourself or Your Family Down 0-->not at all   Trouble Concentrating on Things, Such as Reading the Newspaper or Watching Television 0-->not at all   Moving or Speaking So Slowly that Other People Could Have Noticed? Or the Opposite - Being So Fidgety 0-->not at all   Thoughts that You Would be Better Off Dead or of Hurting Yourself in Some Way 0-->not at all   PHQ-9: Brief Depression Severity Measure Score 9   If You Checked Off Any Problems, How Difficult Have These Problems Made It For You to Do Your Work, Take Care of Things at Home, or Get Along with Other People? somewhat difficult       Fall Risk Screen:  STEADI Fall Risk  Assessment was completed, and patient is at LOW risk for falls.Assessment completed on:2023    Health Habits and Functional and Cognitive Screenin/18/2023    10:38 AM   Functional & Cognitive Status   Do you have difficulty preparing food and eating? No   Do you have difficulty bathing yourself, getting dressed or grooming yourself? No   Do you have difficulty using the toilet? No   Do you have difficulty moving around from place to place? No   Do you have trouble with steps or getting out of a bed or a chair? No   Current Diet Well Balanced Diet   Dental Exam Up to date   Eye Exam Up to date   Exercise (times per week) 0 times per week   Current Exercises Include No Regular Exercise   Do you need help using the phone?  No   Are you deaf or do you have serious difficulty hearing?  No   Do you need help to go to places out of walking distance? No   Do you need help shopping? No   Do you need help preparing meals?  No   Do you need help with housework?  No   Do you need help with laundry? No   Do you need help taking your medications? No   Do you need help managing money? No   Do you ever drive or ride in a car without wearing a seat belt? No   Have you felt unusual stress, anger or loneliness in the last month? Yes   Who do you live with? Spouse   If you need help, do you have trouble finding someone available to you? No   Have you been bothered in the last four weeks by sexual problems? No   Do you have difficulty concentrating, remembering or making decisions? No         Does the patient have evidence of cognitive impairment? No    Asprin use counseling:Does not need ASA (and currently is not on it)    Age-appropriate Screening Schedule:  Refer to the list below for future screening recommendations based on patient's age, sex and/or medical conditions. Orders for these recommended tests are listed in the plan section. The patient has been provided with a written plan.    Health Maintenance   Topic  Date Due    DIABETIC FOOT EXAM  06/05/2020    DIABETIC EYE EXAM  03/18/2023    BMI FOLLOWUP  09/16/2023    LIPID PANEL  09/17/2023    ZOSTER VACCINE (1 of 2) 09/08/2024 (Originally 6/23/2006)    COVID-19 Vaccine (3 - Booster for Gustavo series) 12/11/2024 (Originally 1/17/2022)    INFLUENZA VACCINE  10/01/2023    HEMOGLOBIN A1C  10/27/2023    MAMMOGRAM  02/28/2024    URINE MICROALBUMIN  04/27/2024    ANNUAL WELLNESS VISIT  09/18/2024    COLORECTAL CANCER SCREENING  02/10/2025    DXA SCAN  05/11/2025    TDAP/TD VACCINES (2 - Td or Tdap) 09/21/2030    Pneumococcal Vaccine 65+  Completed    HEPATITIS C SCREENING  Discontinued    PAP SMEAR  Discontinued          The following portions of the patient's history were reviewed and updated as appropriate: allergies, current medications, past family history, past medical history, past social history, past surgical history, and problem list.    Outpatient Medications Prior to Visit   Medication Sig Dispense Refill    Alcohol Swabs (B-D SINGLE USE SWABS REGULAR) pads Use 3 times a day with insulin injections. 300 each 3    alendronate (FOSAMAX) 70 MG tablet       Blood Glucose Monitoring Suppl w/Device kit Check tid E11.9 wants accu-check guide meter 1 each 0    Continuous Blood Gluc  (FreeStyle James 2 [place]) device 1 Device Every 14 (Fourteen) Days. 2 each 6    Continuous Blood Gluc Sensor (FreeStyle James 2 Sensor) misc 1 Device Every 14 (Fourteen) Days. 2 each 4    Diclofenac Sodium (VOLTAREN) 1 % gel gel APPLY 2 GRAMS TO JOINTS OF UPPER EXTREMITY AND 4 GRAMS TO JOINTS OF LOWER EXTREMITY FOUR TIMES DAILY      DULoxetine (CYMBALTA) 60 MG capsule       empagliflozin (Jardiance) 10 MG tablet tablet Take 1 tablet by mouth Daily. 90 tablet 3    glucose blood (Accu-Chek Dinora Plus) test strip Use tid E11.9 DMII 300 each 3     MG tablet TAKE 1 TABLET BY MOUTH EVERY 8 (EIGHT) HOURS AS NEEDED FOR MODERATE PAIN . 30 tablet 5    insulin aspart (NovoLOG FlexPen) 100  UNIT/ML solution pen-injector sc pen Patient uses 20 units with each meal. 20 units 4 times daily. 30 mL 5    Insulin Pen Needle (B-D UF III MINI PEN NEEDLES) 31G X 5 MM misc USE ONE PEN NEEDLE TO GIVE INSULIN SHOTS FOUR TIMES A  each 11    Jardiance 10 MG tablet tablet TAKE 1 TABLET EVERY DAY 90 tablet 3    Lancets misc Check three times daily 200 each 2    losartan-hydrochlorothiazide (HYZAAR) 100-25 MG per tablet TAKE 1 TABLET EVERY DAY 90 tablet 3    meloxicam (MOBIC) 15 MG tablet Take 1 tablet by mouth Daily. 90 tablet 3    metoprolol succinate XL (TOPROL-XL) 25 MG 24 hr tablet TAKE 1 TABLET EVERY DAY 90 tablet 3    Mounjaro 5 MG/0.5ML solution pen-injector INJECT 5 MG UNDER THE SKIN ONCE WEEKLY 2 mL 2    pregabalin (LYRICA) 150 MG capsule TAKE 1 CAPSULE TWICE DAILY 180 capsule 1    aspirin 81 MG EC tablet Take 1 tablet by mouth Daily.       No facility-administered medications prior to visit.       Patient Active Problem List   Diagnosis    Hyperlipidemia    Diverticulosis of large intestine    Benign essential hypertension    Eosinophilic leukocytosis    Obstructive sleep apnea syndrome    Uncontrolled type 2 diabetes mellitus without complication, with long-term current use of insulin    Vitamin D deficiency    Fibromyalgia    Premature surgical menopause    Diabetic neuropathy, painful    Primary insomnia    Primary osteoarthritis involving multiple joints       Advanced Care Planning:  ACP discussion was declined by the patient. Patient does not have an advance directive, declines further assistance.    Review of Systems    Compared to one year ago, the patient feels her physical health is better.  Compared to one year ago, the patient feels her mental health is better.    Reviewed chart for potential of high risk medication in the elderly: yes  Reviewed chart for potential of harmful drug interactions in the elderly:yes    Objective         Vitals:    09/18/23 1037   BP: 105/67   Pulse: 77   Resp:  "18   Temp: 97.1 °F (36.2 °C)   TempSrc: Temporal   SpO2: 96%   Weight: 82.1 kg (181 lb)   Height: 157.5 cm (62\")   PainSc: 0-No pain       Body mass index is 33.11 kg/m².  Discussed the patient's BMI with her. The BMI is above average; BMI management plan is completed.    Physical Exam  Vitals and nursing note reviewed.   Constitutional:       General: She is not in acute distress.     Appearance: Normal appearance. She is well-developed. She is obese.   HENT:      Head: Normocephalic and atraumatic.      Right Ear: Hearing, tympanic membrane, ear canal and external ear normal.      Left Ear: Hearing, tympanic membrane, ear canal and external ear normal.      Nose: Nose normal. No rhinorrhea.      Right Sinus: No maxillary sinus tenderness or frontal sinus tenderness.      Left Sinus: No maxillary sinus tenderness or frontal sinus tenderness.      Mouth/Throat:      Mouth: Mucous membranes are dry.      Dentition: Normal dentition.      Pharynx: Posterior oropharyngeal erythema present.      Comments: PND    Eyes:      Conjunctiva/sclera: Conjunctivae normal.      Pupils: Pupils are equal, round, and reactive to light.   Neck:      Thyroid: No thyroid mass or thyromegaly.      Vascular: No carotid bruit or JVD.   Cardiovascular:      Rate and Rhythm: Normal rate and regular rhythm.      Pulses: Normal pulses.      Heart sounds: Normal heart sounds, S1 normal and S2 normal. No murmur heard.  Pulmonary:      Effort: Pulmonary effort is normal. No respiratory distress.      Breath sounds: Normal breath sounds.   Abdominal:      General: Bowel sounds are normal. There is no distension or abdominal bruit.      Palpations: Abdomen is soft. There is no mass.      Tenderness: There is no abdominal tenderness. There is no right CVA tenderness, left CVA tenderness, guarding or rebound.      Hernia: No hernia is present.   Musculoskeletal:         General: Normal range of motion.      Cervical back: Normal range of motion and " neck supple.   Lymphadenopathy:      Head:      Right side of head: No submental, submandibular or tonsillar adenopathy.      Left side of head: No submental, submandibular or tonsillar adenopathy.      Cervical: No cervical adenopathy.   Skin:     General: Skin is warm and dry.      Capillary Refill: Capillary refill takes less than 2 seconds.      Findings: No rash.      Nails: There is no clubbing.   Neurological:      Mental Status: She is alert and oriented to person, place, and time.      Cranial Nerves: No cranial nerve deficit.      Sensory: No sensory deficit.      Gait: Gait normal.   Psychiatric:         Behavior: Behavior normal.         Thought Content: Thought content normal.         Judgment: Judgment normal.      9/18/2023   Anxiety MERYL-7    Feeling nervous, anxious or on edge 0    Not being able to stop or control worrying 3    Worrying too much about different things 1    Trouble Relaxing 2    Being so restless that it is hard to sit still 3    Becoming easily annoyed or irritable 0    Feeling afraid as if something awful might happen 0    MERYL 7 Total Score 9    If you checked any problems, how difficult have these problems made it for you to do your work, take care of things at home, or get along with other people Somewhat difficult      PHQ-9 Depression Screening  Little interest or pleasure in doing things? 2-->more than half the days   Feeling down, depressed, or hopeless? 2-->more than half the days   Trouble falling or staying asleep, or sleeping too much? 3-->nearly every day   Feeling tired or having little energy? 1-->several days   Poor appetite or overeating? 1-->several days   Feeling bad about yourself - or that you are a failure or have let yourself or your family down? 0-->not at all   Trouble concentrating on things, such as reading the newspaper or watching television? 0-->not at all   Moving or speaking so slowly that other people could have noticed? Or the opposite - being so  fidgety or restless that you have been moving around a lot more than usual? 0-->not at all   Thoughts that you would be better off dead, or of hurting yourself in some way? 0-->not at all   PHQ-9 Total Score 9   If you checked off any problems, how difficult have these problems made it for you to do your work, take care of things at home, or get along with other people? somewhat difficult             Assessment & Plan   Medicare Risks and Personalized Health Plan  CMS Preventative Services Quick Reference  Advance Directive Discussion  Breast Cancer/Mammogram Screening  Cardiovascular risk  Chronic Pain   Colon Cancer Screening  Dementia/Memory   Depression/Dysphoria  Diabetic Lab Screening   Fall Risk  Glaucoma Risk  Hearing Problem  Inadequate Social Support, Isolation, Loneliness, Lack of Transportation, Financial Difficulties, or Caregiver Stress   Inactivity/Sedentary  Obesity/Overweight   Osteoporosis Risk  Urinary Incontinence    The above risks/problems have been discussed with the patient.  Pertinent information has been shared with the patient in the After Visit Summary.  Follow up plans and orders are seen below in the Assessment/Plan Section.    Diagnoses and all orders for this visit:    1. Medicare annual wellness visit, subsequent (Primary)    2. Uncontrolled type 2 diabetes mellitus with hyperglycemia, with long-term current use of insulin  -     Hemoglobin A1c  Follow diabetic diet  Monitor blood sugars as discussed  See eye doctor annually or as discussed  Wear protective foot wear/no bare feet  Check feet regularly for calluses or ulcers  Discussed risk of poorly controlled diabetes and long-term complications  Exercise as tolerated up to 30 minutes 5 days a week  Take all medications as prescribed    3. Benign essential hypertension  -     Comprehensive Metabolic Panel  -     CBC Auto Differential  Continue current medication regimen, stable on medications without worsening s/s     4. Mixed  hyperlipidemia  -     Lipid Panel    5. Fatigue, unspecified type  -     Vitamin B12  -     TSH  -     T4  Does sleep hygiene and advised patient to eat well balanced diet     Follow Up:  Return in about 3 months (around 12/18/2023), or if symptoms worsen or fail to improve.     An After Visit Summary and PPPS were given to the patient.

## 2023-09-20 ENCOUNTER — LAB (OUTPATIENT)
Dept: LAB | Facility: HOSPITAL | Age: 68
End: 2023-09-20
Payer: MEDICARE

## 2023-09-20 LAB
ALBUMIN SERPL-MCNC: 4.2 G/DL (ref 3.5–5.2)
ALBUMIN/GLOB SERPL: 1.9 G/DL
ALP SERPL-CCNC: 43 U/L (ref 39–117)
ALT SERPL W P-5'-P-CCNC: 23 U/L (ref 1–33)
ANION GAP SERPL CALCULATED.3IONS-SCNC: 11.9 MMOL/L (ref 5–15)
AST SERPL-CCNC: 24 U/L (ref 1–32)
BASOPHILS # BLD AUTO: 0.04 10*3/MM3 (ref 0–0.2)
BASOPHILS NFR BLD AUTO: 0.6 % (ref 0–1.5)
BILIRUB SERPL-MCNC: 0.7 MG/DL (ref 0–1.2)
BUN SERPL-MCNC: 19 MG/DL (ref 8–23)
BUN/CREAT SERPL: 17.1 (ref 7–25)
CALCIUM SPEC-SCNC: 9.9 MG/DL (ref 8.6–10.5)
CHLORIDE SERPL-SCNC: 102 MMOL/L (ref 98–107)
CHOLEST SERPL-MCNC: 199 MG/DL (ref 0–200)
CO2 SERPL-SCNC: 27.1 MMOL/L (ref 22–29)
CREAT SERPL-MCNC: 1.11 MG/DL (ref 0.57–1)
DEPRECATED RDW RBC AUTO: 40.3 FL (ref 37–54)
EGFRCR SERPLBLD CKD-EPI 2021: 54.6 ML/MIN/1.73
EOSINOPHIL # BLD AUTO: 0.3 10*3/MM3 (ref 0–0.4)
EOSINOPHIL NFR BLD AUTO: 4.4 % (ref 0.3–6.2)
ERYTHROCYTE [DISTWIDTH] IN BLOOD BY AUTOMATED COUNT: 12.6 % (ref 12.3–15.4)
GLOBULIN UR ELPH-MCNC: 2.2 GM/DL
GLUCOSE SERPL-MCNC: 137 MG/DL (ref 65–99)
HBA1C MFR BLD: 6.3 % (ref 4.8–5.6)
HCT VFR BLD AUTO: 47.3 % (ref 34–46.6)
HDLC SERPL-MCNC: 48 MG/DL (ref 40–60)
HGB BLD-MCNC: 16.9 G/DL (ref 12–15.9)
IMM GRANULOCYTES # BLD AUTO: 0.02 10*3/MM3 (ref 0–0.05)
IMM GRANULOCYTES NFR BLD AUTO: 0.3 % (ref 0–0.5)
LDLC SERPL CALC-MCNC: 121 MG/DL (ref 0–100)
LDLC/HDLC SERPL: 2.45 {RATIO}
LYMPHOCYTES # BLD AUTO: 1.94 10*3/MM3 (ref 0.7–3.1)
LYMPHOCYTES NFR BLD AUTO: 28.7 % (ref 19.6–45.3)
MCH RBC QN AUTO: 32.3 PG (ref 26.6–33)
MCHC RBC AUTO-ENTMCNC: 35.7 G/DL (ref 31.5–35.7)
MCV RBC AUTO: 90.3 FL (ref 79–97)
MONOCYTES # BLD AUTO: 0.55 10*3/MM3 (ref 0.1–0.9)
MONOCYTES NFR BLD AUTO: 8.1 % (ref 5–12)
NEUTROPHILS NFR BLD AUTO: 3.9 10*3/MM3 (ref 1.7–7)
NEUTROPHILS NFR BLD AUTO: 57.9 % (ref 42.7–76)
NRBC BLD AUTO-RTO: 0 /100 WBC (ref 0–0.2)
PLATELET # BLD AUTO: 217 10*3/MM3 (ref 140–450)
PMV BLD AUTO: 11.1 FL (ref 6–12)
POTASSIUM SERPL-SCNC: 4.3 MMOL/L (ref 3.5–5.2)
PROT SERPL-MCNC: 6.4 G/DL (ref 6–8.5)
RBC # BLD AUTO: 5.24 10*6/MM3 (ref 3.77–5.28)
SODIUM SERPL-SCNC: 141 MMOL/L (ref 136–145)
T4 SERPL-MCNC: 8.81 MCG/DL (ref 4.5–11.7)
TRIGL SERPL-MCNC: 167 MG/DL (ref 0–150)
TSH SERPL DL<=0.05 MIU/L-ACNC: 0.93 UIU/ML (ref 0.27–4.2)
VIT B12 BLD-MCNC: 714 PG/ML (ref 211–946)
VLDLC SERPL-MCNC: 30 MG/DL (ref 5–40)
WBC NRBC COR # BLD: 6.75 10*3/MM3 (ref 3.4–10.8)

## 2023-09-20 PROCEDURE — 82607 VITAMIN B-12: CPT | Performed by: NURSE PRACTITIONER

## 2023-09-20 PROCEDURE — 84436 ASSAY OF TOTAL THYROXINE: CPT | Performed by: NURSE PRACTITIONER

## 2023-09-20 PROCEDURE — 84443 ASSAY THYROID STIM HORMONE: CPT | Performed by: NURSE PRACTITIONER

## 2023-09-20 PROCEDURE — 85025 COMPLETE CBC W/AUTO DIFF WBC: CPT | Performed by: NURSE PRACTITIONER

## 2023-09-20 PROCEDURE — 80061 LIPID PANEL: CPT | Performed by: NURSE PRACTITIONER

## 2023-09-20 PROCEDURE — 36415 COLL VENOUS BLD VENIPUNCTURE: CPT | Performed by: NURSE PRACTITIONER

## 2023-09-20 PROCEDURE — 83036 HEMOGLOBIN GLYCOSYLATED A1C: CPT | Performed by: NURSE PRACTITIONER

## 2023-09-20 PROCEDURE — 80053 COMPREHEN METABOLIC PANEL: CPT | Performed by: NURSE PRACTITIONER

## 2023-10-09 DIAGNOSIS — E11.65 UNCONTROLLED TYPE 2 DIABETES MELLITUS WITH HYPERGLYCEMIA, WITH LONG-TERM CURRENT USE OF INSULIN: Primary | ICD-10-CM

## 2023-10-09 DIAGNOSIS — Z79.4 UNCONTROLLED TYPE 2 DIABETES MELLITUS WITH HYPERGLYCEMIA, WITH LONG-TERM CURRENT USE OF INSULIN: Primary | ICD-10-CM

## 2023-10-09 DIAGNOSIS — R94.4 DECREASED GFR: ICD-10-CM

## 2023-10-10 DIAGNOSIS — M79.7 FIBROMYALGIA: ICD-10-CM

## 2023-10-10 DIAGNOSIS — E11.40 DIABETIC NEUROPATHY, PAINFUL: ICD-10-CM

## 2023-10-11 RX ORDER — PREGABALIN 150 MG/1
CAPSULE ORAL
Qty: 180 CAPSULE | Refills: 0 | Status: SHIPPED | OUTPATIENT
Start: 2023-10-11

## 2023-10-12 DIAGNOSIS — E11.65 UNCONTROLLED TYPE 2 DIABETES MELLITUS WITH HYPERGLYCEMIA, WITH LONG-TERM CURRENT USE OF INSULIN: ICD-10-CM

## 2023-10-12 DIAGNOSIS — Z79.4 UNCONTROLLED TYPE 2 DIABETES MELLITUS WITH HYPERGLYCEMIA, WITH LONG-TERM CURRENT USE OF INSULIN: ICD-10-CM

## 2023-10-12 RX ORDER — TIRZEPATIDE 5 MG/.5ML
INJECTION, SOLUTION SUBCUTANEOUS
Qty: 2 ML | Refills: 2 | Status: SHIPPED | OUTPATIENT
Start: 2023-10-12

## 2023-11-25 DIAGNOSIS — I10 BENIGN ESSENTIAL HYPERTENSION: ICD-10-CM

## 2023-11-27 RX ORDER — METOPROLOL SUCCINATE 25 MG/1
TABLET, EXTENDED RELEASE ORAL
Qty: 90 TABLET | Refills: 3 | Status: SHIPPED | OUTPATIENT
Start: 2023-11-27

## 2023-12-22 ENCOUNTER — LAB (OUTPATIENT)
Dept: LAB | Facility: HOSPITAL | Age: 68
End: 2023-12-22
Payer: MEDICARE

## 2023-12-22 ENCOUNTER — TRANSCRIBE ORDERS (OUTPATIENT)
Dept: LAB | Facility: HOSPITAL | Age: 68
End: 2023-12-22
Payer: MEDICARE

## 2023-12-22 DIAGNOSIS — M15.9 GENERALIZED OSTEOARTHROSIS, INVOLVING MULTIPLE SITES: ICD-10-CM

## 2023-12-22 DIAGNOSIS — I10 ESSENTIAL HYPERTENSION, MALIGNANT: ICD-10-CM

## 2023-12-22 DIAGNOSIS — E11.49 DIABETIC NEUROPATHY WITH NEUROLOGIC COMPLICATION: ICD-10-CM

## 2023-12-22 DIAGNOSIS — E11.40 DIABETIC NEUROPATHY WITH NEUROLOGIC COMPLICATION: ICD-10-CM

## 2023-12-22 DIAGNOSIS — G47.33 OBSTRUCTIVE SLEEP APNEA (ADULT) (PEDIATRIC): ICD-10-CM

## 2023-12-22 DIAGNOSIS — Z79.1 ENCOUNTER FOR LONG-TERM (CURRENT) USE OF NON-STEROIDAL ANTI-INFLAMMATORIES: ICD-10-CM

## 2023-12-22 DIAGNOSIS — E78.5 HYPERLIPIDEMIA, UNSPECIFIED HYPERLIPIDEMIA TYPE: ICD-10-CM

## 2023-12-22 DIAGNOSIS — E55.9 VITAMIN D DEFICIENCY: ICD-10-CM

## 2023-12-22 DIAGNOSIS — M79.7 SCAPULOHUMERAL FIBROSITIS: ICD-10-CM

## 2023-12-22 DIAGNOSIS — E11.9 DIABETES MELLITUS WITHOUT COMPLICATION: ICD-10-CM

## 2023-12-22 DIAGNOSIS — N18.2 CHRONIC KIDNEY DISEASE, STAGE II (MILD): ICD-10-CM

## 2023-12-22 DIAGNOSIS — N18.2 CHRONIC KIDNEY DISEASE, STAGE II (MILD): Primary | ICD-10-CM

## 2023-12-22 LAB
25(OH)D3 SERPL-MCNC: 43.5 NG/ML (ref 30–100)
ALBUMIN SERPL-MCNC: 4.1 G/DL (ref 3.5–5.2)
ALBUMIN/GLOB SERPL: 1.5 G/DL
ALP SERPL-CCNC: 41 U/L (ref 39–117)
ALT SERPL W P-5'-P-CCNC: 21 U/L (ref 1–33)
ANION GAP SERPL CALCULATED.3IONS-SCNC: 10.9 MMOL/L (ref 5–15)
AST SERPL-CCNC: 22 U/L (ref 1–32)
BACTERIA UR QL AUTO: ABNORMAL /HPF
BILIRUB SERPL-MCNC: 0.5 MG/DL (ref 0–1.2)
BILIRUB UR QL STRIP: NEGATIVE
BUN SERPL-MCNC: 14 MG/DL (ref 8–23)
BUN/CREAT SERPL: 18.2 (ref 7–25)
CALCIUM SPEC-SCNC: 9.8 MG/DL (ref 8.6–10.5)
CHLORIDE SERPL-SCNC: 106 MMOL/L (ref 98–107)
CLARITY UR: ABNORMAL
CO2 SERPL-SCNC: 27.1 MMOL/L (ref 22–29)
COLOR UR: YELLOW
CREAT SERPL-MCNC: 0.77 MG/DL (ref 0.57–1)
CREAT UR-MCNC: 41.2 MG/DL
DEPRECATED RDW RBC AUTO: 41.6 FL (ref 37–54)
EGFRCR SERPLBLD CKD-EPI 2021: 84.7 ML/MIN/1.73
ERYTHROCYTE [DISTWIDTH] IN BLOOD BY AUTOMATED COUNT: 12.9 % (ref 12.3–15.4)
GLOBULIN UR ELPH-MCNC: 2.7 GM/DL
GLUCOSE SERPL-MCNC: 114 MG/DL (ref 65–99)
GLUCOSE UR STRIP-MCNC: ABNORMAL MG/DL
HCT VFR BLD AUTO: 48.4 % (ref 34–46.6)
HGB BLD-MCNC: 17.5 G/DL (ref 12–15.9)
HGB UR QL STRIP.AUTO: NEGATIVE
HYALINE CASTS UR QL AUTO: ABNORMAL /LPF
IRON 24H UR-MRATE: 75 MCG/DL (ref 37–145)
IRON SATN MFR SERPL: 19 % (ref 20–50)
KETONES UR QL STRIP: NEGATIVE
LEUKOCYTE ESTERASE UR QL STRIP.AUTO: ABNORMAL
MAGNESIUM SERPL-MCNC: 1.9 MG/DL (ref 1.6–2.4)
MCH RBC QN AUTO: 33.1 PG (ref 26.6–33)
MCHC RBC AUTO-ENTMCNC: 36.2 G/DL (ref 31.5–35.7)
MCV RBC AUTO: 91.5 FL (ref 79–97)
NITRITE UR QL STRIP: NEGATIVE
PH UR STRIP.AUTO: 5.5 [PH] (ref 5–8)
PLATELET # BLD AUTO: 189 10*3/MM3 (ref 140–450)
PMV BLD AUTO: 10.9 FL (ref 6–12)
POTASSIUM SERPL-SCNC: 3.9 MMOL/L (ref 3.5–5.2)
PROT ?TM UR-MCNC: 5 MG/DL
PROT SERPL-MCNC: 6.8 G/DL (ref 6–8.5)
PROT UR QL STRIP: NEGATIVE
PROT/CREAT UR: 121.4 MG/G CREA (ref 0–200)
PTH-INTACT SERPL-MCNC: 25.8 PG/ML (ref 15–65)
RBC # BLD AUTO: 5.29 10*6/MM3 (ref 3.77–5.28)
RBC # UR STRIP: ABNORMAL /HPF
REF LAB TEST METHOD: ABNORMAL
SODIUM SERPL-SCNC: 144 MMOL/L (ref 136–145)
SP GR UR STRIP: 1.02 (ref 1–1.03)
SQUAMOUS #/AREA URNS HPF: ABNORMAL /HPF
TIBC SERPL-MCNC: 390 MCG/DL (ref 298–536)
TRANSFERRIN SERPL-MCNC: 262 MG/DL (ref 200–360)
URATE SERPL-MCNC: 4.4 MG/DL (ref 2.4–5.7)
UROBILINOGEN UR QL STRIP: ABNORMAL
WBC # UR STRIP: ABNORMAL /HPF
WBC NRBC COR # BLD AUTO: 7.29 10*3/MM3 (ref 3.4–10.8)

## 2023-12-22 PROCEDURE — 83540 ASSAY OF IRON: CPT

## 2023-12-22 PROCEDURE — 80053 COMPREHEN METABOLIC PANEL: CPT

## 2023-12-22 PROCEDURE — 85027 COMPLETE CBC AUTOMATED: CPT

## 2023-12-22 PROCEDURE — 83970 ASSAY OF PARATHORMONE: CPT

## 2023-12-22 PROCEDURE — 84466 ASSAY OF TRANSFERRIN: CPT

## 2023-12-22 PROCEDURE — 82306 VITAMIN D 25 HYDROXY: CPT

## 2023-12-22 PROCEDURE — 84156 ASSAY OF PROTEIN URINE: CPT

## 2023-12-22 PROCEDURE — 84550 ASSAY OF BLOOD/URIC ACID: CPT

## 2023-12-22 PROCEDURE — 82570 ASSAY OF URINE CREATININE: CPT

## 2023-12-22 PROCEDURE — 83735 ASSAY OF MAGNESIUM: CPT

## 2023-12-22 PROCEDURE — 81001 URINALYSIS AUTO W/SCOPE: CPT

## 2023-12-22 PROCEDURE — 36415 COLL VENOUS BLD VENIPUNCTURE: CPT

## 2024-01-08 ENCOUNTER — TRANSCRIBE ORDERS (OUTPATIENT)
Dept: ADMINISTRATIVE | Facility: HOSPITAL | Age: 69
End: 2024-01-08
Payer: MEDICARE

## 2024-01-08 DIAGNOSIS — N18.2 CHRONIC KIDNEY DISEASE, STAGE II (MILD): Primary | ICD-10-CM

## 2024-01-25 ENCOUNTER — HOSPITAL ENCOUNTER (OUTPATIENT)
Dept: ULTRASOUND IMAGING | Facility: HOSPITAL | Age: 69
Discharge: HOME | End: 2024-01-25
Payer: MEDICARE

## 2024-01-25 DIAGNOSIS — N18.2 CHRONIC KIDNEY DISEASE, STAGE II (MILD): ICD-10-CM

## 2024-01-25 PROCEDURE — 76775 US EXAM ABDO BACK WALL LIM: CPT

## 2024-02-01 RX ORDER — BLOOD SUGAR DIAGNOSTIC
STRIP MISCELLANEOUS
Qty: 300 EACH | Refills: 3 | Status: SHIPPED | OUTPATIENT
Start: 2024-02-01

## 2024-02-07 DIAGNOSIS — E11.65 UNCONTROLLED TYPE 2 DIABETES MELLITUS WITH HYPERGLYCEMIA, WITH LONG-TERM CURRENT USE OF INSULIN: ICD-10-CM

## 2024-02-07 DIAGNOSIS — Z79.4 UNCONTROLLED TYPE 2 DIABETES MELLITUS WITH HYPERGLYCEMIA, WITH LONG-TERM CURRENT USE OF INSULIN: ICD-10-CM

## 2024-02-14 NOTE — TELEPHONE ENCOUNTER
Rx Refill Note  Requested Prescriptions     Pending Prescriptions Disp Refills    pregabalin (LYRICA) 150 MG capsule [Pharmacy Med Name: PREGABALIN 150 MG Capsule] 180 capsule 0     Sig: TAKE 1 CAPSULE TWICE DAILY      Last office visit with prescribing clinician: 9/18/2023   Last telemedicine visit with prescribing clinician: Visit date not found   Next office visit with prescribing clinician: 12/18/2023                         Would you like a call back once the refill request has been completed: [] Yes [] No    If the office needs to give you a call back, can they leave a voicemail: [] Yes [] No    Faraz Brennan MA  10/11/23, 07:47 EDT   Yes

## 2024-02-28 DIAGNOSIS — I10 BENIGN ESSENTIAL HYPERTENSION: ICD-10-CM

## 2024-02-28 RX ORDER — LOSARTAN POTASSIUM AND HYDROCHLOROTHIAZIDE 25; 100 MG/1; MG/1
TABLET ORAL
Qty: 90 TABLET | Refills: 3 | Status: SHIPPED | OUTPATIENT
Start: 2024-02-28

## 2024-03-22 ENCOUNTER — TRANSCRIBE ORDERS (OUTPATIENT)
Dept: LAB | Facility: HOSPITAL | Age: 69
End: 2024-03-22
Payer: MEDICARE

## 2024-03-22 ENCOUNTER — LAB (OUTPATIENT)
Dept: LAB | Facility: HOSPITAL | Age: 69
End: 2024-03-22
Payer: MEDICARE

## 2024-03-22 DIAGNOSIS — I10 ESSENTIAL HYPERTENSION, MALIGNANT: ICD-10-CM

## 2024-03-22 DIAGNOSIS — M15.9 GENERALIZED OSTEOARTHROSIS, INVOLVING MULTIPLE SITES: ICD-10-CM

## 2024-03-22 DIAGNOSIS — E11.9 DIABETES MELLITUS WITHOUT COMPLICATION: ICD-10-CM

## 2024-03-22 DIAGNOSIS — E78.5 HYPERLIPIDEMIA, UNSPECIFIED HYPERLIPIDEMIA TYPE: ICD-10-CM

## 2024-03-22 DIAGNOSIS — M79.7 SCAPULOHUMERAL FIBROSITIS: ICD-10-CM

## 2024-03-22 DIAGNOSIS — N18.2 CHRONIC KIDNEY DISEASE, STAGE II (MILD): ICD-10-CM

## 2024-03-22 DIAGNOSIS — E11.40 TYPE 2 DIABETES, CONTROLLED, WITH NEUROPATHY: ICD-10-CM

## 2024-03-22 DIAGNOSIS — G47.33 OBSTRUCTIVE SLEEP APNEA (ADULT) (PEDIATRIC): ICD-10-CM

## 2024-03-22 DIAGNOSIS — N18.2 CHRONIC KIDNEY DISEASE, STAGE II (MILD): Primary | ICD-10-CM

## 2024-03-22 DIAGNOSIS — Z79.1 ENCOUNTER FOR LONG-TERM (CURRENT) USE OF NON-STEROIDAL ANTI-INFLAMMATORIES: ICD-10-CM

## 2024-03-22 DIAGNOSIS — E55.9 VITAMIN D DEFICIENCY: ICD-10-CM

## 2024-03-22 LAB
ALBUMIN SERPL-MCNC: 4.2 G/DL (ref 3.5–5.2)
ANION GAP SERPL CALCULATED.3IONS-SCNC: 10 MMOL/L (ref 5–15)
BUN SERPL-MCNC: 14 MG/DL (ref 8–23)
BUN/CREAT SERPL: 13.7 (ref 7–25)
CALCIUM SPEC-SCNC: 9.6 MG/DL (ref 8.6–10.5)
CHLORIDE SERPL-SCNC: 105 MMOL/L (ref 98–107)
CO2 SERPL-SCNC: 30 MMOL/L (ref 22–29)
CREAT SERPL-MCNC: 1.02 MG/DL (ref 0.57–1)
EGFRCR SERPLBLD CKD-EPI 2021: 60.4 ML/MIN/1.73
GLUCOSE SERPL-MCNC: 148 MG/DL (ref 65–99)
PHOSPHATE SERPL-MCNC: 3.2 MG/DL (ref 2.5–4.5)
POTASSIUM SERPL-SCNC: 3.8 MMOL/L (ref 3.5–5.2)
SODIUM SERPL-SCNC: 145 MMOL/L (ref 136–145)

## 2024-03-22 PROCEDURE — 36415 COLL VENOUS BLD VENIPUNCTURE: CPT

## 2024-03-22 PROCEDURE — 80069 RENAL FUNCTION PANEL: CPT

## 2024-03-25 ENCOUNTER — HOSPITAL ENCOUNTER (OUTPATIENT)
Facility: HOSPITAL | Age: 69
Setting detail: OBSERVATION
Discharge: HOME | End: 2024-03-26
Attending: EMERGENCY MEDICINE | Admitting: STUDENT IN AN ORGANIZED HEALTH CARE EDUCATION/TRAINING PROGRAM
Payer: MEDICARE

## 2024-03-25 ENCOUNTER — APPOINTMENT (OUTPATIENT)
Dept: ULTRASOUND IMAGING | Facility: HOSPITAL | Age: 69
End: 2024-03-25
Payer: MEDICARE

## 2024-03-25 ENCOUNTER — ANESTHESIA EVENT (OUTPATIENT)
Dept: PERIOP | Facility: HOSPITAL | Age: 69
End: 2024-03-25
Payer: MEDICARE

## 2024-03-25 ENCOUNTER — ANESTHESIA (OUTPATIENT)
Dept: PERIOP | Facility: HOSPITAL | Age: 69
End: 2024-03-25
Payer: MEDICARE

## 2024-03-25 DIAGNOSIS — K81.0 ACUTE CHOLECYSTITIS: Primary | ICD-10-CM

## 2024-03-25 DIAGNOSIS — N30.00 ACUTE CYSTITIS WITHOUT HEMATURIA: ICD-10-CM

## 2024-03-25 DIAGNOSIS — K81.9 CHOLECYSTITIS: ICD-10-CM

## 2024-03-25 DIAGNOSIS — I10 BENIGN ESSENTIAL HYPERTENSION: ICD-10-CM

## 2024-03-25 PROBLEM — Z90.49 STATUS POST CHOLECYSTECTOMY: Status: ACTIVE | Noted: 2024-03-25

## 2024-03-25 LAB
ALBUMIN SERPL-MCNC: 4.1 G/DL (ref 3.5–5.2)
ALBUMIN/GLOB SERPL: 1.8 G/DL
ALP SERPL-CCNC: 41 U/L (ref 39–117)
ALT SERPL W P-5'-P-CCNC: 14 U/L (ref 1–33)
ANION GAP SERPL CALCULATED.3IONS-SCNC: 14.6 MMOL/L (ref 5–15)
APTT PPP: 27.3 SECONDS (ref 23.5–35.5)
AST SERPL-CCNC: 19 U/L (ref 1–32)
BACTERIA UR QL AUTO: ABNORMAL /HPF
BASOPHILS # BLD AUTO: 0.05 10*3/MM3 (ref 0–0.2)
BASOPHILS NFR BLD AUTO: 0.4 % (ref 0–1.5)
BILIRUB SERPL-MCNC: 0.6 MG/DL (ref 0–1.2)
BILIRUB UR QL STRIP: NEGATIVE
BUN SERPL-MCNC: 11 MG/DL (ref 8–23)
BUN/CREAT SERPL: 16.4 (ref 7–25)
CALCIUM SPEC-SCNC: 9.4 MG/DL (ref 8.6–10.5)
CHLORIDE SERPL-SCNC: 101 MMOL/L (ref 98–107)
CLARITY UR: ABNORMAL
CO2 SERPL-SCNC: 23.4 MMOL/L (ref 22–29)
COLOR UR: YELLOW
CREAT SERPL-MCNC: 0.67 MG/DL (ref 0.57–1)
D-LACTATE SERPL-SCNC: 1.6 MMOL/L (ref 0.5–2)
DEPRECATED RDW RBC AUTO: 42.4 FL (ref 37–54)
EGFRCR SERPLBLD CKD-EPI 2021: 95.9 ML/MIN/1.73
EOSINOPHIL # BLD AUTO: 0.05 10*3/MM3 (ref 0–0.4)
EOSINOPHIL NFR BLD AUTO: 0.4 % (ref 0.3–6.2)
ERYTHROCYTE [DISTWIDTH] IN BLOOD BY AUTOMATED COUNT: 13 % (ref 12.3–15.4)
GLOBULIN UR ELPH-MCNC: 2.3 GM/DL
GLUCOSE BLDC GLUCOMTR-MCNC: 137 MG/DL (ref 70–130)
GLUCOSE SERPL-MCNC: 179 MG/DL (ref 65–99)
GLUCOSE UR STRIP-MCNC: ABNORMAL MG/DL
HCT VFR BLD AUTO: 48.1 % (ref 34–46.6)
HGB BLD-MCNC: 17 G/DL (ref 12–15.9)
HGB UR QL STRIP.AUTO: NEGATIVE
HOLD SPECIMEN: NORMAL
HOLD SPECIMEN: NORMAL
HYALINE CASTS UR QL AUTO: ABNORMAL /LPF
IMM GRANULOCYTES # BLD AUTO: 0.1 10*3/MM3 (ref 0–0.05)
IMM GRANULOCYTES NFR BLD AUTO: 0.9 % (ref 0–0.5)
INR PPP: 0.97 (ref 0.9–1.1)
KETONES UR QL STRIP: ABNORMAL
LEUKOCYTE ESTERASE UR QL STRIP.AUTO: NEGATIVE
LIPASE SERPL-CCNC: 30 U/L (ref 13–60)
LYMPHOCYTES # BLD AUTO: 0.98 10*3/MM3 (ref 0.7–3.1)
LYMPHOCYTES NFR BLD AUTO: 8.5 % (ref 19.6–45.3)
MCH RBC QN AUTO: 31.7 PG (ref 26.6–33)
MCHC RBC AUTO-ENTMCNC: 35.3 G/DL (ref 31.5–35.7)
MCV RBC AUTO: 89.7 FL (ref 79–97)
MONOCYTES # BLD AUTO: 0.42 10*3/MM3 (ref 0.1–0.9)
MONOCYTES NFR BLD AUTO: 3.7 % (ref 5–12)
NEUTROPHILS NFR BLD AUTO: 86.1 % (ref 42.7–76)
NEUTROPHILS NFR BLD AUTO: 9.89 10*3/MM3 (ref 1.7–7)
NITRITE UR QL STRIP: POSITIVE
NRBC BLD AUTO-RTO: 0 /100 WBC (ref 0–0.2)
PH UR STRIP.AUTO: <=5 [PH] (ref 5–8)
PLATELET # BLD AUTO: 195 10*3/MM3 (ref 140–450)
PMV BLD AUTO: 10 FL (ref 6–12)
POTASSIUM SERPL-SCNC: 4.2 MMOL/L (ref 3.5–5.2)
PROT SERPL-MCNC: 6.4 G/DL (ref 6–8.5)
PROT UR QL STRIP: NEGATIVE
PROTHROMBIN TIME: 13.4 SECONDS (ref 12.3–15.1)
RBC # BLD AUTO: 5.36 10*6/MM3 (ref 3.77–5.28)
RBC # UR STRIP: ABNORMAL /HPF
REF LAB TEST METHOD: ABNORMAL
SODIUM SERPL-SCNC: 139 MMOL/L (ref 136–145)
SP GR UR STRIP: >=1.03 (ref 1–1.03)
SQUAMOUS #/AREA URNS HPF: ABNORMAL /HPF
UROBILINOGEN UR QL STRIP: ABNORMAL
WBC # UR STRIP: ABNORMAL /HPF
WBC NRBC COR # BLD AUTO: 11.49 10*3/MM3 (ref 3.4–10.8)
WHOLE BLOOD HOLD COAG: NORMAL
WHOLE BLOOD HOLD SPECIMEN: NORMAL

## 2024-03-25 PROCEDURE — G0378 HOSPITAL OBSERVATION PER HR: HCPCS

## 2024-03-25 PROCEDURE — 25010000002 METRONIDAZOLE 500 MG/100ML SOLUTION: Performed by: EMERGENCY MEDICINE

## 2024-03-25 PROCEDURE — S0260 H&P FOR SURGERY: HCPCS | Performed by: STUDENT IN AN ORGANIZED HEALTH CARE EDUCATION/TRAINING PROGRAM

## 2024-03-25 PROCEDURE — 96365 THER/PROPH/DIAG IV INF INIT: CPT

## 2024-03-25 PROCEDURE — 76705 ECHO EXAM OF ABDOMEN: CPT

## 2024-03-25 PROCEDURE — 99223 1ST HOSP IP/OBS HIGH 75: CPT | Performed by: STUDENT IN AN ORGANIZED HEALTH CARE EDUCATION/TRAINING PROGRAM

## 2024-03-25 PROCEDURE — 25010000002 HYDROMORPHONE 1 MG/ML SOLUTION: Performed by: NURSE ANESTHETIST, CERTIFIED REGISTERED

## 2024-03-25 PROCEDURE — 99285 EMERGENCY DEPT VISIT HI MDM: CPT

## 2024-03-25 PROCEDURE — 25010000002 KETOROLAC TROMETHAMINE PER 15 MG: Performed by: EMERGENCY MEDICINE

## 2024-03-25 PROCEDURE — 25010000002 MORPHINE PER 10 MG: Performed by: EMERGENCY MEDICINE

## 2024-03-25 PROCEDURE — 25010000002 ONDANSETRON PER 1 MG: Performed by: NURSE ANESTHETIST, CERTIFIED REGISTERED

## 2024-03-25 PROCEDURE — 25010000002 FENTANYL CITRATE (PF) 50 MCG/ML SOLUTION: Performed by: NURSE ANESTHETIST, CERTIFIED REGISTERED

## 2024-03-25 PROCEDURE — 80053 COMPREHEN METABOLIC PANEL: CPT | Performed by: EMERGENCY MEDICINE

## 2024-03-25 PROCEDURE — 25010000002 METRONIDAZOLE 500 MG/100ML SOLUTION: Performed by: STUDENT IN AN ORGANIZED HEALTH CARE EDUCATION/TRAINING PROGRAM

## 2024-03-25 PROCEDURE — 25010000002 LIDOCAINE 1 % SOLUTION: Performed by: STUDENT IN AN ORGANIZED HEALTH CARE EDUCATION/TRAINING PROGRAM

## 2024-03-25 PROCEDURE — 83605 ASSAY OF LACTIC ACID: CPT | Performed by: EMERGENCY MEDICINE

## 2024-03-25 PROCEDURE — 96368 THER/DIAG CONCURRENT INF: CPT

## 2024-03-25 PROCEDURE — 83690 ASSAY OF LIPASE: CPT | Performed by: EMERGENCY MEDICINE

## 2024-03-25 PROCEDURE — 85730 THROMBOPLASTIN TIME PARTIAL: CPT | Performed by: EMERGENCY MEDICINE

## 2024-03-25 PROCEDURE — 82948 REAGENT STRIP/BLOOD GLUCOSE: CPT

## 2024-03-25 PROCEDURE — 25810000003 LACTATED RINGERS PER 1000 ML: Performed by: STUDENT IN AN ORGANIZED HEALTH CARE EDUCATION/TRAINING PROGRAM

## 2024-03-25 PROCEDURE — 25010000002 SUGAMMADEX 200 MG/2ML SOLUTION: Performed by: NURSE ANESTHETIST, CERTIFIED REGISTERED

## 2024-03-25 PROCEDURE — 25010000002 HEPARIN (PORCINE) PER 1000 UNITS: Performed by: STUDENT IN AN ORGANIZED HEALTH CARE EDUCATION/TRAINING PROGRAM

## 2024-03-25 PROCEDURE — 25010000002 CEFTRIAXONE PER 250 MG: Performed by: EMERGENCY MEDICINE

## 2024-03-25 PROCEDURE — 96376 TX/PRO/DX INJ SAME DRUG ADON: CPT

## 2024-03-25 PROCEDURE — 88304 TISSUE EXAM BY PATHOLOGIST: CPT

## 2024-03-25 PROCEDURE — 47562 LAPAROSCOPIC CHOLECYSTECTOMY: CPT | Performed by: STUDENT IN AN ORGANIZED HEALTH CARE EDUCATION/TRAINING PROGRAM

## 2024-03-25 PROCEDURE — 81001 URINALYSIS AUTO W/SCOPE: CPT | Performed by: EMERGENCY MEDICINE

## 2024-03-25 PROCEDURE — 85025 COMPLETE CBC W/AUTO DIFF WBC: CPT | Performed by: EMERGENCY MEDICINE

## 2024-03-25 PROCEDURE — 25010000002 HYDROMORPHONE PER 4 MG: Performed by: NURSE ANESTHETIST, CERTIFIED REGISTERED

## 2024-03-25 PROCEDURE — 25010000002 PROPOFOL 10 MG/ML EMULSION: Performed by: NURSE ANESTHETIST, CERTIFIED REGISTERED

## 2024-03-25 PROCEDURE — 85610 PROTHROMBIN TIME: CPT | Performed by: EMERGENCY MEDICINE

## 2024-03-25 PROCEDURE — 25010000002 DEXAMETHASONE PER 1 MG: Performed by: NURSE ANESTHETIST, CERTIFIED REGISTERED

## 2024-03-25 PROCEDURE — 25810000003 SODIUM CHLORIDE 0.9 % SOLUTION: Performed by: STUDENT IN AN ORGANIZED HEALTH CARE EDUCATION/TRAINING PROGRAM

## 2024-03-25 PROCEDURE — 96375 TX/PRO/DX INJ NEW DRUG ADDON: CPT

## 2024-03-25 PROCEDURE — 25010000002 INDOCYANINE GREEN 25 MG RECONSTITUTED SOLUTION: Performed by: STUDENT IN AN ORGANIZED HEALTH CARE EDUCATION/TRAINING PROGRAM

## 2024-03-25 DEVICE — CLIP LIG HEMOLOK PA LG 6CT PRP: Type: IMPLANTABLE DEVICE | Site: ABDOMEN | Status: FUNCTIONAL

## 2024-03-25 RX ORDER — INDOCYANINE GREEN AND WATER 25 MG
2.5 KIT INJECTION ONCE
Status: COMPLETED | OUTPATIENT
Start: 2024-03-25 | End: 2024-03-25

## 2024-03-25 RX ORDER — HYDROCODONE BITARTRATE AND ACETAMINOPHEN 5; 325 MG/1; MG/1
1 TABLET ORAL EVERY 6 HOURS PRN
Qty: 10 TABLET | Refills: 0 | Status: SHIPPED | OUTPATIENT
Start: 2024-03-25

## 2024-03-25 RX ORDER — LIDOCAINE HYDROCHLORIDE 10 MG/ML
INJECTION, SOLUTION INFILTRATION; PERINEURAL AS NEEDED
Status: DISCONTINUED | OUTPATIENT
Start: 2024-03-25 | End: 2024-03-25 | Stop reason: HOSPADM

## 2024-03-25 RX ORDER — DEXAMETHASONE SODIUM PHOSPHATE 4 MG/ML
INJECTION, SOLUTION INTRA-ARTICULAR; INTRALESIONAL; INTRAMUSCULAR; INTRAVENOUS; SOFT TISSUE AS NEEDED
Status: DISCONTINUED | OUTPATIENT
Start: 2024-03-25 | End: 2024-03-25 | Stop reason: SURG

## 2024-03-25 RX ORDER — ROCURONIUM BROMIDE 50 MG/5 ML
SYRINGE (ML) INTRAVENOUS AS NEEDED
Status: DISCONTINUED | OUTPATIENT
Start: 2024-03-25 | End: 2024-03-25 | Stop reason: SURG

## 2024-03-25 RX ORDER — LIDOCAINE HCL/PF 100 MG/5ML
SYRINGE (ML) INJECTION AS NEEDED
Status: DISCONTINUED | OUTPATIENT
Start: 2024-03-25 | End: 2024-03-25 | Stop reason: SURG

## 2024-03-25 RX ORDER — BUPIVACAINE HYDROCHLORIDE AND EPINEPHRINE 5; 5 MG/ML; UG/ML
INJECTION, SOLUTION EPIDURAL; INTRACAUDAL; PERINEURAL AS NEEDED
Status: DISCONTINUED | OUTPATIENT
Start: 2024-03-25 | End: 2024-03-25 | Stop reason: HOSPADM

## 2024-03-25 RX ORDER — PROPOFOL 10 MG/ML
VIAL (ML) INTRAVENOUS AS NEEDED
Status: DISCONTINUED | OUTPATIENT
Start: 2024-03-25 | End: 2024-03-25 | Stop reason: SURG

## 2024-03-25 RX ORDER — INSULIN ASPART 100 [IU]/ML
3-14 INJECTION, SOLUTION INTRAVENOUS; SUBCUTANEOUS
Status: DISCONTINUED | OUTPATIENT
Start: 2024-03-26 | End: 2024-03-26 | Stop reason: HOSPADM

## 2024-03-25 RX ORDER — SODIUM CHLORIDE 0.9 % (FLUSH) 0.9 %
10 SYRINGE (ML) INJECTION AS NEEDED
Status: DISCONTINUED | OUTPATIENT
Start: 2024-03-25 | End: 2024-03-26 | Stop reason: HOSPADM

## 2024-03-25 RX ORDER — ONDANSETRON 2 MG/ML
4 INJECTION INTRAMUSCULAR; INTRAVENOUS EVERY 6 HOURS PRN
Status: DISCONTINUED | OUTPATIENT
Start: 2024-03-25 | End: 2024-03-26 | Stop reason: HOSPADM

## 2024-03-25 RX ORDER — SODIUM CHLORIDE 9 MG/ML
100 INJECTION, SOLUTION INTRAVENOUS CONTINUOUS
Status: DISCONTINUED | OUTPATIENT
Start: 2024-03-25 | End: 2024-03-26 | Stop reason: HOSPADM

## 2024-03-25 RX ORDER — SODIUM CHLORIDE 9 MG/ML
40 INJECTION, SOLUTION INTRAVENOUS AS NEEDED
Status: DISCONTINUED | OUTPATIENT
Start: 2024-03-25 | End: 2024-03-26 | Stop reason: HOSPADM

## 2024-03-25 RX ORDER — HYDROCODONE BITARTRATE AND ACETAMINOPHEN 5; 325 MG/1; MG/1
1 TABLET ORAL EVERY 6 HOURS PRN
Status: DISCONTINUED | OUTPATIENT
Start: 2024-03-25 | End: 2024-03-26 | Stop reason: HOSPADM

## 2024-03-25 RX ORDER — METRONIDAZOLE 500 MG/100ML
500 INJECTION, SOLUTION INTRAVENOUS EVERY 8 HOURS
Qty: 100 ML | Refills: 0 | Status: COMPLETED | OUTPATIENT
Start: 2024-03-25 | End: 2024-03-25

## 2024-03-25 RX ORDER — METOPROLOL SUCCINATE 25 MG/1
25 TABLET, EXTENDED RELEASE ORAL DAILY
Status: DISCONTINUED | OUTPATIENT
Start: 2024-03-25 | End: 2024-03-26 | Stop reason: HOSPADM

## 2024-03-25 RX ORDER — SODIUM CHLORIDE 9 MG/ML
40 INJECTION, SOLUTION INTRAVENOUS AS NEEDED
Status: DISCONTINUED | OUTPATIENT
Start: 2024-03-25 | End: 2024-03-25 | Stop reason: HOSPADM

## 2024-03-25 RX ORDER — HYDROMORPHONE HYDROCHLORIDE 2 MG/ML
INJECTION, SOLUTION INTRAMUSCULAR; INTRAVENOUS; SUBCUTANEOUS AS NEEDED
Status: DISCONTINUED | OUTPATIENT
Start: 2024-03-25 | End: 2024-03-25 | Stop reason: SURG

## 2024-03-25 RX ORDER — KETOROLAC TROMETHAMINE 30 MG/ML
15 INJECTION, SOLUTION INTRAMUSCULAR; INTRAVENOUS ONCE
Status: COMPLETED | OUTPATIENT
Start: 2024-03-25 | End: 2024-03-25

## 2024-03-25 RX ORDER — DEXTROSE MONOHYDRATE 25 G/50ML
25 INJECTION, SOLUTION INTRAVENOUS
Status: DISCONTINUED | OUTPATIENT
Start: 2024-03-25 | End: 2024-03-26 | Stop reason: HOSPADM

## 2024-03-25 RX ORDER — SODIUM CHLORIDE 0.9 % (FLUSH) 0.9 %
10 SYRINGE (ML) INJECTION AS NEEDED
Status: DISCONTINUED | OUTPATIENT
Start: 2024-03-25 | End: 2024-03-25 | Stop reason: HOSPADM

## 2024-03-25 RX ORDER — FENTANYL CITRATE 50 UG/ML
INJECTION, SOLUTION INTRAMUSCULAR; INTRAVENOUS AS NEEDED
Status: DISCONTINUED | OUTPATIENT
Start: 2024-03-25 | End: 2024-03-25 | Stop reason: SURG

## 2024-03-25 RX ORDER — NICOTINE POLACRILEX 4 MG
15 LOZENGE BUCCAL
Status: DISCONTINUED | OUTPATIENT
Start: 2024-03-25 | End: 2024-03-26 | Stop reason: HOSPADM

## 2024-03-25 RX ORDER — PREGABALIN 75 MG/1
150 CAPSULE ORAL 2 TIMES DAILY
Status: DISCONTINUED | OUTPATIENT
Start: 2024-03-25 | End: 2024-03-26 | Stop reason: HOSPADM

## 2024-03-25 RX ORDER — ONDANSETRON 2 MG/ML
4 INJECTION INTRAMUSCULAR; INTRAVENOUS ONCE AS NEEDED
Status: DISCONTINUED | OUTPATIENT
Start: 2024-03-25 | End: 2024-03-25 | Stop reason: HOSPADM

## 2024-03-25 RX ORDER — LOSARTAN POTASSIUM 50 MG/1
100 TABLET ORAL
Status: DISCONTINUED | OUTPATIENT
Start: 2024-03-25 | End: 2024-03-26 | Stop reason: HOSPADM

## 2024-03-25 RX ORDER — HEPARIN SODIUM 5000 [USP'U]/ML
5000 INJECTION, SOLUTION INTRAVENOUS; SUBCUTANEOUS ONCE
Status: COMPLETED | OUTPATIENT
Start: 2024-03-25 | End: 2024-03-25

## 2024-03-25 RX ORDER — ACETAMINOPHEN 325 MG/1
650 TABLET ORAL EVERY 4 HOURS PRN
Status: DISCONTINUED | OUTPATIENT
Start: 2024-03-25 | End: 2024-03-26 | Stop reason: HOSPADM

## 2024-03-25 RX ORDER — METRONIDAZOLE 500 MG/100ML
500 INJECTION, SOLUTION INTRAVENOUS EVERY 8 HOURS
Status: DISCONTINUED | OUTPATIENT
Start: 2024-03-25 | End: 2024-03-26 | Stop reason: HOSPADM

## 2024-03-25 RX ORDER — SODIUM CHLORIDE, SODIUM LACTATE, POTASSIUM CHLORIDE, CALCIUM CHLORIDE 600; 310; 30; 20 MG/100ML; MG/100ML; MG/100ML; MG/100ML
50 INJECTION, SOLUTION INTRAVENOUS CONTINUOUS
Status: DISCONTINUED | OUTPATIENT
Start: 2024-03-25 | End: 2024-03-26

## 2024-03-25 RX ORDER — SODIUM CHLORIDE 0.9 % (FLUSH) 0.9 %
10 SYRINGE (ML) INJECTION EVERY 12 HOURS SCHEDULED
Status: DISCONTINUED | OUTPATIENT
Start: 2024-03-25 | End: 2024-03-26 | Stop reason: HOSPADM

## 2024-03-25 RX ORDER — DULOXETIN HYDROCHLORIDE 30 MG/1
60 CAPSULE, DELAYED RELEASE ORAL DAILY
Status: DISCONTINUED | OUTPATIENT
Start: 2024-03-26 | End: 2024-03-26 | Stop reason: HOSPADM

## 2024-03-25 RX ORDER — ACETAMINOPHEN 160 MG/5ML
650 SOLUTION ORAL EVERY 4 HOURS PRN
Status: DISCONTINUED | OUTPATIENT
Start: 2024-03-25 | End: 2024-03-26 | Stop reason: HOSPADM

## 2024-03-25 RX ORDER — SODIUM CHLORIDE 0.9 % (FLUSH) 0.9 %
10 SYRINGE (ML) INJECTION EVERY 12 HOURS SCHEDULED
Status: DISCONTINUED | OUTPATIENT
Start: 2024-03-25 | End: 2024-03-25 | Stop reason: HOSPADM

## 2024-03-25 RX ORDER — HYDROCHLOROTHIAZIDE 25 MG/1
25 TABLET ORAL
Status: DISCONTINUED | OUTPATIENT
Start: 2024-03-25 | End: 2024-03-26 | Stop reason: HOSPADM

## 2024-03-25 RX ORDER — ONDANSETRON 2 MG/ML
INJECTION INTRAMUSCULAR; INTRAVENOUS AS NEEDED
Status: DISCONTINUED | OUTPATIENT
Start: 2024-03-25 | End: 2024-03-25 | Stop reason: SURG

## 2024-03-25 RX ORDER — SODIUM CHLORIDE 9 MG/ML
INJECTION, SOLUTION INTRAVENOUS AS NEEDED
Status: DISCONTINUED | OUTPATIENT
Start: 2024-03-25 | End: 2024-03-25 | Stop reason: HOSPADM

## 2024-03-25 RX ORDER — ACETAMINOPHEN 650 MG/1
650 SUPPOSITORY RECTAL EVERY 4 HOURS PRN
Status: DISCONTINUED | OUTPATIENT
Start: 2024-03-25 | End: 2024-03-26 | Stop reason: HOSPADM

## 2024-03-25 RX ADMIN — FENTANYL CITRATE 100 MCG: 50 INJECTION, SOLUTION INTRAMUSCULAR; INTRAVENOUS at 16:51

## 2024-03-25 RX ADMIN — SODIUM CHLORIDE 100 ML/HR: 9 INJECTION, SOLUTION INTRAVENOUS at 21:13

## 2024-03-25 RX ADMIN — Medication 100 MG: at 16:51

## 2024-03-25 RX ADMIN — MORPHINE SULFATE 4 MG: 4 INJECTION, SOLUTION INTRAMUSCULAR; INTRAVENOUS at 11:54

## 2024-03-25 RX ADMIN — CEFTRIAXONE SODIUM 2000 MG: 1 INJECTION, POWDER, FOR SOLUTION INTRAMUSCULAR; INTRAVENOUS at 11:25

## 2024-03-25 RX ADMIN — METRONIDAZOLE 500 MG: 5 INJECTION, SOLUTION INTRAVENOUS at 11:25

## 2024-03-25 RX ADMIN — HYDROMORPHONE HYDROCHLORIDE 1 MG: 2 INJECTION, SOLUTION INTRAMUSCULAR; INTRAVENOUS; SUBCUTANEOUS at 16:59

## 2024-03-25 RX ADMIN — SUGAMMADEX 160 MG: 100 INJECTION, SOLUTION INTRAVENOUS at 18:59

## 2024-03-25 RX ADMIN — METRONIDAZOLE 500 MG: 5 INJECTION, SOLUTION INTRAVENOUS at 21:13

## 2024-03-25 RX ADMIN — ONDANSETRON 4 MG: 2 INJECTION INTRAMUSCULAR; INTRAVENOUS at 18:46

## 2024-03-25 RX ADMIN — HEPARIN SODIUM 5000 UNITS: 5000 INJECTION INTRAVENOUS; SUBCUTANEOUS at 16:27

## 2024-03-25 RX ADMIN — KETOROLAC TROMETHAMINE 15 MG: 30 INJECTION, SOLUTION INTRAMUSCULAR; INTRAVENOUS at 09:52

## 2024-03-25 RX ADMIN — Medication 10 MG: at 18:08

## 2024-03-25 RX ADMIN — INDOCYANINE GREEN AND WATER 2.5 MG: KIT at 16:27

## 2024-03-25 RX ADMIN — HYDROMORPHONE HYDROCHLORIDE 0.5 MG: 1 INJECTION, SOLUTION INTRAMUSCULAR; INTRAVENOUS; SUBCUTANEOUS at 16:20

## 2024-03-25 RX ADMIN — MORPHINE SULFATE 4 MG: 4 INJECTION, SOLUTION INTRAMUSCULAR; INTRAVENOUS at 13:33

## 2024-03-25 RX ADMIN — Medication 10 MG: at 17:43

## 2024-03-25 RX ADMIN — Medication 40 MG: at 16:51

## 2024-03-25 RX ADMIN — SODIUM CHLORIDE, POTASSIUM CHLORIDE, SODIUM LACTATE AND CALCIUM CHLORIDE: 600; 310; 30; 20 INJECTION, SOLUTION INTRAVENOUS at 18:04

## 2024-03-25 RX ADMIN — SODIUM CHLORIDE 1000 ML: 9 INJECTION, SOLUTION INTRAVENOUS at 21:34

## 2024-03-25 RX ADMIN — DEXAMETHASONE SODIUM PHOSPHATE 8 MG: 4 INJECTION, SOLUTION INTRA-ARTICULAR; INTRALESIONAL; INTRAMUSCULAR; INTRAVENOUS; SOFT TISSUE at 16:57

## 2024-03-25 RX ADMIN — HYDROMORPHONE HYDROCHLORIDE 1 MG: 2 INJECTION, SOLUTION INTRAMUSCULAR; INTRAVENOUS; SUBCUTANEOUS at 18:28

## 2024-03-25 RX ADMIN — SODIUM CHLORIDE, POTASSIUM CHLORIDE, SODIUM LACTATE AND CALCIUM CHLORIDE 50 ML/HR: 600; 310; 30; 20 INJECTION, SOLUTION INTRAVENOUS at 15:47

## 2024-03-25 RX ADMIN — PROPOFOL 200 MG: 10 INJECTION, EMULSION INTRAVENOUS at 16:51

## 2024-03-25 NOTE — OP NOTE
PATIENT:     Mercy Clemens    DATE OF SURGERY:     3/25/2024    PHYSICIAN:   Berta Clifton DO    REFERRING PHYSICIAN:  Carolina Soto APRN    YOB: 1955    PREOPERATIVE DIAGNOSIS:  Acute cholecystitis    POSTOPERATIVE DIAGNOSIS:  Acute gangrenous cholecystitis    PROCEDURE:  Robotic assisted laparoscopic cholecystectomy with ICG    EBL:  Less than 50 cc    COMPLICATIONS:  None    ANESTHESIA:  General endotracheal.    HISTORY:  68 year old female presented to the ED with complaint of abdominal pain for 24 hours. History, physical, laboratory, and imaging studies consistent with acute cholecystitis.      OPERATIVE PROCEDURE:  The patient was seen in the preoperative area. History and physical was reviewed, consent was verified, and all questions were answered. IV ICG was injected preoperatively. The patient was taken to the operating room, placed in the supine position, and given general endotracheal anesthesia.  The patient was prepped and draped in the normal sterile fashion, and also received preoperative IV antibiotics.  An appropriate timeout was performed by the nursing staff prior to the incision.    A periumbilical incision was made and deepened to the fascia. Utilizing a modified Sara technique, the abdomen was entered under direct visualization. An 8mm robotic trocar was inserted and the abdomen was insufflated to a pressure of 15 mmHg. Patient tolerated insufflation well. A laparoscope was inserted and the abdomen was inspected. No injury from initial port placement was identified. Additional ports were placed under direct visualization in the following fashion: two 8mm ports in the left upper abdomen and one 8 mm port in the upper right abdomen. The [employer]i robot was draped in sterile fashion and was docked to the ports in standard fashion.     The gallbladder was identified and noted to be gangrenous, enlarged, and grossly distended. A hole was made in the gallbladder fundus and  bile was suctioned out to help facilitate grasping and retraction.The fundus of the gallbladder was grasped and retracted toward the patient's right shoulder. The gallbladder was visualized completely and noted to be gangrenous, edematous, and acutely inflamed. The gallbladder infundibulum was grasped and retracted toward the right lower quadrant of the abdomen to expose the hilar structures. Dissection in the triangle of Calot was carried out until the critical view of safety was obtained, identifying the cystic duct and artery as the only two structures entering the gallbladder. ICG was used to visualize the biliary anatomy. Two hemoclips were placed proximally on the cystic duct and one distally. The duct was divided. The cystic artery was similarly clipped and divided. Bovie electrocautery was then used to remove the gallbladder from the liver bed.  It was then placed into a laparoscopic retrieval bag and removed via the periumbilical port.     The gallbladder fossa was inspected. Bile spillage occurred during the procedure and this was copiously irrigated and suctioned. Hemostasis was excellent and there was no evidence of biliary leakage. All trocar sites were injected with a local anesthetic mixture.  Trocars were removed under direct vision and the fascia was closed with 0-Vicryl suture and 4-0 Vicryl subcuticular stitch. The skin was dressed with surgical glue.    The patient was stable at this point and subsequently transferred back to the recovery room in stable condition.    Berta Clifton DO

## 2024-03-25 NOTE — H&P
[facility]   HISTORY AND PHYSICAL      Name:  Mercy Clemens   Age:  68 y.o.  Sex:  female  :  1955  MRN:  [id number]   Visit Number:  [id number]  Admission Date:  3/25/2024  Date Of Service:  24  Primary Care Physician:  Carolina Soto APRN    Chief Complaint:     Abdominal pain    History Of Presenting Illness:      68 year old female with PMH DM2, HTN, and fibromyalgia presented to ED with one day of epigastric and RUQ abdominal pain. She states that yesterday she had soup beans and a baked potato with butter for dinner, shortly thereafter she developed abdominal pain. It got progressively worse overnight. She tried to take Pepto and it did not help. She denies nausea, vomiting, and diarrhea. She has never had pain like this in the past.     Review Of Systems:     General ROS: Patient denies any fevers, chills or loss of consciousness.  No complaints of generalized weakness  Psychological ROS: Denies any hallucinations and delusions.  Ophthalmic ROS: no transient loss of vision.  ENT ROS: Denies sore throat, nasal congestion or ear pain.   Allergy and Immunology ROS: Denies rash or itching.  Hematological and Lymphatic ROS: Denies neck swelling or easy bleeding.  Endocrine ROS: Denies any recent unintentional weight gain or loss.  Breast ROS: Denies any pain or swelling.  Respiratory ROS: Denies cough or shortness of breath.   Cardiovascular ROS: Denies chest pain or palpitations. No history of exertional chest pain.   Gastrointestinal ROS: Admits to abdominal pain. Denies nausea, vomiting, and diarrhea.  Genito-Urinary ROS: Denies dysuria or hematuria.  Musculoskeletal ROS: no back pain. No muscle pain. No calf pain.   Neurological ROS: Denies any focal weakness. No loss of consciousness. Denies any numbness.   Dermatological ROS: Denies any redness or pruritis.     Past Medical History:    Past Medical History:   Diagnosis Date    Abdominal bloating     Abdominal  pain     Allergic 1957    Penicillin    Arthritis     Knees hips    BMI 34.0-34.9,adult     Cataract     Mini cataracts    Diabetes mellitus     Diarrhea     Diverticulosis     Noted in colon test    Encounter for long-term (current) use of medications     Fatigue     Fibromyalgia, primary 2011    Entire body    Hyperlipidemia     Hypertension     Low back pain     Detiorated disk    Marked eosinophilia     Myalgia and myositis     Nausea     Obesity     Weight at 235 at one time    Osteopenia     Noted in test    Pneumonia     Have had one occurrance of pneumonia    Reaction to chronic stress     RUQ abdominal pain     Urinary tract infection     Uti have had many    Viral gastroenteritis        Past Surgical history:    Past Surgical History:   Procedure Laterality Date    APPENDECTOMY      Taken out    BILATERAL OOPHORECTOMY      BLADDER SURGERY      Bladder tack : Mckay Alston procedure    BREAST BIOPSY      COLONOSCOPY      march 2015    EYE SURGERY      To straighten pulled eyes    HYSTERECTOMY      OOPHORECTOMY         Social History:    Social History     Socioeconomic History    Marital status:    Tobacco Use    Smoking status: Never    Smokeless tobacco: Never   Vaping Use    Vaping status: Never Used   Substance and Sexual Activity    Alcohol use: No    Drug use: No    Sexual activity: Yes     Partners: Male     Comment: Hysterectomy       Family History:    Family History   Problem Relation Age of Onset    Arthritis Mother     Hypertension Mother     Stroke Mother         small stroke    Hyperlipidemia Mother     Cancer Father         lung cancer    Lung cancer Father     No Known Problems Sister     No Known Problems Sister     Lymphoma Maternal Uncle     Cancer Maternal Uncle     Lung cancer Maternal Uncle     Cancer Paternal Uncle     Lung cancer Paternal Uncle     Breast cancer Neg Hx        Allergies:      Penicillins    Home Medications:    Prior to Admission Medications        Prescriptions Last Dose Informant Patient Reported? Taking?    alendronate (FOSAMAX) 70 MG tablet 3/24/2024  Yes Yes    Diclofenac Sodium (VOLTAREN) 1 % gel gel 3/24/2024  Yes Yes    APPLY 2 GRAMS TO JOINTS OF UPPER EXTREMITY AND 4 GRAMS TO JOINTS OF LOWER EXTREMITY FOUR TIMES DAILY    DULoxetine (CYMBALTA) 60 MG capsule 3/24/2024  Yes Yes    empagliflozin (Jardiance) 10 MG tablet tablet 3/24/2024  No Yes    Take 1 tablet by mouth Daily.     MG tablet 3/24/2024  No Yes    TAKE 1 TABLET BY MOUTH EVERY 8 (EIGHT) HOURS AS NEEDED FOR MODERATE PAIN .    insulin aspart (NovoLOG FlexPen) 100 UNIT/ML solution pen-injector sc pen 3/24/2024  No Yes    Patient uses 20 units with each meal. 20 units 4 times daily.    Jardiance 10 MG tablet tablet 3/24/2024  No Yes    TAKE 1 TABLET EVERY DAY    losartan-hydrochlorothiazide (HYZAAR) 100-25 MG per tablet 3/24/2024  No Yes    TAKE 1 TABLET EVERY DAY    meloxicam (MOBIC) 15 MG tablet 3/24/2024  No Yes    Take 1 tablet by mouth Daily.    metoprolol succinate XL (TOPROL-XL) 25 MG 24 hr tablet 3/24/2024  No Yes    TAKE 1 TABLET EVERY DAY    pregabalin (LYRICA) 150 MG capsule 3/24/2024  No Yes    TAKE 1 CAPSULE TWICE DAILY    Alcohol Swabs (B-D SINGLE USE SWABS REGULAR) pads Unknown  No No    Use 3 times a day with insulin injections.    Blood Glucose Monitoring Suppl w/Device kit Unknown  No No    Check tid E11.9 wants accu-check guide meter    Continuous Blood Gluc  (FreeStyle James 2 Nubieber) device Unknown  No No    1 Device Every 14 (Fourteen) Days.    Continuous Blood Gluc Sensor (FreeStyle James 2 Sensor) misc Unknown  No No    1 Device Every 14 (Fourteen) Days.    glucose blood (Accu-Chek Guide) test strip Unknown  No No    TEST BLOOD SUGAR THREE TIMES DAILY    Insulin Pen Needle (B-D UF III MINI PEN NEEDLES) 31G X 5 MM misc Unknown  No No    USE ONE PEN NEEDLE TO GIVE INSULIN SHOTS FOUR TIMES A DAY    Lancets misc Unknown  No No    Check three times daily     Tirzepatide (Mounjaro) 5 MG/0.5ML solution pen-injector pen 3/18/2024  No No    Inject 0.5 mL under the skin into the appropriate area as directed 1 (One) Time Per Week.               ED Medications:    Medications   sodium chloride 0.9 % flush 10 mL ( Intravenous MAR Hold 3/25/24 1507)   sodium chloride 0.9 % flush 10 mL (has no administration in time range)   sodium chloride 0.9 % flush 10 mL (has no administration in time range)   sodium chloride 0.9 % infusion 40 mL (has no administration in time range)   lactated ringers infusion (50 mL/hr Intravenous New Bag 3/25/24 1547)   ketorolac (TORADOL) injection 15 mg (15 mg Intravenous Given 3/25/24 0952)   cefTRIAXone (ROCEPHIN) 2,000 mg in sodium chloride 0.9 % 100 mL IVPB (0 mg Intravenous Stopped 3/25/24 1202)   metroNIDAZOLE (FLAGYL) IVPB 500 mg (0 mg Intravenous Stopped 3/25/24 1202)   morphine injection 4 mg (4 mg Intravenous Given 3/25/24 1154)   heparin (porcine) 5000 UNIT/ML injection 5,000 Units (5,000 Units Subcutaneous Given 3/25/24 1627)   indocyanine green (IC-GREEN) injection 2.5 mg (2.5 mg Intravenous Given 3/25/24 1627)   morphine injection 4 mg (4 mg Intravenous Given 3/25/24 1333)   HYDROmorphone (DILAUDID) injection 0.5 mg (0.5 mg Intravenous Given 3/25/24 1620)       Vital Signs:    Temp:  [97.2 °F (36.2 °C)-98.4 °F (36.9 °C)] 97.2 °F (36.2 °C)  Heart Rate:  [54-62] 62  Resp:  [16-18] 16  BP: (117-140)/(60-70) 117/70        03/25/24  0914   Weight: 77.1 kg (170 lb)       Body mass index is 31.09 kg/m².    Physical Exam:      General Appearance:  Alert and cooperative, not in any acute distress.   Head:  Atraumatic and normocephalic, without obvious abnormality.   Eyes:          PERRLA, conjunctivae and sclerae normal, no Icterus. No pallor. Extraocular movements are within normal limits.   Throat: No oral lesions, no thrush, oral mucosa moist.   Neck: Supple, trachea midline, no thyromegaly, no carotid bruit.   Respiratory/Lungs:   Breath sounds  heard bilaterally equally.  No crackles or wheezing. No Pleural rub or bronchial breathing. Normal respiratory effort.    Cardiovascular/Heart:  Normal S1 and S2, no murmur. No edema   GI/Abdomen:   Obese, soft, non-distended, RUQ and epigastric abdominal pain to palpation. +Estrada's sign.                Musculoskeletal/ Extremities:   Moves all extremities well   Skin: No bleeding, bruising or rash, no induration   Psychiatric : Alert and oriented ×3.  No depression or anxiety    Neurologic: Cranial nerves 2 - 12 grossly intact, sensation intact, Motor power is normal and equal bilaterally.         Labs:    Lab Results (last 24 hours)       Procedure Component Value Units Date/Time    POC Glucose Once [[id number]]  (Abnormal) Collected: 03/25/24 1532    Specimen: Blood Updated: 03/25/24 1547     Glucose 137 mg/dL      Comment: Serial Number: [id number]:  [id number]       Protime-INR [[id number]]  (Normal) Collected: 03/25/24 0950    Specimen: Blood Updated: 03/25/24 1134     Protime 13.4 Seconds      INR 0.97    Narrative:      Suggested INR therapeutic range for stable oral anticoagulant therapy:    Low Intensity therapy:   1.5-2.0  Moderate Intensity therapy:   2.0-3.0  High Intensity therapy:   2.5-4.0    aPTT [[id number]]  (Normal) Collected: 03/25/24 0950    Specimen: Blood Updated: 03/25/24 1134     PTT 27.3 seconds     [place] Draw [[id number]] Collected: 03/25/24 0950    Specimen: Blood Updated: 03/25/24 1100    Narrative:      The following orders were created for panel order [place] Draw.  Procedure                               Abnormality         Status                     ---------                               -----------         ------                     Green Top (Gel)[[id number]]                                  Final result               Lavender Top[[id number]]                                     Final result               Gold Top - SST[[id number]]                                   Final result                Light Blue Top[[id number]]                                   Final result                 Please view results for these tests on the individual orders.    Green Top (Gel) [[id number]] Collected: 03/25/24 0950    Specimen: Blood Updated: 03/25/24 1100     Extra Tube Hold for add-ons.     Comment: Auto resulted.       Lavender Top [[id number]] Collected: 03/25/24 0950    Specimen: Blood Updated: 03/25/24 1100     Extra Tube hold for add-on     Comment: Auto resulted       Gold Top - SST [[id number]] Collected: 03/25/24 0950    Specimen: Blood Updated: 03/25/24 1100     Extra Tube Hold for add-ons.     Comment: Auto resulted.       Light Blue Top [[id number]] Collected: 03/25/24 0950    Specimen: Blood Updated: 03/25/24 1100     Extra Tube Hold for add-ons.     Comment: Auto resulted       Urinalysis, Microscopic Only - Urine, Clean Catch [[id number]]  (Abnormal) Collected: 03/25/24 1024    Specimen: Urine, Clean Catch Updated: 03/25/24 1040     RBC, UA 0-2 /HPF      WBC, UA 3-5 /HPF      Bacteria, UA 4+ /HPF      Squamous Epithelial Cells, UA 3-6 /HPF      Hyaline Casts, UA None Seen /LPF      Methodology Manual Light Microscopy    Urinalysis With Microscopic If Indicated (No Culture) - Urine, Clean Catch [[id number]]  (Abnormal) Collected: 03/25/24 1024    Specimen: Urine, Clean Catch Updated: 03/25/24 1032     Color, UA Yellow     Appearance, UA Cloudy     pH, UA <=5.0     Specific Gravity, UA >=1.030     Glucose, UA >=1000 mg/dL (3+)     Ketones, UA Trace     Bilirubin, UA Negative     Blood, UA Negative     Protein, UA Negative     Leuk Esterase, UA Negative     Nitrite, UA Positive     Urobilinogen, UA 0.2 E.U./dL    Comprehensive Metabolic Panel [[id number]]  (Abnormal) Collected: 03/25/24 0950    Specimen: Blood Updated: 03/25/24 1018     Glucose 179 mg/dL      BUN 11 mg/dL      Creatinine 0.67 mg/dL      Sodium 139 mmol/L      Potassium 4.2 mmol/L      Comment: Slight hemolysis detected by analyzer. Result  may be falsely elevated.        Chloride 101 mmol/L      CO2 23.4 mmol/L      Calcium 9.4 mg/dL      Total Protein 6.4 g/dL      Albumin 4.1 g/dL      ALT (SGPT) 14 U/L      AST (SGOT) 19 U/L      Comment: Slight hemolysis detected by analyzer. Result may be falsely elevated.        Alkaline Phosphatase 41 U/L      Total Bilirubin 0.6 mg/dL      Globulin 2.3 gm/dL      A/G Ratio 1.8 g/dL      BUN/Creatinine Ratio 16.4     Anion Gap 14.6 mmol/L      eGFR 95.9 mL/min/1.73     Narrative:      GFR Normal >60  Chronic Kidney Disease <60  Kidney Failure <15      Lipase [[id number]]  (Normal) Collected: 03/25/24 0950    Specimen: Blood Updated: 03/25/24 1016     Lipase 30 U/L     Lactic Acid, Plasma [[id number]]  (Normal) Collected: 03/25/24 0950    Specimen: Blood Updated: 03/25/24 1012     Lactate 1.6 mmol/L     CBC & Differential [[id number]]  (Abnormal) Collected: 03/25/24 0950    Specimen: Blood Updated: 03/25/24 0958    Narrative:      The following orders were created for panel order CBC & Differential.  Procedure                               Abnormality         Status                     ---------                               -----------         ------                     CBC Auto Differential[[id number]]        Abnormal            Final result                 Please view results for these tests on the individual orders.    CBC Auto Differential [[id number]]  (Abnormal) Collected: 03/25/24 0950    Specimen: Blood Updated: 03/25/24 0958     WBC 11.49 10*3/mm3      RBC 5.36 10*6/mm3      Hemoglobin 17.0 g/dL      Hematocrit 48.1 %      MCV 89.7 fL      MCH 31.7 pg      MCHC 35.3 g/dL      RDW 13.0 %      RDW-SD 42.4 fl      MPV 10.0 fL      Platelets 195 10*3/mm3      Neutrophil % 86.1 %      Lymphocyte % 8.5 %      Monocyte % 3.7 %      Eosinophil % 0.4 %      Basophil % 0.4 %      Immature Grans % 0.9 %      Neutrophils, Absolute 9.89 10*3/mm3      Lymphocytes, Absolute 0.98 10*3/mm3      Monocytes, Absolute 0.42  10*3/mm3      Eosinophils, Absolute 0.05 10*3/mm3      Basophils, Absolute 0.05 10*3/mm3      Immature Grans, Absolute 0.10 10*3/mm3      nRBC 0.0 /100 WBC             Radiology:    Imaging Results (Last 72 Hours)       Procedure Component Value Units Date/Time    US Gallbladder [[id number]] Collected: 03/25/24 1042     Updated: 03/25/24 1117    Narrative:      PROCEDURE: US GALLBLADDER-     HISTORY: eval cholecystitis     PROCEDURE: Ultrasound images of the right upper quadrant were obtained.     FINDINGS:  The liver parenchyma is normal in echogenicity. The  gallbladder is distended and filled with sludge and stones. There is no  gallbladder wall thickening. There is a small amount of pericholecystic  fluid. Findings are suggestive of acute cholecystitis. The common duct  is normal measuring 5.6 mm.       Impression:      Distended gallbladder filled with stones and sludge with  small amount of pericholecystic fluid. Findings are suggestive of acute  cholecystitis.                 Images were reviewed, interpreted, and dictated by Dr. Janessa Mireles MD  Transcribed by Polina Carr PA-C.     This report was signed and finalized on 3/25/2024 11:15 AM by Janessa Mireles MD.               Assessment:    Acute cholecystitis    Plan:     I did have a detailed and extensive discussion with the patient in the hospital today and they understand that they need to undergo robotic assisted laparoscopic cholecystectomy with ICG or possible open cholecystectomy. Full risks and benefits of operative versus nonoperative intervention were discussed with the patient and these included things such as nonresolution of symptoms and possible worsening of symptoms without surgical intervention versus infection, bleeding, open cholecystectomy, common bile duct injury, postoperative biliary leakage, need for drain placement, possible inability to perform cholangiography due to inflammation, postoperative abscess, etc with surgical  intervention. The patient understands, agrees, and wishes to proceed with the surgical treatment plan as mentioned above. The patient had no questions for me at the end of the discussion.      I discussed the patients findings and my recommendations with patient.    Berta Clifton DO  03/25/24  16:32 EDT

## 2024-03-25 NOTE — ANESTHESIA PREPROCEDURE EVALUATION
Anesthesia Evaluation     Patient summary reviewed and Nursing notes reviewed   NPO Solid Status: > 8 hours  NPO Liquid Status: > 8 hours           Airway   Mallampati: II  TM distance: >3 FB  Neck ROM: full  Possible difficult intubation  Dental - normal exam     Pulmonary     breath sounds clear to auscultation  (+) pneumonia resolved ,sleep apnea  Cardiovascular     Rhythm: regular  Rate: normal    (+) hypertension, hyperlipidemia      Neuro/Psych  (+) psychiatric history  GI/Hepatic/Renal/Endo    (+) obesity, diabetes mellitus type 2 poorly controlled using insulin    Musculoskeletal     (+) myalgias  Abdominal   (+) obese   Substance History      OB/GYN          Other   arthritis,                   Anesthesia Plan    ASA 3 - emergent     general     intravenous induction     Anesthetic plan, risks, benefits, and alternatives have been provided, discussed and informed consent has been obtained with: patient.  Pre-procedure education provided  Plan discussed with CRNA.      CODE STATUS:

## 2024-03-25 NOTE — ANESTHESIA PROCEDURE NOTES
Airway  Urgency: elective    Date/Time: 3/25/2024 4:53 PM  Airway not difficult    General Information and Staff    Patient location during procedure: OR  CRNA/CAA: Luis Alvarenga CRNA    Indications and Patient Condition  Indications for airway management: airway protection    Preoxygenated: yes  Mask difficulty assessment: 2 - vent by mask + OA or adjuvant +/- NMBA    Final Airway Details  Final airway type: endotracheal airway      Successful airway: ETT  Cuffed: yes   Successful intubation technique: direct laryngoscopy  Facilitating devices/methods: anterior pressure/BURP  Endotracheal tube insertion site: oral  Blade: [place]  Blade size: 2  ETT size (mm): 7.0  Cormack-Lehane Classification: grade IIb - view of arytenoids or posterior of glottis only  Placement verified by: chest auscultation and capnometry   Measured from: teeth  ETT/EBT  to teeth (cm): 21  Number of attempts at approach: 1  Assessment: lips, teeth, and gum same as pre-op and atraumatic intubation

## 2024-03-25 NOTE — ED PROVIDER NOTES
[facility] OR  Emergency Department Encounter  Emergency Medicine Physician Note     Pt Name:Mercy Clemens  MRN: [id number]  Birthdate 1955  Date of evaluation: 3/25/2024  PCP:  Carolina Soto APRN  Note Started: 10:08 AM EDT      CHIEF COMPLAINT       Chief Complaint   Patient presents with    Abdominal Pain    Back Pain       HISTORY OF PRESENT ILLNESS  (Location/Symptom, Timing/Onset, Context/Setting, Quality, Duration, Modifying Factors, Severity.)      Mercy Clemens is a 68 y.o. female who presents with abdominal pain that radiates around the flank.  Patient describes it on the right side.  Patient is never experienced this before, states it started yesterday.  Patient unable to identify if it truly happens with eating.  Patient unable to identify when it truly happens.  Patient has history of appendectomy, also describes tubal ligation.  Patient denies any fevers chills nausea or vomiting.  Patient denies any diarrhea constipation, change in urination.    PAST MEDICAL / SURGICAL / SOCIAL / FAMILY HISTORY     Past Medical History:   Diagnosis Date    Abdominal bloating     Abdominal pain     Allergic 1957    Penicillin    Arthritis     Knees hips    BMI 34.0-34.9,adult     Cataract     Mini cataracts    Diabetes mellitus     Diarrhea     Diverticulosis     Noted in colon test    Encounter for long-term (current) use of medications     Fatigue     Fibromyalgia, primary 2011    Entire body    Hyperlipidemia     Hypertension     Low back pain     Detiorated disk    Marked eosinophilia     Myalgia and myositis     Nausea     Obesity     Weight at 235 at one time    Osteopenia     Noted in test    Pneumonia     Have had one occurrance of pneumonia    Reaction to chronic stress     RUQ abdominal pain     Urinary tract infection     Uti have had many    Viral gastroenteritis      No additional pertinent       Past Surgical History:   Procedure Laterality Date    APPENDECTOMY      Taken  out    BILATERAL OOPHORECTOMY      BLADDER SURGERY      Bladder tack : Mckay Alston procedure    BREAST BIOPSY      COLONOSCOPY      march 2015    EYE SURGERY      To straighten pulled eyes    HYSTERECTOMY      OOPHORECTOMY       No additional pertinent       Social History     Socioeconomic History    Marital status:    Tobacco Use    Smoking status: Never    Smokeless tobacco: Never   Vaping Use    Vaping status: Never Used   Substance and Sexual Activity    Alcohol use: No    Drug use: No    Sexual activity: Yes     Partners: Male     Comment: Hysterectomy       Family History   Problem Relation Age of Onset    Arthritis Mother     Hypertension Mother     Stroke Mother         small stroke    Hyperlipidemia Mother     Cancer Father         lung cancer    Lung cancer Father     No Known Problems Sister     No Known Problems Sister     Lymphoma Maternal Uncle     Cancer Maternal Uncle     Lung cancer Maternal Uncle     Cancer Paternal Uncle     Lung cancer Paternal Uncle     Breast cancer Neg Hx        Allergies:  Penicillins    Home Medications:  Prior to Admission medications    Medication Sig Start Date End Date Taking? Authorizing Provider   Alcohol Swabs (B-D SINGLE USE SWABS REGULAR) pads Use 3 times a day with insulin injections. 7/28/21   Carolina Soto APRN   alendronate (FOSAMAX) 70 MG tablet  8/11/20   Provider, MD Geo   Blood Glucose Monitoring Suppl w/Device kit Check tid E11.9 wants accu-check guide meter 7/23/21   Carolina Soto APRN   Continuous Blood Gluc  (FreeStyle James 2 [place]) device 1 Device Every 14 (Fourteen) Days. 9/29/22   Carolina Soto APRN   Continuous Blood Gluc Sensor (FreeStyle James 2 Sensor) misc 1 Device Every 14 (Fourteen) Days. 9/29/22   Carolina Soto APRN   Diclofenac Sodium (VOLTAREN) 1 % gel gel APPLY 2 GRAMS TO JOINTS OF UPPER EXTREMITY AND 4 GRAMS TO JOINTS OF LOWER EXTREMITY FOUR TIMES DAILY 8/12/21   Provider,  MD Geo   DULoxetine (CYMBALTA) 60 MG capsule  9/14/22   Provider, MD Geo   empagliflozin (Jardiance) 10 MG tablet tablet Take 1 tablet by mouth Daily. 5/16/23   Carolina Soto APRN   glucose blood (Accu-Chek Guide) test strip TEST BLOOD SUGAR THREE TIMES DAILY 2/1/24   Carolina Soto APRN    MG tablet TAKE 1 TABLET BY MOUTH EVERY 8 (EIGHT) HOURS AS NEEDED FOR MODERATE PAIN . 5/23/22   Carolina Soto APRN   insulin aspart (NovoLOG FlexPen) 100 UNIT/ML solution pen-injector sc pen Patient uses 20 units with each meal. 20 units 4 times daily. 4/14/23   Carolina Soto APRN   Insulin Pen Needle (B-D UF III MINI PEN NEEDLES) 31G X 5 MM misc USE ONE PEN NEEDLE TO GIVE INSULIN SHOTS FOUR TIMES A DAY 4/20/23   Carolina Soto APRN   Jardiance 10 MG tablet tablet TAKE 1 TABLET EVERY DAY 5/16/23   Carolina Soto APRN   Lancets misc Check three times daily 7/18/18   Carolina Soto APRN   losartan-hydrochlorothiazide (HYZAAR) 100-25 MG per tablet TAKE 1 TABLET EVERY DAY 2/28/24   Carolina Soto APRN   meloxicam (MOBIC) 15 MG tablet Take 1 tablet by mouth Daily. 10/28/21   Carolina Soto APRN   metoprolol succinate XL (TOPROL-XL) 25 MG 24 hr tablet TAKE 1 TABLET EVERY DAY 11/27/23   Carolina Soto APRN   pregabalin (LYRICA) 150 MG capsule TAKE 1 CAPSULE TWICE DAILY 10/11/23   Carolina Soto APRN   Tirzepatide (Mounjaro) 5 MG/0.5ML solution pen-injector pen Inject 0.5 mL under the skin into the appropriate area as directed 1 (One) Time Per Week. 2/7/24   Carolina Soto APRN         REVIEW OF SYSTEMS       Review of Systems   Constitutional:  Negative for chills and fever.   Respiratory:  Negative for shortness of breath.    Cardiovascular:  Negative for chest pain.   Gastrointestinal:  Positive for abdominal pain. Negative for nausea and vomiting.   Endocrine: Negative for polyuria.   Genitourinary:  Negative for difficulty urinating and  "dysuria.       PHYSICAL EXAM      INITIAL VITALS:   /70   Pulse 62   Temp 97.2 °F (36.2 °C) (Temporal)   Resp 16   Ht 157.5 cm (62\")   Wt 77.1 kg (170 lb)   SpO2 98%   BMI 31.09 kg/m²     Physical Exam  Constitutional:       Appearance: Normal appearance.   HENT:      Head: Normocephalic and atraumatic.      Mouth/Throat:      Mouth: Mucous membranes are moist.      Pharynx: Oropharynx is clear.   Cardiovascular:      Rate and Rhythm: Normal rate and regular rhythm.      Pulses: Normal pulses.   Pulmonary:      Effort: Pulmonary effort is normal.      Breath sounds: Normal breath sounds.   Abdominal:      General: Abdomen is flat. There is no distension.      Palpations: Abdomen is soft.      Tenderness: There is abdominal tenderness in the right upper quadrant and epigastric area. There is no guarding. Positive signs include Estrada's sign.   Musculoskeletal:         General: Normal range of motion.   Skin:     General: Skin is warm and dry.   Neurological:      General: No focal deficit present.      Mental Status: She is alert and oriented to person, place, and time.   Psychiatric:         Mood and Affect: Mood normal.         Behavior: Behavior normal.           DDX/DIAGNOSTIC RESULTS / EMERGENCY DEPARTMENT COURSE / MDM     Differential Diagnosis included but not limited: Pyelonephritis, acute cystitis, cholecystitis.    Diagnoses Considered but Do Not Suspect: Peritoneal abdomen,    Decision Rules/Scores utilized: N/A    Tests considered but not ordered and why: N/A    MIPS: N/A     Code Status Discussion:  Not Discussed    Additional Patient Education Provided: None     Medical Decision Making    Medical Decision Making  Patient presented right flank sided flank pain, plan to obtain urine, will obtain general laboratory evaluation.  Patient does have positive Estrada sign, plan for ultrasound of the right upper quadrant.  Patient with no CVA tenderness, low concern for pyelonephritis.    Ultrasound " demonstrated concerns for cholecystitis.  Patient nonseptic appearing, no severe infections.  Patient was given ceftriaxone and metronidazole for intra-abdominal pathogens.  This will help with patient's acute cystitis and preoperative antibiotic management for cholecystitis.  Patient's case discussed with surgery.  Plan to admit have patient evaluated by surgery and go to the OR today.  Patient agreeable to plan.  Patient taken to the OR from the emergency department.    Amount and/or Complexity of Data Reviewed  Labs: ordered.  Radiology: ordered.    Risk  Prescription drug management.        EKG  None Performed     All EKG's are interpreted by the Emergency Department Physician who either signs or Co-signs this chart in the absence of a cardiologist.    Additional Scores                   EMERGENCY DEPARTMENT COURSE:    ED Course as of 03/25/24 1528   Mon Mar 25, 2024   1117 Discussed patient's case with surgery, they will recommend doing a procedure this afternoon.  Did not request admission, request holding in the emerged part for an operating case here in the next couple hours. [CR]   1118 Patient started on ceftriaxone and Flagyl for preop and for UTI. [CR]   1356 Patient still waiting for the OR at this time.  Plan to have patient go to the OR directly from the ER. [CR]      ED Course User Index  [CR] Jaiden Alanis, DO       PROCEDURES:  None Performed   Procedures    DATA FOR LAB AND RADIOLOGY TESTS ORDERED BELOW ARE REVIEWED BY THE ED CLINICIAN:    RADIOLOGY: All x-rays, CT, MRI, and formal ultrasound images (except ED bedside ultrasound) are read by the radiologist, see reports below, unless otherwise noted in MDM or here.  Reports below are reviewed by myself.  US Gallbladder   Final Result   Distended gallbladder filled with stones and sludge with   small amount of pericholecystic fluid. Findings are suggestive of acute   cholecystitis.                       Images were reviewed,  interpreted, and dictated by Dr. Janessa Mireles MD   Transcribed by Polina Carr PA-C.       This report was signed and finalized on 3/25/2024 11:15 AM by Janessa Mireles MD.              LABS: Lab orders shown below, the results are reviewed by myself, and all abnormals are listed below.  Labs Reviewed   COMPREHENSIVE METABOLIC PANEL - Abnormal; Notable for the following components:       Result Value    Glucose 179 (*)     All other components within normal limits    Narrative:     GFR Normal >60  Chronic Kidney Disease <60  Kidney Failure <15     URINALYSIS W/ MICROSCOPIC IF INDICATED (NO CULTURE) - Abnormal; Notable for the following components:    Appearance, UA Cloudy (*)     Glucose, UA >=1000 mg/dL (3+) (*)     Ketones, UA Trace (*)     Nitrite, UA Positive (*)     All other components within normal limits   CBC WITH AUTO DIFFERENTIAL - Abnormal; Notable for the following components:    WBC 11.49 (*)     RBC 5.36 (*)     Hemoglobin 17.0 (*)     Hematocrit 48.1 (*)     Neutrophil % 86.1 (*)     Lymphocyte % 8.5 (*)     Monocyte % 3.7 (*)     Immature Grans % 0.9 (*)     Neutrophils, Absolute 9.89 (*)     Immature Grans, Absolute 0.10 (*)     All other components within normal limits   URINALYSIS, MICROSCOPIC ONLY - Abnormal; Notable for the following components:    WBC, UA 3-5 (*)     Bacteria, UA 4+ (*)     Squamous Epithelial Cells, UA 3-6 (*)     All other components within normal limits   LIPASE - Normal   LACTIC ACID, PLASMA - Normal   PROTIME-INR - Normal    Narrative:     Suggested INR therapeutic range for stable oral anticoagulant therapy:    Low Intensity therapy:   1.5-2.0  Moderate Intensity therapy:   2.0-3.0  High Intensity therapy:   2.5-4.0   APTT - Normal   RAINBOW DRAW    Narrative:     The following orders were created for panel order [place] Draw.  Procedure                               Abnormality         Status                     ---------                               -----------          ------                     Green Top (Gel)[[id number]]                                  Final result               Lavender Top[[id number]]                                     Final result               Gold Top - SST[[id number]]                                   Final result               Light Blue Top[[id number]]                                   Final result                 Please view results for these tests on the individual orders.   CBC AND DIFFERENTIAL    Narrative:     The following orders were created for panel order CBC & Differential.  Procedure                               Abnormality         Status                     ---------                               -----------         ------                     CBC Auto Differential[[id number]]        Abnormal            Final result                 Please view results for these tests on the individual orders.   GREEN TOP   LAVENDER TOP   GOLD TOP - SST   LIGHT BLUE TOP       Vitals Reviewed:    Vitals:    03/25/24 1002 03/25/24 1047 03/25/24 1346 03/25/24 1509   BP:  140/61 136/60 117/70   BP Location:       Patient Position:       Pulse:   57 62   Resp:    16   Temp: 98.4 °F (36.9 °C)   97.2 °F (36.2 °C)   TempSrc: Axillary   Temporal   SpO2:   97% 98%   Weight:       Height:           MEDICATIONS GIVEN TO PATIENT THIS ENCOUNTER:  Medications   sodium chloride 0.9 % flush 10 mL ( Intravenous MAR Hold 3/25/24 1507)   sodium chloride 0.9 % flush 10 mL (has no administration in time range)   sodium chloride 0.9 % flush 10 mL (has no administration in time range)   sodium chloride 0.9 % infusion 40 mL (has no administration in time range)   lactated ringers infusion (has no administration in time range)   heparin (porcine) 5000 UNIT/ML injection 5,000 Units (has no administration in time range)   indocyanine green (IC-GREEN) injection 2.5 mg (has no administration in time range)   ketorolac (TORADOL) injection 15 mg (15 mg Intravenous Given 3/25/24 0952)    cefTRIAXone (ROCEPHIN) 2,000 mg in sodium chloride 0.9 % 100 mL IVPB (0 mg Intravenous Stopped 3/25/24 1202)   metroNIDAZOLE (FLAGYL) IVPB 500 mg (0 mg Intravenous Stopped 3/25/24 1202)   morphine injection 4 mg (4 mg Intravenous Given 3/25/24 1154)   morphine injection 4 mg (4 mg Intravenous Given 3/25/24 1333)       CONSULTS:  None    CRITICAL CARE:  There was significant risk of life threatening deterioration of patient's condition requiring my direct management. Critical care time 0 minutes, excluding any documented procedures.    FINAL IMPRESSION      1. Acute cholecystitis    2. Acute cystitis without hematuria          DISPOSITION / PLAN     ED Disposition       ED Disposition   Send to OR    Condition   --    Comment   --               PATIENT REFERRED TO:  No follow-up provider specified.    DISCHARGE MEDICATIONS:     Medication List        CONTINUE taking these medications      B-D SINGLE USE SWABS REGULAR pads  Use 3 times a day with insulin injections.     B-D UF III MINI PEN NEEDLES 31G X 5 MM misc  Generic drug: Insulin Pen Needle  USE ONE PEN NEEDLE TO GIVE INSULIN SHOTS FOUR TIMES A DAY     Blood Glucose Monitoring Suppl w/Device kit  Check tid E11.9 wants accu-check guide meter     FreeStyle James 2 [place] device  1 Device Every 14 (Fourteen) Days.     FreeStyle James 2 Sensor misc  1 Device Every 14 (Fourteen) Days.     Lancets misc  Check three times daily            ASK your doctor about these medications      Accu-Chek Guide test strip  Generic drug: glucose blood  TEST BLOOD SUGAR THREE TIMES DAILY     alendronate 70 MG tablet  Commonly known as: FOSAMAX     Diclofenac Sodium 1 % gel gel  Commonly known as: VOLTAREN     DULoxetine 60 MG capsule  Commonly known as: CYMBALTA      MG tablet  Generic drug: ibuprofen  TAKE 1 TABLET BY MOUTH EVERY 8 (EIGHT) HOURS AS NEEDED FOR MODERATE PAIN .     * Jardiance 10 MG tablet tablet  Generic drug: empagliflozin  TAKE 1 TABLET EVERY DAY     *  empagliflozin 10 MG tablet tablet  Commonly known as: Jardiance  Take 1 tablet by mouth Daily.     losartan-hydrochlorothiazide 100-25 MG per tablet  Commonly known as: HYZAAR  TAKE 1 TABLET EVERY DAY     meloxicam 15 MG tablet  Commonly known as: MOBIC  Take 1 tablet by mouth Daily.     metoprolol succinate XL 25 MG 24 hr tablet  Commonly known as: TOPROL-XL  TAKE 1 TABLET EVERY DAY     NovoLOG FlexPen 100 UNIT/ML solution pen-injector sc pen  Generic drug: insulin aspart  Patient uses 20 units with each meal. 20 units 4 times daily.     pregabalin 150 MG capsule  Commonly known as: LYRICA  TAKE 1 CAPSULE TWICE DAILY     Tirzepatide 5 MG/0.5ML solution pen-injector pen  Commonly known as: Mounjaro  Inject 0.5 mL under the skin into the appropriate area as directed 1 (One) Time Per Week.           * This list has 2 medication(s) that are the same as other medications prescribed for you. Read the directions carefully, and ask your doctor or other care provider to review them with you.                  Electronically signed by Jaiden Alanis DO, 03/25/24, 10:08 AM EDT.    Emergency Medicine Physician  [facility]  (Please note that portions of thisnote were completed with a voice recognition program.  Efforts were made to edit the dictations but occasionally words are mis-transcribed.)      Jaiden Alanis DO  03/25/24 0160

## 2024-03-25 NOTE — H&P
[facility] HOSPITALIST   HISTORY AND PHYSICAL      Name:  Mercy Clemens   Age:  68 y.o.  Sex:  female  :  1955  MRN:  [id number]   Visit Number:  [id number]  Admission Date:  3/25/2024  Date Of Service:  24  Primary Care Physician:  Carolina Soto APRN    Chief Complaint:     Abdominal pain    History Of Presenting Illness:      Mercy Clemens is a 68-year-old woman with past medical history of type 2 diabetes, hypertension, chronic pain fibromyalgia, hyperlipidemia, osteopenia.  Presented to [facility] ED on 3/25/2024 with concern for abdominal pain radiates around right flank onset prior to presentation.  Was not able to characterize if it happens with eating.  Denies any fevers or chills, vomiting or diarrhea, constipation, change in urination, shortness of air, chest pain.    ED summary: Afebrile, vital signs stable on room air.  Blood glucose 179, otherwise CMP unremarkable.  Lactate not elevated, lipase not elevated.  White count 11.  Hemoglobin 17.  Pattern of infection on urinalysis.  Ultrasound gallbladder distended gallbladder filled with stones and sludge with small amount of pericholecystic fluid, suggestive of acute cholecystitis.  She was provided Rocephin, Flagyl, Toradol, morphine.  Brought to the ED by general surgery-Dr. Clifton for laparoscopic cholecystectomy.  Gallbladder noted to be gangrenous, edematous, acutely inflamed.  Bile spillage occurred, copiously irrigated and suctioned.  Hospitalist was contacted by Dr. Clifton requesting observation admission given gangrenous nature of gallbladder with bile spillage, check labs in the morning and possible discharge if patient is doing well.    Review Of Systems:    All systems were reviewed and negative except as mentioned in history of presenting illness, assessment and plan.    Past Medical History: Patient  has a past medical history of Abdominal bloating, Abdominal pain, Allergic (1956), Arthritis, BMI 34.0-34.9,adult,  Cataract, Diabetes mellitus, Diarrhea, Diverticulosis, Encounter for long-term (current) use of medications, Fatigue, Fibromyalgia, primary (2011), Hyperlipidemia, Hypertension, Low back pain, Marked eosinophilia, Myalgia and myositis, Nausea, Obesity, Osteopenia, Pneumonia, Reaction to chronic stress, RUQ abdominal pain, Urinary tract infection, and Viral gastroenteritis.    Past Surgical History: Patient  has a past surgical history that includes Bladder surgery; Colonoscopy; Hysterectomy; Bilateral oophorectomy; Breast biopsy; Appendectomy; Eye surgery; and Oophorectomy.    Social History: Patient  reports that she has never smoked. She has never used smokeless tobacco. She reports that she does not drink alcohol and does not use drugs.    Family History:  Patient's family history has been reviewed and found to be noncontributory.     Allergies:      Penicillins    Home Medications:    Prior to Admission Medications       Prescriptions Last Dose Informant Patient Reported? Taking?    alendronate (FOSAMAX) 70 MG tablet 3/24/2024  Yes Yes    Diclofenac Sodium (VOLTAREN) 1 % gel gel 3/24/2024  Yes Yes    APPLY 2 GRAMS TO JOINTS OF UPPER EXTREMITY AND 4 GRAMS TO JOINTS OF LOWER EXTREMITY FOUR TIMES DAILY    DULoxetine (CYMBALTA) 60 MG capsule 3/24/2024  Yes Yes    empagliflozin (Jardiance) 10 MG tablet tablet 3/24/2024  No Yes    Take 1 tablet by mouth Daily.     MG tablet 3/24/2024  No Yes    TAKE 1 TABLET BY MOUTH EVERY 8 (EIGHT) HOURS AS NEEDED FOR MODERATE PAIN .    insulin aspart (NovoLOG FlexPen) 100 UNIT/ML solution pen-injector sc pen 3/24/2024  No Yes    Patient uses 20 units with each meal. 20 units 4 times daily.    Jardiance 10 MG tablet tablet 3/24/2024  No Yes    TAKE 1 TABLET EVERY DAY    losartan-hydrochlorothiazide (HYZAAR) 100-25 MG per tablet 3/24/2024  No Yes    TAKE 1 TABLET EVERY DAY    meloxicam (MOBIC) 15 MG tablet 3/24/2024  No Yes    Take 1 tablet by mouth Daily.    metoprolol  succinate XL (TOPROL-XL) 25 MG 24 hr tablet 3/24/2024  No Yes    TAKE 1 TABLET EVERY DAY    pregabalin (LYRICA) 150 MG capsule 3/24/2024  No Yes    TAKE 1 CAPSULE TWICE DAILY    Alcohol Swabs (B-D SINGLE USE SWABS REGULAR) pads Unknown  No No    Use 3 times a day with insulin injections.    Blood Glucose Monitoring Suppl w/Device kit Unknown  No No    Check tid E11.9 wants accu-check guide meter    Continuous Blood Gluc  (FreeStyle James 2 [place]) device Unknown  No No    1 Device Every 14 (Fourteen) Days.    Continuous Blood Gluc Sensor (FreeStyle James 2 Sensor) misc Unknown  No No    1 Device Every 14 (Fourteen) Days.    glucose blood (Accu-Chek Guide) test strip Unknown  No No    TEST BLOOD SUGAR THREE TIMES DAILY    Insulin Pen Needle (B-D UF III MINI PEN NEEDLES) 31G X 5 MM misc Unknown  No No    USE ONE PEN NEEDLE TO GIVE INSULIN SHOTS FOUR TIMES A DAY    Lancets misc Unknown  No No    Check three times daily    Tirzepatide (Mounjaro) 5 MG/0.5ML solution pen-injector pen 3/18/2024  No No    Inject 0.5 mL under the skin into the appropriate area as directed 1 (One) Time Per Week.          ED Medications:    Medications   sodium chloride 0.9 % flush 10 mL ( Intravenous MAR Hold 3/25/24 1507)   lactated ringers infusion ( Intravenous Anesthesia Volume Adjustment 3/25/24 1855)   lidocaine (XYLOCAINE) 1 % injection (9 mL Infiltration Given 3/25/24 1722)   sodium chloride 0.9 % solution (1,000 mL Irrigation Given 3/25/24 1722)   HYDROmorphone (DILAUDID) injection 0.5 mg (has no administration in time range)   HYDROmorphone (DILAUDID) injection 0.25 mg (has no administration in time range)   ondansetron (ZOFRAN) injection 4 mg (has no administration in time range)   ketorolac (TORADOL) injection 15 mg (15 mg Intravenous Given 3/25/24 3965)   cefTRIAXone (ROCEPHIN) 2,000 mg in sodium chloride 0.9 % 100 mL IVPB (0 mg Intravenous Stopped 3/25/24 1202)   metroNIDAZOLE (FLAGYL) IVPB 500 mg (0 mg Intravenous  "Stopped 3/25/24 1202)   morphine injection 4 mg (4 mg Intravenous Given 3/25/24 1154)   heparin (porcine) 5000 UNIT/ML injection 5,000 Units (5,000 Units Subcutaneous Given 3/25/24 1627)   indocyanine green (IC-GREEN) injection 2.5 mg (2.5 mg Intravenous Given 3/25/24 1627)   morphine injection 4 mg (4 mg Intravenous Given 3/25/24 1333)   HYDROmorphone (DILAUDID) injection 0.5 mg (0.5 mg Intravenous Given 3/25/24 1620)     Vital Signs:  Temp:  [97.2 °F (36.2 °C)-98.4 °F (36.9 °C)] 97.2 °F (36.2 °C)  Heart Rate:  [54-62] 62  Resp:  [16-18] 16  BP: (117-140)/(60-70) 117/70        03/25/24  0914   Weight: 77.1 kg (170 lb)     Body mass index is 31.09 kg/m².    Physical Exam:     Most recent vital Signs: /70   Pulse 62   Temp 97.2 °F (36.2 °C) (Temporal)   Resp 16   Ht 157.5 cm (62\")   Wt 77.1 kg (170 lb)   SpO2 98%   BMI 31.09 kg/m²     Physical Exam  Constitutional:       General: She is not in acute distress.     Appearance: She is obese. She is ill-appearing. She is not toxic-appearing.   HENT:      Mouth/Throat:      Mouth: Mucous membranes are moist.   Eyes:      Extraocular Movements: Extraocular movements intact.   Cardiovascular:      Rate and Rhythm: Normal rate and regular rhythm.      Pulses: Normal pulses.      Heart sounds: Normal heart sounds.   Pulmonary:      Effort: Pulmonary effort is normal.      Breath sounds: Normal breath sounds.   Abdominal:      Palpations: Abdomen is soft.      Tenderness: There is no abdominal tenderness.   Musculoskeletal:      Right lower leg: No edema.      Left lower leg: No edema.   Skin:     General: Skin is warm.      Capillary Refill: Capillary refill takes less than 2 seconds.   Neurological:      General: No focal deficit present.      Mental Status: She is alert and oriented to person, place, and time.   Psychiatric:         Mood and Affect: Mood normal.         Thought Content: Thought content normal.         Laboratory data:    I have reviewed the " labs done in the emergency room.    Results from last 7 days   Lab Units 03/25/24  0950 03/22/24  1043   SODIUM mmol/L 139 145   POTASSIUM mmol/L 4.2 3.8   CHLORIDE mmol/L 101 105   CO2 mmol/L 23.4 30.0*   BUN mg/dL 11 14   CREATININE mg/dL 0.67 1.02*   CALCIUM mg/dL 9.4 9.6   BILIRUBIN mg/dL 0.6  --    ALK PHOS U/L 41  --    ALT (SGPT) U/L 14  --    AST (SGOT) U/L 19  --    GLUCOSE mg/dL 179* 148*     Results from last 7 days   Lab Units 03/25/24  0950   WBC 10*3/mm3 11.49*   HEMOGLOBIN g/dL 17.0*   HEMATOCRIT % 48.1*   PLATELETS 10*3/mm3 195     Results from last 7 days   Lab Units 03/25/24  0950   INR  0.97                 Results from last 7 days   Lab Units 03/25/24  0950   LIPASE U/L 30         Results from last 7 days   Lab Units 03/25/24  1024   COLOR UA  Yellow   GLUCOSE UA  >=1000 mg/dL (3+)*   KETONES UA  Trace*   BLOOD UA  Negative   LEUKOCYTES UA  Negative   PH, URINE  <=5.0   BILIRUBIN UA  Negative   UROBILINOGEN UA  0.2 E.U./dL   RBC UA /HPF 0-2   WBC UA /HPF 3-5*       Pain Management Panel  More data exists         Latest Ref Rng & Units 12/22/2023 4/27/2023   Pain Management Panel   Creatinine, Urine mg/dL 41.2  200        EKG:      none    Radiology:    US Gallbladder    Result Date: 3/25/2024  PROCEDURE: US GALLBLADDER-  HISTORY: eval cholecystitis  PROCEDURE: Ultrasound images of the right upper quadrant were obtained.  FINDINGS:  The liver parenchyma is normal in echogenicity. The gallbladder is distended and filled with sludge and stones. There is no gallbladder wall thickening. There is a small amount of pericholecystic fluid. Findings are suggestive of acute cholecystitis. The common duct is normal measuring 5.6 mm.      Distended gallbladder filled with stones and sludge with small amount of pericholecystic fluid. Findings are suggestive of acute cholecystitis.      Images were reviewed, interpreted, and dictated by Dr. Janessa Mireles MD Transcribed by Polina Carr PA-C.  This report was  signed and finalized on 3/25/2024 11:15 AM by Janessa Mireles MD.       Assessment/Plan:    Observation general floor admission 3/25/2024 status post laparoscopic cholecystectomy by general surgery-Dr. Clifton. Gallbladder noted to be gangrenous, edematous, acutely inflamed.  Bile spillage occurred, copiously irrigated and suctioned.  Hospitalist was contacted by Dr. Clifton requesting observation admission given gangrenous nature of gallbladder with bile spillage, check labs in the morning and possible discharge if patient is doing well.    At time of admission resting in bed, pleasantly conversational.  Just appears tired.     updated at bedside.    Status postcholecystectomy  UTI  Rocephin and Flagyl.  Morning labs.  If doing well in the morning, able to discharge with GI follow-up.  Follow-up with general surgery outpatient.    Chronic:  type 2 diabetes, hypertension, chronic pain fibromyalgia, hyperlipidemia, osteopenia    Subcutaneous insulin protocol.  Continue other home medications.    Risk Assessment: High  DVT Prophylaxis: SCDs  Code Status: Full code  Diet: Cardiac/diabetic/low-fat    Advance Care Planning   ACP discussion was held with the patient during this visit. Patient does not have an advance directive, information provided.           Brian Joseph Kerley, DO  03/25/24  19:19 EDT    Dictated utilizing Dragon dictation.

## 2024-03-26 ENCOUNTER — TELEPHONE (OUTPATIENT)
Dept: SURGERY | Facility: CLINIC | Age: 69
End: 2024-03-26
Payer: MEDICARE

## 2024-03-26 ENCOUNTER — READMISSION MANAGEMENT (OUTPATIENT)
Dept: CALL CENTER | Facility: HOSPITAL | Age: 69
End: 2024-03-26
Payer: MEDICARE

## 2024-03-26 VITALS
BODY MASS INDEX: 33.84 KG/M2 | OXYGEN SATURATION: 95 % | TEMPERATURE: 99 F | HEART RATE: 75 BPM | HEIGHT: 62 IN | SYSTOLIC BLOOD PRESSURE: 103 MMHG | RESPIRATION RATE: 14 BRPM | WEIGHT: 183.86 LBS | DIASTOLIC BLOOD PRESSURE: 65 MMHG

## 2024-03-26 LAB
ALBUMIN SERPL-MCNC: 3.2 G/DL (ref 3.5–5.2)
ALBUMIN/GLOB SERPL: 1.5 G/DL
ALP SERPL-CCNC: 35 U/L (ref 39–117)
ALT SERPL W P-5'-P-CCNC: 19 U/L (ref 1–33)
ANION GAP SERPL CALCULATED.3IONS-SCNC: 10.5 MMOL/L (ref 5–15)
AST SERPL-CCNC: 29 U/L (ref 1–32)
BASOPHILS # BLD AUTO: 0.03 10*3/MM3 (ref 0–0.2)
BASOPHILS NFR BLD AUTO: 0.2 % (ref 0–1.5)
BILIRUB SERPL-MCNC: 0.6 MG/DL (ref 0–1.2)
BUN SERPL-MCNC: 16 MG/DL (ref 8–23)
BUN/CREAT SERPL: 20.3 (ref 7–25)
CALCIUM SPEC-SCNC: 7.9 MG/DL (ref 8.6–10.5)
CHLORIDE SERPL-SCNC: 107 MMOL/L (ref 98–107)
CO2 SERPL-SCNC: 21.5 MMOL/L (ref 22–29)
CREAT SERPL-MCNC: 0.79 MG/DL (ref 0.57–1)
DEPRECATED RDW RBC AUTO: 45.1 FL (ref 37–54)
EGFRCR SERPLBLD CKD-EPI 2021: 82.1 ML/MIN/1.73
EOSINOPHIL # BLD AUTO: 0 10*3/MM3 (ref 0–0.4)
EOSINOPHIL NFR BLD AUTO: 0 % (ref 0.3–6.2)
ERYTHROCYTE [DISTWIDTH] IN BLOOD BY AUTOMATED COUNT: 13.2 % (ref 12.3–15.4)
GLOBULIN UR ELPH-MCNC: 2.1 GM/DL
GLUCOSE BLDC GLUCOMTR-MCNC: 169 MG/DL (ref 70–130)
GLUCOSE BLDC GLUCOMTR-MCNC: 208 MG/DL (ref 70–130)
GLUCOSE SERPL-MCNC: 225 MG/DL (ref 65–99)
HCT VFR BLD AUTO: 43.9 % (ref 34–46.6)
HGB BLD-MCNC: 15 G/DL (ref 12–15.9)
IMM GRANULOCYTES # BLD AUTO: 0.13 10*3/MM3 (ref 0–0.05)
IMM GRANULOCYTES NFR BLD AUTO: 0.8 % (ref 0–0.5)
LYMPHOCYTES # BLD AUTO: 0.62 10*3/MM3 (ref 0.7–3.1)
LYMPHOCYTES NFR BLD AUTO: 4 % (ref 19.6–45.3)
MCH RBC QN AUTO: 31.5 PG (ref 26.6–33)
MCHC RBC AUTO-ENTMCNC: 34.2 G/DL (ref 31.5–35.7)
MCV RBC AUTO: 92.2 FL (ref 79–97)
MONOCYTES # BLD AUTO: 1.12 10*3/MM3 (ref 0.1–0.9)
MONOCYTES NFR BLD AUTO: 7.3 % (ref 5–12)
NEUTROPHILS NFR BLD AUTO: 13.44 10*3/MM3 (ref 1.7–7)
NEUTROPHILS NFR BLD AUTO: 87.7 % (ref 42.7–76)
NRBC BLD AUTO-RTO: 0 /100 WBC (ref 0–0.2)
PLATELET # BLD AUTO: 175 10*3/MM3 (ref 140–450)
PMV BLD AUTO: 10.5 FL (ref 6–12)
POTASSIUM SERPL-SCNC: 4.5 MMOL/L (ref 3.5–5.2)
PROCALCITONIN SERPL-MCNC: 0.36 NG/ML (ref 0–0.25)
PROT SERPL-MCNC: 5.3 G/DL (ref 6–8.5)
RBC # BLD AUTO: 4.76 10*6/MM3 (ref 3.77–5.28)
SODIUM SERPL-SCNC: 139 MMOL/L (ref 136–145)
WBC NRBC COR # BLD AUTO: 15.34 10*3/MM3 (ref 3.4–10.8)

## 2024-03-26 PROCEDURE — 80053 COMPREHEN METABOLIC PANEL: CPT | Performed by: STUDENT IN AN ORGANIZED HEALTH CARE EDUCATION/TRAINING PROGRAM

## 2024-03-26 PROCEDURE — 84145 PROCALCITONIN (PCT): CPT | Performed by: STUDENT IN AN ORGANIZED HEALTH CARE EDUCATION/TRAINING PROGRAM

## 2024-03-26 PROCEDURE — 25010000002 METRONIDAZOLE 500 MG/100ML SOLUTION: Performed by: STUDENT IN AN ORGANIZED HEALTH CARE EDUCATION/TRAINING PROGRAM

## 2024-03-26 PROCEDURE — 82948 REAGENT STRIP/BLOOD GLUCOSE: CPT | Performed by: STUDENT IN AN ORGANIZED HEALTH CARE EDUCATION/TRAINING PROGRAM

## 2024-03-26 PROCEDURE — 85025 COMPLETE CBC W/AUTO DIFF WBC: CPT | Performed by: STUDENT IN AN ORGANIZED HEALTH CARE EDUCATION/TRAINING PROGRAM

## 2024-03-26 PROCEDURE — 25010000002 CEFTRIAXONE PER 250 MG: Performed by: STUDENT IN AN ORGANIZED HEALTH CARE EDUCATION/TRAINING PROGRAM

## 2024-03-26 PROCEDURE — G0378 HOSPITAL OBSERVATION PER HR: HCPCS

## 2024-03-26 PROCEDURE — 25810000003 SODIUM CHLORIDE 0.9 % SOLUTION: Performed by: STUDENT IN AN ORGANIZED HEALTH CARE EDUCATION/TRAINING PROGRAM

## 2024-03-26 PROCEDURE — 82948 REAGENT STRIP/BLOOD GLUCOSE: CPT

## 2024-03-26 PROCEDURE — 63710000001 INSULIN ASPART PER 5 UNITS: Performed by: STUDENT IN AN ORGANIZED HEALTH CARE EDUCATION/TRAINING PROGRAM

## 2024-03-26 PROCEDURE — 99024 POSTOP FOLLOW-UP VISIT: CPT | Performed by: STUDENT IN AN ORGANIZED HEALTH CARE EDUCATION/TRAINING PROGRAM

## 2024-03-26 PROCEDURE — 99239 HOSP IP/OBS DSCHRG MGMT >30: CPT | Performed by: NURSE PRACTITIONER

## 2024-03-26 RX ORDER — LOSARTAN POTASSIUM AND HYDROCHLOROTHIAZIDE 25; 100 MG/1; MG/1
1 TABLET ORAL DAILY
Start: 2024-03-26

## 2024-03-26 RX ORDER — METRONIDAZOLE 500 MG/1
500 TABLET ORAL 3 TIMES DAILY
Qty: 21 TABLET | Refills: 0 | Status: SHIPPED | OUTPATIENT
Start: 2024-03-26 | End: 2024-04-02

## 2024-03-26 RX ORDER — LEVOFLOXACIN 750 MG/1
750 TABLET, FILM COATED ORAL DAILY
Qty: 7 TABLET | Refills: 0 | Status: SHIPPED | OUTPATIENT
Start: 2024-03-26 | End: 2024-04-02

## 2024-03-26 RX ADMIN — INSULIN ASPART 3 UNITS: 100 INJECTION, SOLUTION INTRAVENOUS; SUBCUTANEOUS at 11:26

## 2024-03-26 RX ADMIN — HYDROCODONE BITARTRATE AND ACETAMINOPHEN 1 TABLET: 5; 325 TABLET ORAL at 07:49

## 2024-03-26 RX ADMIN — DULOXETINE HYDROCHLORIDE 60 MG: 30 CAPSULE, DELAYED RELEASE ORAL at 09:15

## 2024-03-26 RX ADMIN — METRONIDAZOLE 500 MG: 5 INJECTION, SOLUTION INTRAVENOUS at 04:43

## 2024-03-26 RX ADMIN — METRONIDAZOLE 500 MG: 5 INJECTION, SOLUTION INTRAVENOUS at 13:10

## 2024-03-26 RX ADMIN — CEFTRIAXONE SODIUM 2000 MG: 1 INJECTION, POWDER, FOR SOLUTION INTRAMUSCULAR; INTRAVENOUS at 11:22

## 2024-03-26 RX ADMIN — INSULIN ASPART 5 UNITS: 100 INJECTION, SOLUTION INTRAVENOUS; SUBCUTANEOUS at 07:42

## 2024-03-26 RX ADMIN — PREGABALIN 150 MG: 75 CAPSULE ORAL at 09:15

## 2024-03-26 RX ADMIN — SODIUM CHLORIDE 100 ML/HR: 9 INJECTION, SOLUTION INTRAVENOUS at 07:08

## 2024-03-26 NOTE — OUTREACH NOTE
Prep Survey      Flowsheet Row Responses   [facility] patient discharged from? [facility]   Is LACE score < 7 ? Yes   Eligibility [facility]   Date of Admission 03/25/24   Date of Discharge 03/26/24   Discharge Disposition Home or Self Care   Discharge diagnosis Acute gangrenous cholecystitis status post cholecystectomy   Does the patient have one of the following disease processes/diagnoses(primary or secondary)? General Surgery   Does the patient have Home health ordered? No   Is there a DME ordered? No   Prep survey completed? Yes            EMILY STODDARD - Registered Nurse

## 2024-03-26 NOTE — PAYOR COMM NOTE
"TO:HUMANA  FROM:JOSÉ MURILLO, RN PHONE [phone]  OBSERVATION NOTIFICATION AND CLINICALS  TAX ID [id number] [facility] [id number]    Mercy Skaggs (68 y.o. Female)       Date of Birth   1955    Social Security Number       Address   [address] [address]    Home Phone   [phone]    MRN   [id number]       Latter-day   P[employer]alism    Marital Status                               Admission Date   3/25/24    Admission Type   Emergency    Admitting Provider   Berta Clifton DO    Attending Provider   Kerley, Brian Joseph, DO    Department, Room/Bed   [facility], W2       Discharge Date       Discharge Disposition       Discharge Destination                                 Attending Provider: Kerley, Brian Joseph, DO    Allergies: Penicillins    Isolation: None   Infection: None   Code Status: CPR    Ht: 157.5 cm (62\")   Wt: 83.4 kg (183 lb 13.8 oz)    Admission Cmt: None   Principal Problem: Status post cholecystectomy [Z90.49]                   Active Insurance as of 3/25/2024       Primary Coverage       Payor Plan Insurance Group Employer/Plan Group    HUMANA MEDICARE REPLACEMENT HU[employer]NA MED ADV GROUP [id number]       Payor Plan Address Payor Plan Phone Number Payor Plan Fax Number Effective Dates    [address] [address] [phone]  2021 - None Entered    [facility]ly [facility] [id number]         Subscriber Name Subscriber Birth Date Member ID       MERCY SKAGGS 1955 [id number]                     Emergency Contacts        (Rel.) Home Phone Work Phone Mobile Phone    Leonidas Skaggs (Spouse) -- -- [phone]                 History & Physical        Kerley, Brian Joseph, DO at 24 [facility]            [facility]   HISTORY AND PHYSICAL      Name:  Mercy Skaggs   Age:  68 y.o.  Sex:  female  :  1955  MRN:  [id number]   Visit Number:  [id number]  Admission Date:  3/25/2024  Date Of Service:  24  Primary Care " Physician:  Carolina Soto APRN    Chief Complaint:     Abdominal pain    History Of Presenting Illness:      Mercy Clemens is a 68-year-old woman with past medical history of type 2 diabetes, hypertension, chronic pain fibromyalgia, hyperlipidemia, osteopenia.  Presented to [facility] ED on 3/25/2024 with concern for abdominal pain radiates around right flank onset prior to presentation.  Was not able to characterize if it happens with eating.  Denies any fevers or chills, vomiting or diarrhea, constipation, change in urination, shortness of air, chest pain.    ED summary: Afebrile, vital signs stable on room air.  Blood glucose 179, otherwise CMP unremarkable.  Lactate not elevated, lipase not elevated.  White count 11.  Hemoglobin 17.  Pattern of infection on urinalysis.  Ultrasound gallbladder distended gallbladder filled with stones and sludge with small amount of pericholecystic fluid, suggestive of acute cholecystitis.  She was provided Rocephin, Flagyl, Toradol, morphine.  Brought to the ED by general surgery-Dr. Clifton for laparoscopic cholecystectomy.  Gallbladder noted to be gangrenous, edematous, acutely inflamed.  Bile spillage occurred, copiously irrigated and suctioned.  Hospitalist was contacted by Dr. Clifton requesting observation admission given gangrenous nature of gallbladder with bile spillage, check labs in the morning and possible discharge if patient is doing well.    Review Of Systems:    All systems were reviewed and negative except as mentioned in history of presenting illness, assessment and plan.    Past Medical History: Patient  has a past medical history of Abdominal bloating, Abdominal pain, Allergic (1956), Arthritis, BMI 34.0-34.9,adult, Cataract, Diabetes mellitus, Diarrhea, Diverticulosis, Encounter for long-term (current) use of medications, Fatigue, Fibromyalgia, primary (2011), Hyperlipidemia, Hypertension, Low back pain, Marked eosinophilia, Myalgia and myositis, Nausea, Obesity,  Osteopenia, Pneumonia, Reaction to chronic stress, RUQ abdominal pain, Urinary tract infection, and Viral gastroenteritis.    Past Surgical History: Patient  has a past surgical history that includes Bladder surgery; Colonoscopy; Hysterectomy; Bilateral oophorectomy; Breast biopsy; Appendectomy; Eye surgery; and Oophorectomy.    Social History: Patient  reports that she has never smoked. She has never used smokeless tobacco. She reports that she does not drink alcohol and does not use drugs.    Family History:  Patient's family history has been reviewed and found to be noncontributory.     Allergies:      Penicillins    Home Medications:    Prior to Admission Medications       Prescriptions Last Dose Informant Patient Reported? Taking?    alendronate (FOSAMAX) 70 MG tablet 3/24/2024  Yes Yes    Diclofenac Sodium (VOLTAREN) 1 % gel gel 3/24/2024  Yes Yes    APPLY 2 GRAMS TO JOINTS OF UPPER EXTREMITY AND 4 GRAMS TO JOINTS OF LOWER EXTREMITY FOUR TIMES DAILY    DULoxetine (CYMBALTA) 60 MG capsule 3/24/2024  Yes Yes    empagliflozin (Jardiance) 10 MG tablet tablet 3/24/2024  No Yes    Take 1 tablet by mouth Daily.     MG tablet 3/24/2024  No Yes    TAKE 1 TABLET BY MOUTH EVERY 8 (EIGHT) HOURS AS NEEDED FOR MODERATE PAIN .    insulin aspart (NovoLOG FlexPen) 100 UNIT/ML solution pen-injector sc pen 3/24/2024  No Yes    Patient uses 20 units with each meal. 20 units 4 times daily.    Jardiance 10 MG tablet tablet 3/24/2024  No Yes    TAKE 1 TABLET EVERY DAY    losartan-hydrochlorothiazide (HYZAAR) 100-25 MG per tablet 3/24/2024  No Yes    TAKE 1 TABLET EVERY DAY    meloxicam (MOBIC) 15 MG tablet 3/24/2024  No Yes    Take 1 tablet by mouth Daily.    metoprolol succinate XL (TOPROL-XL) 25 MG 24 hr tablet 3/24/2024  No Yes    TAKE 1 TABLET EVERY DAY    pregabalin (LYRICA) 150 MG capsule 3/24/2024  No Yes    TAKE 1 CAPSULE TWICE DAILY    Alcohol Swabs (B-D SINGLE USE SWABS REGULAR) pads Unknown  No No    Use 3 times a  day with insulin injections.    Blood Glucose Monitoring Suppl w/Device kit Unknown  No No    Check tid E11.9 wants accu-check guide meter    Continuous Blood Gluc  (FreeStyle James 2 [place]) device Unknown  No No    1 Device Every 14 (Fourteen) Days.    Continuous Blood Gluc Sensor (FreeStyle James 2 Sensor) misc Unknown  No No    1 Device Every 14 (Fourteen) Days.    glucose blood (Accu-Chek Guide) test strip Unknown  No No    TEST BLOOD SUGAR THREE TIMES DAILY    Insulin Pen Needle (B-D UF III MINI PEN NEEDLES) 31G X 5 MM misc Unknown  No No    USE ONE PEN NEEDLE TO GIVE INSULIN SHOTS FOUR TIMES A DAY    Lancets misc Unknown  No No    Check three times daily    Tirzepatide (Mounjaro) 5 MG/0.5ML solution pen-injector pen 3/18/2024  No No    Inject 0.5 mL under the skin into the appropriate area as directed 1 (One) Time Per Week.          ED Medications:    Medications   sodium chloride 0.9 % flush 10 mL ( Intravenous MAR Hold 3/25/24 1507)   lactated ringers infusion ( Intravenous Anesthesia Volume Adjustment 3/25/24 1855)   lidocaine (XYLOCAINE) 1 % injection (9 mL Infiltration Given 3/25/24 1722)   sodium chloride 0.9 % solution (1,000 mL Irrigation Given 3/25/24 1722)   HYDROmorphone (DILAUDID) injection 0.5 mg (has no administration in time range)   HYDROmorphone (DILAUDID) injection 0.25 mg (has no administration in time range)   ondansetron (ZOFRAN) injection 4 mg (has no administration in time range)   ketorolac (TORADOL) injection 15 mg (15 mg Intravenous Given 3/25/24 0952)   cefTRIAXone (ROCEPHIN) 2,000 mg in sodium chloride 0.9 % 100 mL IVPB (0 mg Intravenous Stopped 3/25/24 1202)   metroNIDAZOLE (FLAGYL) IVPB 500 mg (0 mg Intravenous Stopped 3/25/24 1202)   morphine injection 4 mg (4 mg Intravenous Given 3/25/24 1154)   heparin (porcine) 5000 UNIT/ML injection 5,000 Units (5,000 Units Subcutaneous Given 3/25/24 1627)   indocyanine green (IC-GREEN) injection 2.5 mg (2.5 mg Intravenous Given  "3/25/24 1627)   morphine injection 4 mg (4 mg Intravenous Given 3/25/24 1333)   HYDROmorphone (DILAUDID) injection 0.5 mg (0.5 mg Intravenous Given 3/25/24 1620)     Vital Signs:  Temp:  [97.2 °F (36.2 °C)-98.4 °F (36.9 °C)] 97.2 °F (36.2 °C)  Heart Rate:  [54-62] 62  Resp:  [16-18] 16  BP: (117-140)/(60-70) 117/70        03/25/24  0914   Weight: 77.1 kg (170 lb)     Body mass index is 31.09 kg/m².    Physical Exam:     Most recent vital Signs: /70   Pulse 62   Temp 97.2 °F (36.2 °C) (Temporal)   Resp 16   Ht 157.5 cm (62\")   Wt 77.1 kg (170 lb)   SpO2 98%   BMI 31.09 kg/m²     Physical Exam  Constitutional:       General: She is not in acute distress.     Appearance: She is obese. She is ill-appearing. She is not toxic-appearing.   HENT:      Mouth/Throat:      Mouth: Mucous membranes are moist.   Eyes:      Extraocular Movements: Extraocular movements intact.   Cardiovascular:      Rate and Rhythm: Normal rate and regular rhythm.      Pulses: Normal pulses.      Heart sounds: Normal heart sounds.   Pulmonary:      Effort: Pulmonary effort is normal.      Breath sounds: Normal breath sounds.   Abdominal:      Palpations: Abdomen is soft.      Tenderness: There is no abdominal tenderness.   Musculoskeletal:      Right lower leg: No edema.      Left lower leg: No edema.   Skin:     General: Skin is warm.      Capillary Refill: Capillary refill takes less than 2 seconds.   Neurological:      General: No focal deficit present.      Mental Status: She is alert and oriented to person, place, and time.   Psychiatric:         Mood and Affect: Mood normal.         Thought Content: Thought content normal.         Laboratory data:    I have reviewed the labs done in the emergency room.    Results from last 7 days   Lab Units 03/25/24  0950 03/22/24  1043   SODIUM mmol/L 139 145   POTASSIUM mmol/L 4.2 3.8   CHLORIDE mmol/L 101 105   CO2 mmol/L 23.4 30.0*   BUN mg/dL 11 14   CREATININE mg/dL 0.67 1.02*   CALCIUM " mg/dL 9.4 9.6   BILIRUBIN mg/dL 0.6  --    ALK PHOS U/L 41  --    ALT (SGPT) U/L 14  --    AST (SGOT) U/L 19  --    GLUCOSE mg/dL 179* 148*     Results from last 7 days   Lab Units 03/25/24  0950   WBC 10*3/mm3 11.49*   HEMOGLOBIN g/dL 17.0*   HEMATOCRIT % 48.1*   PLATELETS 10*3/mm3 195     Results from last 7 days   Lab Units 03/25/24  0950   INR  0.97                 Results from last 7 days   Lab Units 03/25/24  0950   LIPASE U/L 30         Results from last 7 days   Lab Units 03/25/24  1024   COLOR UA  Yellow   GLUCOSE UA  >=1000 mg/dL (3+)*   KETONES UA  Trace*   BLOOD UA  Negative   LEUKOCYTES UA  Negative   PH, URINE  <=5.0   BILIRUBIN UA  Negative   UROBILINOGEN UA  0.2 E.U./dL   RBC UA /HPF 0-2   WBC UA /HPF 3-5*       Pain Management Panel  More data exists         Latest Ref Rng & Units 12/22/2023 4/27/2023   Pain Management Panel   Creatinine, Urine mg/dL 41.2  200        EKG:      none    Radiology:    US Gallbladder    Result Date: 3/25/2024  PROCEDURE: US GALLBLADDER-  HISTORY: eval cholecystitis  PROCEDURE: Ultrasound images of the right upper quadrant were obtained.  FINDINGS:  The liver parenchyma is normal in echogenicity. The gallbladder is distended and filled with sludge and stones. There is no gallbladder wall thickening. There is a small amount of pericholecystic fluid. Findings are suggestive of acute cholecystitis. The common duct is normal measuring 5.6 mm.      Distended gallbladder filled with stones and sludge with small amount of pericholecystic fluid. Findings are suggestive of acute cholecystitis.      Images were reviewed, interpreted, and dictated by Dr. Janessa Mireles MD Transcribed by Polina Carr PA-C.  This report was signed and finalized on 3/25/2024 11:15 AM by Janessa Mireles MD.       Assessment/Plan:    Observation general floor admission 3/25/2024 status post laparoscopic cholecystectomy by general surgery-Dr. Clifton. Gallbladder noted to be gangrenous, edematous, acutely  inflamed.  Bile spillage occurred, copiously irrigated and suctioned.  Hospitalist was contacted by Dr. Clifton requesting observation admission given gangrenous nature of gallbladder with bile spillage, check labs in the morning and possible discharge if patient is doing well.    At time of admission resting in bed, pleasantly conversational.  Just appears tired.     updated at bedside.    Status postcholecystectomy  UTI  Rocephin and Flagyl.  Morning labs.  If doing well in the morning, able to discharge with GI follow-up.  Follow-up with general surgery outpatient.    Chronic:  type 2 diabetes, hypertension, chronic pain fibromyalgia, hyperlipidemia, osteopenia    Subcutaneous insulin protocol.  Continue other home medications.    Risk Assessment: High  DVT Prophylaxis: SCDs  Code Status: Full code  Diet: Cardiac/diabetic/low-fat    Advance Care Planning  ACP discussion was held with the patient during this visit. Patient does not have an advance directive, information provided.           Brian Joseph Kerley, DO  24  19:19 EDT    Dictated utilizing Dragon dictation.    Electronically signed by Kerley, Brian Joseph, DO at 24 [id number]       Berta Clifton DO at 24 1632              [facility]   HISTORY AND PHYSICAL      Name:  Mercy Clemens   Age:  68 y.o.  Sex:  female  :  1955  MRN:  [id number]   Visit Number:  [id number]  Admission Date:  3/25/2024  Date Of Service:  24  Primary Care Physician:  Carolina Soto APRN    Chief Complaint:     Abdominal pain    History Of Presenting Illness:      68 year old female with PMH DM2, HTN, and fibromyalgia presented to ED with one day of epigastric and RUQ abdominal pain. She states that yesterday she had soup beans and a baked potato with butter for dinner, shortly thereafter she developed abdominal pain. It got progressively worse overnight. She tried to take Pepto and it did not help. She denies  nausea, vomiting, and diarrhea. She has never had pain like this in the past.     Review Of Systems:     General ROS: Patient denies any fevers, chills or loss of consciousness.  No complaints of generalized weakness  Psychological ROS: Denies any hallucinations and delusions.  Ophthalmic ROS: no transient loss of vision.  ENT ROS: Denies sore throat, nasal congestion or ear pain.   Allergy and Immunology ROS: Denies rash or itching.  Hematological and Lymphatic ROS: Denies neck swelling or easy bleeding.  Endocrine ROS: Denies any recent unintentional weight gain or loss.  Breast ROS: Denies any pain or swelling.  Respiratory ROS: Denies cough or shortness of breath.   Cardiovascular ROS: Denies chest pain or palpitations. No history of exertional chest pain.   Gastrointestinal ROS: Admits to abdominal pain. Denies nausea, vomiting, and diarrhea.  Genito-Urinary ROS: Denies dysuria or hematuria.  Musculoskeletal ROS: no back pain. No muscle pain. No calf pain.   Neurological ROS: Denies any focal weakness. No loss of consciousness. Denies any numbness.   Dermatological ROS: Denies any redness or pruritis.     Past Medical History:    Past Medical History:   Diagnosis Date    Abdominal bloating     Abdominal pain     Allergic 1957    Penicillin    Arthritis     Knees hips    BMI 34.0-34.9,adult     Cataract     Mini cataracts    Diabetes mellitus     Diarrhea     Diverticulosis     Noted in colon test    Encounter for long-term (current) use of medications     Fatigue     Fibromyalgia, primary 2011    Entire body    Hyperlipidemia     Hypertension     Low back pain     Detiorated disk    Marked eosinophilia     Myalgia and myositis     Nausea     Obesity     Weight at 235 at one time    Osteopenia     Noted in test    Pneumonia     Have had one occurrance of pneumonia    Reaction to chronic stress     RUQ abdominal pain     Urinary tract infection     Uti have had many    Viral gastroenteritis        Past Surgical  history:    Past Surgical History:   Procedure Laterality Date    APPENDECTOMY      Taken out    BILATERAL OOPHORECTOMY      BLADDER SURGERY      Bladder tack : Mckay Marzetti procedure    BREAST BIOPSY      COLONOSCOPY      march 2015    EYE SURGERY      To straighten pulled eyes    HYSTERECTOMY      OOPHORECTOMY         Social History:    Social History     Socioeconomic History    Marital status:    Tobacco Use    Smoking status: Never    Smokeless tobacco: Never   Vaping Use    Vaping status: Never Used   Substance and Sexual Activity    Alcohol use: No    Drug use: No    Sexual activity: Yes     Partners: Male     Comment: Hysterectomy       Family History:    Family History   Problem Relation Age of Onset    Arthritis Mother     Hypertension Mother     Stroke Mother         small stroke    Hyperlipidemia Mother     Cancer Father         lung cancer    Lung cancer Father     No Known Problems Sister     No Known Problems Sister     Lymphoma Maternal Uncle     Cancer Maternal Uncle     Lung cancer Maternal Uncle     Cancer Paternal Uncle     Lung cancer Paternal Uncle     Breast cancer Neg Hx        Allergies:      Penicillins    Home Medications:    Prior to Admission Medications       Prescriptions Last Dose Informant Patient Reported? Taking?    alendronate (FOSAMAX) 70 MG tablet 3/24/2024  Yes Yes    Diclofenac Sodium (VOLTAREN) 1 % gel gel 3/24/2024  Yes Yes    APPLY 2 GRAMS TO JOINTS OF UPPER EXTREMITY AND 4 GRAMS TO JOINTS OF LOWER EXTREMITY FOUR TIMES DAILY    DULoxetine (CYMBALTA) 60 MG capsule 3/24/2024  Yes Yes    empagliflozin (Jardiance) 10 MG tablet tablet 3/24/2024  No Yes    Take 1 tablet by mouth Daily.     MG tablet 3/24/2024  No Yes    TAKE 1 TABLET BY MOUTH EVERY 8 (EIGHT) HOURS AS NEEDED FOR MODERATE PAIN .    insulin aspart (NovoLOG FlexPen) 100 UNIT/ML solution pen-injector sc pen 3/24/2024  No Yes    Patient uses 20 units with each meal. 20 units 4 times daily.     Jardiance 10 MG tablet tablet 3/24/2024  No Yes    TAKE 1 TABLET EVERY DAY    losartan-hydrochlorothiazide (HYZAAR) 100-25 MG per tablet 3/24/2024  No Yes    TAKE 1 TABLET EVERY DAY    meloxicam (MOBIC) 15 MG tablet 3/24/2024  No Yes    Take 1 tablet by mouth Daily.    metoprolol succinate XL (TOPROL-XL) 25 MG 24 hr tablet 3/24/2024  No Yes    TAKE 1 TABLET EVERY DAY    pregabalin (LYRICA) 150 MG capsule 3/24/2024  No Yes    TAKE 1 CAPSULE TWICE DAILY    Alcohol Swabs (B-D SINGLE USE SWABS REGULAR) pads Unknown  No No    Use 3 times a day with insulin injections.    Blood Glucose Monitoring Suppl w/Device kit Unknown  No No    Check tid E11.9 wants accu-check guide meter    Continuous Blood Gluc  (FreeStyle James 2 [place]) device Unknown  No No    1 Device Every 14 (Fourteen) Days.    Continuous Blood Gluc Sensor (FreeStyle James 2 Sensor) misc Unknown  No No    1 Device Every 14 (Fourteen) Days.    glucose blood (Accu-Chek Guide) test strip Unknown  No No    TEST BLOOD SUGAR THREE TIMES DAILY    Insulin Pen Needle (B-D UF III MINI PEN NEEDLES) 31G X 5 MM misc Unknown  No No    USE ONE PEN NEEDLE TO GIVE INSULIN SHOTS FOUR TIMES A DAY    Lancets misc Unknown  No No    Check three times daily    Tirzepatide (Mounjaro) 5 MG/0.5ML solution pen-injector pen 3/18/2024  No No    Inject 0.5 mL under the skin into the appropriate area as directed 1 (One) Time Per Week.               ED Medications:    Medications   sodium chloride 0.9 % flush 10 mL ( Intravenous MAR Hold 3/25/24 1507)   sodium chloride 0.9 % flush 10 mL (has no administration in time range)   sodium chloride 0.9 % flush 10 mL (has no administration in time range)   sodium chloride 0.9 % infusion 40 mL (has no administration in time range)   lactated ringers infusion (50 mL/hr Intravenous New Bag 3/25/24 7667)   ketorolac (TORADOL) injection 15 mg (15 mg Intravenous Given 3/25/24 9377)   cefTRIAXone (ROCEPHIN) 2,000 mg in sodium chloride 0.9 % 100 mL  IVPB (0 mg Intravenous Stopped 3/25/24 1202)   metroNIDAZOLE (FLAGYL) IVPB 500 mg (0 mg Intravenous Stopped 3/25/24 1202)   morphine injection 4 mg (4 mg Intravenous Given 3/25/24 1154)   heparin (porcine) 5000 UNIT/ML injection 5,000 Units (5,000 Units Subcutaneous Given 3/25/24 1627)   indocyanine green (IC-GREEN) injection 2.5 mg (2.5 mg Intravenous Given 3/25/24 1627)   morphine injection 4 mg (4 mg Intravenous Given 3/25/24 1333)   HYDROmorphone (DILAUDID) injection 0.5 mg (0.5 mg Intravenous Given 3/25/24 1620)       Vital Signs:    Temp:  [97.2 °F (36.2 °C)-98.4 °F (36.9 °C)] 97.2 °F (36.2 °C)  Heart Rate:  [54-62] 62  Resp:  [16-18] 16  BP: (117-140)/(60-70) 117/70        03/25/24  0914   Weight: 77.1 kg (170 lb)       Body mass index is 31.09 kg/m².    Physical Exam:      General Appearance:  Alert and cooperative, not in any acute distress.   Head:  Atraumatic and normocephalic, without obvious abnormality.   Eyes:          PERRLA, conjunctivae and sclerae normal, no Icterus. No pallor. Extraocular movements are within normal limits.   Throat: No oral lesions, no thrush, oral mucosa moist.   Neck: Supple, trachea midline, no thyromegaly, no carotid bruit.   Respiratory/Lungs:   Breath sounds heard bilaterally equally.  No crackles or wheezing. No Pleural rub or bronchial breathing. Normal respiratory effort.    Cardiovascular/Heart:  Normal S1 and S2, no murmur. No edema   GI/Abdomen:   Obese, soft, non-distended, RUQ and epigastric abdominal pain to palpation. +Estrada's sign.                Musculoskeletal/ Extremities:   Moves all extremities well   Skin: No bleeding, bruising or rash, no induration   Psychiatric : Alert and oriented ×3.  No depression or anxiety    Neurologic: Cranial nerves 2 - 12 grossly intact, sensation intact, Motor power is normal and equal bilaterally.         Labs:    Lab Results (last 24 hours)       Procedure Component Value Units Date/Time    POC Glucose Once [[id number]]   (Abnormal) Collected: 03/25/24 1532    Specimen: Blood Updated: 03/25/24 1547     Glucose 137 mg/dL      Comment: Serial Number: [id number]:  [id number]       Protime-INR [[id number]]  (Normal) Collected: 03/25/24 0950    Specimen: Blood Updated: 03/25/24 1134     Protime 13.4 Seconds      INR 0.97    Narrative:      Suggested INR therapeutic range for stable oral anticoagulant therapy:    Low Intensity therapy:   1.5-2.0  Moderate Intensity therapy:   2.0-3.0  High Intensity therapy:   2.5-4.0    aPTT [[id number]]  (Normal) Collected: 03/25/24 0950    Specimen: Blood Updated: 03/25/24 1134     PTT 27.3 seconds     [facility] [[id number]] Collected: 03/25/24 0950    Specimen: Blood Updated: 03/25/24 1100    Narrative:      The following orders were created for panel order [facility].  Procedure                               Abnormality         Status                     ---------                               -----------         ------                     Green Top (Gel)[[id number]]                                  Final result               Lavender Top[[id number]]                                     Final result               Gold Top - SST[[id number]]                                   Final result               Light Blue Top[[id number]]                                   Final result                 Please view results for these tests on the individual orders.    Green Top (Gel) [[id number]] Collected: 03/25/24 0950    Specimen: Blood Updated: 03/25/24 1100     Extra Tube Hold for add-ons.     Comment: Auto resulted.       Lavender Top [[id number]] Collected: 03/25/24 0950    Specimen: Blood Updated: 03/25/24 1100     Extra Tube hold for add-on     Comment: Auto resulted       Gold Top - SST [[id number]] Collected: 03/25/24 0950    Specimen: Blood Updated: 03/25/24 1100     Extra Tube Hold for add-ons.     Comment: Auto resulted.       Light Blue Top [[id number]] Collected: 03/25/24 0950    Specimen: Blood Updated:  03/25/24 1100     Extra Tube Hold for add-ons.     Comment: Auto resulted       Urinalysis, Microscopic Only - Urine, Clean Catch [[id number]]  (Abnormal) Collected: 03/25/24 1024    Specimen: Urine, Clean Catch Updated: 03/25/24 1040     RBC, UA 0-2 /HPF      WBC, UA 3-5 /HPF      Bacteria, UA 4+ /HPF      Squamous Epithelial Cells, UA 3-6 /HPF      Hyaline Casts, UA None Seen /LPF      Methodology Manual Light Microscopy    Urinalysis With Microscopic If Indicated (No Culture) - Urine, Clean Catch [[id number]]  (Abnormal) Collected: 03/25/24 1024    Specimen: Urine, Clean Catch Updated: 03/25/24 1032     Color, UA Yellow     Appearance, UA Cloudy     pH, UA <=5.0     Specific Gravity, UA >=1.030     Glucose, UA >=1000 mg/dL (3+)     Ketones, UA Trace     Bilirubin, UA Negative     Blood, UA Negative     Protein, UA Negative     Leuk Esterase, UA Negative     Nitrite, UA Positive     Urobilinogen, UA 0.2 E.U./dL    Comprehensive Metabolic Panel [[id number]]  (Abnormal) Collected: 03/25/24 0950    Specimen: Blood Updated: 03/25/24 1018     Glucose 179 mg/dL      BUN 11 mg/dL      Creatinine 0.67 mg/dL      Sodium 139 mmol/L      Potassium 4.2 mmol/L      Comment: Slight hemolysis detected by analyzer. Result may be falsely elevated.        Chloride 101 mmol/L      CO2 23.4 mmol/L      Calcium 9.4 mg/dL      Total Protein 6.4 g/dL      Albumin 4.1 g/dL      ALT (SGPT) 14 U/L      AST (SGOT) 19 U/L      Comment: Slight hemolysis detected by analyzer. Result may be falsely elevated.        Alkaline Phosphatase 41 U/L      Total Bilirubin 0.6 mg/dL      Globulin 2.3 gm/dL      A/G Ratio 1.8 g/dL      BUN/Creatinine Ratio 16.4     Anion Gap 14.6 mmol/L      eGFR 95.9 mL/min/1.73     Narrative:      GFR Normal >60  Chronic Kidney Disease <60  Kidney Failure <15      Lipase [[id number]]  (Normal) Collected: 03/25/24 0950    Specimen: Blood Updated: 03/25/24 1016     Lipase 30 U/L     Lactic Acid, Plasma [[id number]]  (Normal)  Collected: 03/25/24 0950    Specimen: Blood Updated: 03/25/24 1012     Lactate 1.6 mmol/L     CBC & Differential [[id number]]  (Abnormal) Collected: 03/25/24 0950    Specimen: Blood Updated: 03/25/24 0958    Narrative:      The following orders were created for panel order CBC & Differential.  Procedure                               Abnormality         Status                     ---------                               -----------         ------                     CBC Auto Differential[[id number]]        Abnormal            Final result                 Please view results for these tests on the individual orders.    CBC Auto Differential [[id number]]  (Abnormal) Collected: 03/25/24 0950    Specimen: Blood Updated: 03/25/24 0958     WBC 11.49 10*3/mm3      RBC 5.36 10*6/mm3      Hemoglobin 17.0 g/dL      Hematocrit 48.1 %      MCV 89.7 fL      MCH 31.7 pg      MCHC 35.3 g/dL      RDW 13.0 %      RDW-SD 42.4 fl      MPV 10.0 fL      Platelets 195 10*3/mm3      Neutrophil % 86.1 %      Lymphocyte % 8.5 %      Monocyte % 3.7 %      Eosinophil % 0.4 %      Basophil % 0.4 %      Immature Grans % 0.9 %      Neutrophils, Absolute 9.89 10*3/mm3      Lymphocytes, Absolute 0.98 10*3/mm3      Monocytes, Absolute 0.42 10*3/mm3      Eosinophils, Absolute 0.05 10*3/mm3      Basophils, Absolute 0.05 10*3/mm3      Immature Grans, Absolute 0.10 10*3/mm3      nRBC 0.0 /100 WBC             Radiology:    Imaging Results (Last 72 Hours)       Procedure Component Value Units Date/Time    US Gallbladder [[id number]] Collected: 03/25/24 1042     Updated: 03/25/24 1117    Narrative:      PROCEDURE: US GALLBLADDER-     HISTORY: eval cholecystitis     PROCEDURE: Ultrasound images of the right upper quadrant were obtained.     FINDINGS:  The liver parenchyma is normal in echogenicity. The  gallbladder is distended and filled with sludge and stones. There is no  gallbladder wall thickening. There is a small amount of pericholecystic  fluid. Findings  are suggestive of acute cholecystitis. The common duct  is normal measuring 5.6 mm.       Impression:      Distended gallbladder filled with stones and sludge with  small amount of pericholecystic fluid. Findings are suggestive of acute  cholecystitis.                 Images were reviewed, interpreted, and dictated by Dr. Janessa Mireles MD  Transcribed by Polina Carr PA-C.     This report was signed and finalized on 3/25/2024 11:15 AM by Janessa Mireles MD.               Assessment:    Acute cholecystitis    Plan:     I did have a detailed and extensive discussion with the patient in the hospital today and they understand that they need to undergo robotic assisted laparoscopic cholecystectomy with ICG or possible open cholecystectomy. Full risks and benefits of operative versus nonoperative intervention were discussed with the patient and these included things such as nonresolution of symptoms and possible worsening of symptoms without surgical intervention versus infection, bleeding, open cholecystectomy, common bile duct injury, postoperative biliary leakage, need for drain placement, possible inability to perform cholangiography due to inflammation, postoperative abscess, etc with surgical intervention. The patient understands, agrees, and wishes to proceed with the surgical treatment plan as mentioned above. The patient had no questions for me at the end of the discussion.      I discussed the patients findings and my recommendations with patient.    Berta Clifton DO  03/25/24  16:32 EDT    Electronically signed by Berta Clifton DO at 03/25/24 1640          Emergency Department Notes        Gissel Prado RN at 03/25/24 1441          Report given to pre op    Electronically signed by Gissel Prado RN at 03/25/24 1442       Jaiden Alanis DO at 03/25/24 1008                 [facility] OR  Emergency Department Encounter  Emergency Medicine Physician Note     Pt  Name:Mercy Clemens  MRN: [id number]  Birthdate 1955  Date of evaluation: 3/25/2024  PCP:  Carolina Soto APRN  Note Started: 10:08 AM EDT      CHIEF COMPLAINT       Chief Complaint   Patient presents with    Abdominal Pain    Back Pain       HISTORY OF PRESENT ILLNESS  (Location/Symptom, Timing/Onset, Context/Setting, Quality, Duration, Modifying Factors, Severity.)      Mercy Clemens is a 68 y.o. female who presents with abdominal pain that radiates around the flank.  Patient describes it on the right side.  Patient is never experienced this before, states it started yesterday.  Patient unable to identify if it truly happens with eating.  Patient unable to identify when it truly happens.  Patient has history of appendectomy, also describes tubal ligation.  Patient denies any fevers chills nausea or vomiting.  Patient denies any diarrhea constipation, change in urination.    PAST MEDICAL / SURGICAL / SOCIAL / FAMILY HISTORY     Past Medical History:   Diagnosis Date    Abdominal bloating     Abdominal pain     Allergic 1957    Penicillin    Arthritis     Knees hips    BMI 34.0-34.9,adult     Cataract     Mini cataracts    Diabetes mellitus     Diarrhea     Diverticulosis     Noted in colon test    Encounter for long-term (current) use of medications     Fatigue     Fibromyalgia, primary 2011    Entire body    Hyperlipidemia     Hypertension     Low back pain     Detiorated disk    Marked eosinophilia     Myalgia and myositis     Nausea     Obesity     Weight at 235 at one time    Osteopenia     Noted in test    Pneumonia     Have had one occurrance of pneumonia    Reaction to chronic stress     RUQ abdominal pain     Urinary tract infection     Uti have had many    Viral gastroenteritis      No additional pertinent       Past Surgical History:   Procedure Laterality Date    APPENDECTOMY      Taken out    BILATERAL OOPHORECTOMY      BLADDER SURGERY      Bladder tack : Mckay Marzetti procedure    BREAST  BIOPSY      COLONOSCOPY      march 2015    EYE SURGERY      To straighten pulled eyes    HYSTERECTOMY      OOPHORECTOMY       No additional pertinent       Social History     Socioeconomic History    Marital status:    Tobacco Use    Smoking status: Never    Smokeless tobacco: Never   Vaping Use    Vaping status: Never Used   Substance and Sexual Activity    Alcohol use: No    Drug use: No    Sexual activity: Yes     Partners: Male     Comment: Hysterectomy       Family History   Problem Relation Age of Onset    Arthritis Mother     Hypertension Mother     Stroke Mother         small stroke    Hyperlipidemia Mother     Cancer Father         lung cancer    Lung cancer Father     No Known Problems Sister     No Known Problems Sister     Lymphoma Maternal Uncle     Cancer Maternal Uncle     Lung cancer Maternal Uncle     Cancer Paternal Uncle     Lung cancer Paternal Uncle     Breast cancer Neg Hx        Allergies:  Penicillins    Home Medications:  Prior to Admission medications    Medication Sig Start Date End Date Taking? Authorizing Provider   Alcohol Swabs (B-D SINGLE USE SWABS REGULAR) pads Use 3 times a day with insulin injections. 7/28/21   Carolina Soto APRN   alendronate (FOSAMAX) 70 MG tablet  8/11/20   Geo Irizarry MD   Blood Glucose Monitoring Suppl w/Device kit Check tid E11.9 wants accu-check guide meter 7/23/21   Carolina Soto APRN   Continuous Blood Gluc  (FreeStyle James 2 [place]) device 1 Device Every 14 (Fourteen) Days. 9/29/22   Carolina Soto APRN   Continuous Blood Gluc Sensor (FreeStyle James 2 Sensor) misc 1 Device Every 14 (Fourteen) Days. 9/29/22   Carolina Soto APRN   Diclofenac Sodium (VOLTAREN) 1 % gel gel APPLY 2 GRAMS TO JOINTS OF UPPER EXTREMITY AND 4 GRAMS TO JOINTS OF LOWER EXTREMITY FOUR TIMES DAILY 8/12/21   Geo Irizarry MD   DULoxetine (CYMBALTA) 60 MG capsule  9/14/22   Geo Irizarry MD   empagliflozin (Jardiance)  10 MG tablet tablet Take 1 tablet by mouth Daily. 5/16/23   Carolina Soto APRN   glucose blood (Accu-Chek Guide) test strip TEST BLOOD SUGAR THREE TIMES DAILY 2/1/24   Carolina Soto APRN    MG tablet TAKE 1 TABLET BY MOUTH EVERY 8 (EIGHT) HOURS AS NEEDED FOR MODERATE PAIN . 5/23/22   Carolina Soto APRN   insulin aspart (NovoLOG FlexPen) 100 UNIT/ML solution pen-injector sc pen Patient uses 20 units with each meal. 20 units 4 times daily. 4/14/23   Carolina Soto APRN   Insulin Pen Needle (B-D UF III MINI PEN NEEDLES) 31G X 5 MM misc USE ONE PEN NEEDLE TO GIVE INSULIN SHOTS FOUR TIMES A DAY 4/20/23   Carolina Soto APRN   Jardiance 10 MG tablet tablet TAKE 1 TABLET EVERY DAY 5/16/23   Carolina Soto APRN   Lancets misc Check three times daily 7/18/18   Carolina Soto APRN   losartan-hydrochlorothiazide (HYZAAR) 100-25 MG per tablet TAKE 1 TABLET EVERY DAY 2/28/24   Carolina Soto APRN   meloxicam (MOBIC) 15 MG tablet Take 1 tablet by mouth Daily. 10/28/21   Carolina Soto APRN   metoprolol succinate XL (TOPROL-XL) 25 MG 24 hr tablet TAKE 1 TABLET EVERY DAY 11/27/23   Carolina Soto APRN   pregabalin (LYRICA) 150 MG capsule TAKE 1 CAPSULE TWICE DAILY 10/11/23   Carolina Soto APRN   Tirzepatide (Mounjaro) 5 MG/0.5ML solution pen-injector pen Inject 0.5 mL under the skin into the appropriate area as directed 1 (One) Time Per Week. 2/7/24   Carolina Soto APRN         REVIEW OF SYSTEMS       Review of Systems   Constitutional:  Negative for chills and fever.   Respiratory:  Negative for shortness of breath.    Cardiovascular:  Negative for chest pain.   Gastrointestinal:  Positive for abdominal pain. Negative for nausea and vomiting.   Endocrine: Negative for polyuria.   Genitourinary:  Negative for difficulty urinating and dysuria.       PHYSICAL EXAM      INITIAL VITALS:   /70   Pulse 62   Temp 97.2 °F (36.2 °C) (Temporal)    "Resp 16   Ht 157.5 cm (62\")   Wt 77.1 kg (170 lb)   SpO2 98%   BMI 31.09 kg/m²     Physical Exam  Constitutional:       Appearance: Normal appearance.   HENT:      Head: Normocephalic and atraumatic.      Mouth/Throat:      Mouth: Mucous membranes are moist.      Pharynx: Oropharynx is clear.   Cardiovascular:      Rate and Rhythm: Normal rate and regular rhythm.      Pulses: Normal pulses.   Pulmonary:      Effort: Pulmonary effort is normal.      Breath sounds: Normal breath sounds.   Abdominal:      General: Abdomen is flat. There is no distension.      Palpations: Abdomen is soft.      Tenderness: There is abdominal tenderness in the right upper quadrant and epigastric area. There is no guarding. Positive signs include Estrada's sign.   Musculoskeletal:         General: Normal range of motion.   Skin:     General: Skin is warm and dry.   Neurological:      General: No focal deficit present.      Mental Status: She is alert and oriented to person, place, and time.   Psychiatric:         Mood and Affect: Mood normal.         Behavior: Behavior normal.           DDX/DIAGNOSTIC RESULTS / EMERGENCY DEPARTMENT COURSE / MDM     Differential Diagnosis included but not limited: Pyelonephritis, acute cystitis, cholecystitis.    Diagnoses Considered but Do Not Suspect: Peritoneal abdomen,    Decision Rules/Scores utilized: N/A    Tests considered but not ordered and why: N/A    MIPS: N/A     Code Status Discussion:  Not Discussed    Additional Patient Education Provided: None     Medical Decision Making    Medical Decision Making  Patient presented right flank sided flank pain, plan to obtain urine, will obtain general laboratory evaluation.  Patient does have positive Estrada sign, plan for ultrasound of the right upper quadrant.  Patient with no CVA tenderness, low concern for pyelonephritis.    Ultrasound demonstrated concerns for cholecystitis.  Patient nonseptic appearing, no severe infections.  Patient was given " ceftriaxone and metronidazole for intra-abdominal pathogens.  This will help with patient's acute cystitis and preoperative antibiotic management for cholecystitis.  Patient's case discussed with surgery.  Plan to admit have patient evaluated by surgery and go to the OR today.  Patient agreeable to plan.  Patient taken to the OR from the emergency department.    Amount and/or Complexity of Data Reviewed  Labs: ordered.  Radiology: ordered.    Risk  Prescription drug management.        EKG  None Performed     All EKG's are interpreted by the Emergency Department Physician who either signs or Co-signs this chart in the absence of a cardiologist.    Additional Scores                   EMERGENCY DEPARTMENT COURSE:    ED Course as of 03/25/24 1528   Mon Mar 25, 2024   1117 Discussed patient's case with surgery, they will recommend doing a procedure this afternoon.  Did not request admission, request holding in the emerged part for an operating case here in the next couple hours. [CR]   1118 Patient started on ceftriaxone and Flagyl for preop and for UTI. [CR]   1356 Patient still waiting for the OR at this time.  Plan to have patient go to the OR directly from the ER. [CR]      ED Course User Index  [CR] Jaiden Alanis, DO       PROCEDURES:  None Performed   Procedures    DATA FOR LAB AND RADIOLOGY TESTS ORDERED BELOW ARE REVIEWED BY THE ED CLINICIAN:    RADIOLOGY: All x-rays, CT, MRI, and formal ultrasound images (except ED bedside ultrasound) are read by the radiologist, see reports below, unless otherwise noted in MDM or here.  Reports below are reviewed by myself.  US Gallbladder   Final Result   Distended gallbladder filled with stones and sludge with   small amount of pericholecystic fluid. Findings are suggestive of acute   cholecystitis.                       Images were reviewed, interpreted, and dictated by Dr. Janessa Mireles MD   Transcribed by Polina Carr PA-C.       This report was signed and  finalized on 3/25/2024 11:15 AM by Janessa Mireles MD.              LABS: Lab orders shown below, the results are reviewed by myself, and all abnormals are listed below.  Labs Reviewed   COMPREHENSIVE METABOLIC PANEL - Abnormal; Notable for the following components:       Result Value    Glucose 179 (*)     All other components within normal limits    Narrative:     GFR Normal >60  Chronic Kidney Disease <60  Kidney Failure <15     URINALYSIS W/ MICROSCOPIC IF INDICATED (NO CULTURE) - Abnormal; Notable for the following components:    Appearance, UA Cloudy (*)     Glucose, UA >=1000 mg/dL (3+) (*)     Ketones, UA Trace (*)     Nitrite, UA Positive (*)     All other components within normal limits   CBC WITH AUTO DIFFERENTIAL - Abnormal; Notable for the following components:    WBC 11.49 (*)     RBC 5.36 (*)     Hemoglobin 17.0 (*)     Hematocrit 48.1 (*)     Neutrophil % 86.1 (*)     Lymphocyte % 8.5 (*)     Monocyte % 3.7 (*)     Immature Grans % 0.9 (*)     Neutrophils, Absolute 9.89 (*)     Immature Grans, Absolute 0.10 (*)     All other components within normal limits   URINALYSIS, MICROSCOPIC ONLY - Abnormal; Notable for the following components:    WBC, UA 3-5 (*)     Bacteria, UA 4+ (*)     Squamous Epithelial Cells, UA 3-6 (*)     All other components within normal limits   LIPASE - Normal   LACTIC ACID, PLASMA - Normal   PROTIME-INR - Normal    Narrative:     Suggested INR therapeutic range for stable oral anticoagulant therapy:    Low Intensity therapy:   1.5-2.0  Moderate Intensity therapy:   2.0-3.0  High Intensity therapy:   2.5-4.0   APTT - Normal   RAINBOW DRAW    Narrative:     The following orders were created for panel order [place] Draw.  Procedure                               Abnormality         Status                     ---------                               -----------         ------                     Green Top (Gel)[[id number]]                                  Final result                Lavender Top[[id number]]                                     Final result               Gold Top - SST[[id number]]                                   Final result               Light Blue Top[[id number]]                                   Final result                 Please view results for these tests on the individual orders.   CBC AND DIFFERENTIAL    Narrative:     The following orders were created for panel order CBC & Differential.  Procedure                               Abnormality         Status                     ---------                               -----------         ------                     CBC Auto Differential[[id number]]        Abnormal            Final result                 Please view results for these tests on the individual orders.   GREEN TOP   LAVENDER TOP   GOLD TOP - SST   LIGHT BLUE TOP       Vitals Reviewed:    Vitals:    03/25/24 1002 03/25/24 1047 03/25/24 1346 03/25/24 1509   BP:  140/61 136/60 117/70   BP Location:       Patient Position:       Pulse:   57 62   Resp:    16   Temp: 98.4 °F (36.9 °C)   97.2 °F (36.2 °C)   TempSrc: Axillary   Temporal   SpO2:   97% 98%   Weight:       Height:           MEDICATIONS GIVEN TO PATIENT THIS ENCOUNTER:  Medications   sodium chloride 0.9 % flush 10 mL ( Intravenous MAR Hold 3/25/24 1507)   sodium chloride 0.9 % flush 10 mL (has no administration in time range)   sodium chloride 0.9 % flush 10 mL (has no administration in time range)   sodium chloride 0.9 % infusion 40 mL (has no administration in time range)   lactated ringers infusion (has no administration in time range)   heparin (porcine) 5000 UNIT/ML injection 5,000 Units (has no administration in time range)   indocyanine green (IC-GREEN) injection 2.5 mg (has no administration in time range)   ketorolac (TORADOL) injection 15 mg (15 mg Intravenous Given 3/25/24 2570)   cefTRIAXone (ROCEPHIN) 2,000 mg in sodium chloride 0.9 % 100 mL IVPB (0 mg Intravenous Stopped 3/25/24 1202)    metroNIDAZOLE (FLAGYL) IVPB 500 mg (0 mg Intravenous Stopped 3/25/24 1202)   morphine injection 4 mg (4 mg Intravenous Given 3/25/24 1154)   morphine injection 4 mg (4 mg Intravenous Given 3/25/24 1333)       CONSULTS:  None    CRITICAL CARE:  There was significant risk of life threatening deterioration of patient's condition requiring my direct management. Critical care time 0 minutes, excluding any documented procedures.    FINAL IMPRESSION      1. Acute cholecystitis    2. Acute cystitis without hematuria          DISPOSITION / PLAN     ED Disposition       ED Disposition   Send to OR    Condition   --    Comment   --               PATIENT REFERRED TO:  No follow-up provider specified.    DISCHARGE MEDICATIONS:     Medication List        CONTINUE taking these medications      B-D SINGLE USE SWABS REGULAR pads  Use 3 times a day with insulin injections.     B-D UF III MINI PEN NEEDLES 31G X 5 MM misc  Generic drug: Insulin Pen Needle  USE ONE PEN NEEDLE TO GIVE INSULIN SHOTS FOUR TIMES A DAY     Blood Glucose Monitoring Suppl w/Device kit  Check tid E11.9 wants accu-check guide meter     FreeStyle James 2 [place] device  1 Device Every 14 (Fourteen) Days.     FreeStyle James 2 Sensor misc  1 Device Every 14 (Fourteen) Days.     Lancets misc  Check three times daily            ASK your doctor about these medications      Accu-Chek Guide test strip  Generic drug: glucose blood  TEST BLOOD SUGAR THREE TIMES DAILY     alendronate 70 MG tablet  Commonly known as: FOSAMAX     Diclofenac Sodium 1 % gel gel  Commonly known as: VOLTAREN     DULoxetine 60 MG capsule  Commonly known as: CYMBALTA      MG tablet  Generic drug: ibuprofen  TAKE 1 TABLET BY MOUTH EVERY 8 (EIGHT) HOURS AS NEEDED FOR MODERATE PAIN .     * Jardiance 10 MG tablet tablet  Generic drug: empagliflozin  TAKE 1 TABLET EVERY DAY     * empagliflozin 10 MG tablet tablet  Commonly known as: Jardiance  Take 1 tablet by mouth Daily.      losartan-hydrochlorothiazide 100-25 MG per tablet  Commonly known as: HYZAAR  TAKE 1 TABLET EVERY DAY     meloxicam 15 MG tablet  Commonly known as: MOBIC  Take 1 tablet by mouth Daily.     metoprolol succinate XL 25 MG 24 hr tablet  Commonly known as: TOPROL-XL  TAKE 1 TABLET EVERY DAY     NovoLOG FlexPen 100 UNIT/ML solution pen-injector sc pen  Generic drug: insulin aspart  Patient uses 20 units with each meal. 20 units 4 times daily.     pregabalin 150 MG capsule  Commonly known as: LYRICA  TAKE 1 CAPSULE TWICE DAILY     Tirzepatide 5 MG/0.5ML solution pen-injector pen  Commonly known as: Mounjaro  Inject 0.5 mL under the skin into the appropriate area as directed 1 (One) Time Per Week.           * This list has 2 medication(s) that are the same as other medications prescribed for you. Read the directions carefully, and ask your doctor or other care provider to review them with you.                  Electronically signed by Jaiden Alanis DO, 03/25/24, 10:08 AM EDT.    Emergency Medicine Physician  [facility]  (Please note that portions of thisnote were completed with a voice recognition program.  Efforts were made to edit the dictations but occasionally words are mis-transcribed.)      Jaiden Alanis DO  03/25/24 1530      Electronically signed by Jaiden Alanis DO at 03/25/24 1530       Vital Signs (last day)       Date/Time Temp Temp src Pulse Resp BP Patient Position SpO2    03/26/24 0600 98.9 (37.2) Oral 79 18 97/61 Lying 92    03/26/24 0147 97.6 (36.4) Oral 82 18 95/60 Lying 94    03/25/24 2300 97.4 (36.3) Oral 89 18 113/70 Lying 93    03/25/24 2200 97.9 (36.6) Oral 92 18 102/61 Lying 92    03/25/24 2100 98.3 (36.8) Oral 86 16 87/63 Lying 90    03/25/24 2030 98.2 (36.8) Oral 93 16 111/79 Lying 93    03/25/24 2015 98.5 (36.9) Temporal 88 16 100/54 -- 94    03/25/24 2010 -- -- 90 15 96/83 Lying 96    03/25/24 2000 -- -- 89 14 93/79 Lying 96    03/25/24  1955 -- -- 98 -- -- -- 88    03/25/24 1950 -- -- 97 12 96/73 Lying 91    03/25/24 1943 -- -- 96 -- -- -- 97    03/25/24 1941 -- -- -- -- 97/85 Lying 98    03/25/24 1940 -- -- 93 14 92/80 Lying 98    03/25/24 1930 -- -- 88 14 111/80 Lying 99    03/25/24 1925 -- -- 90 16 110/84 Lying 98    03/25/24 1920 -- -- 87 15 127/71 Lying 99    03/25/24 1915 -- -- 90 16 115/84 Lying 99    03/25/24 1912 97.9 (36.6) Temporal 88 16 122/81 Lying 99    03/25/24 1509 97.2 (36.2) Temporal 62 16 117/70 -- 98    03/25/24 13:46:23 -- -- 57 -- 136/60 -- 97    03/25/24 1047 -- -- -- -- 140/61 -- --    03/25/24 1002 98.4 (36.9) Axillary -- -- -- -- --    03/25/24 0914 -- -- 54 18 129/64 Sitting 98          Current Facility-Administered Medications   Medication Dose Route Frequency Provider Last Rate Last Admin    acetaminophen (TYLENOL) tablet 650 mg  650 mg Oral Q4H PRN Kerley, Brian Joseph, DO        Or    acetaminophen (TYLENOL) 160 MG/5ML oral solution 650 mg  650 mg Oral Q4H PRN Kerley, Brian Joseph, DO        Or    acetaminophen (TYLENOL) suppository 650 mg  650 mg Rectal Q4H PRN Kerley, Brian Joseph, DO        Calcium Replacement - Follow Nurse / BPA Driven Protocol   Does not apply PRN Kerley, Brian Joseph, DO        cefTRIAXone (ROCEPHIN) 2,000 mg in sodium chloride 0.9 % 100 mL IVPB  2,000 mg Intravenous Q24H Kerley, Brian Joseph, DO        dextrose (D50W) (25 g/50 mL) IV injection 25 g  25 g Intravenous Q15 Min PRN Kerley, Brian Joseph, DO        dextrose (GLUTOSE) oral gel 15 g  15 g Oral Q15 Min PRN Kerley, Brian Joseph, DO        DULoxetine (CYMBALTA) DR capsule 60 mg  60 mg Oral Daily Kerley, Brian Joseph, DO        glucagon (GLUCAGEN) injection 1 mg  1 mg Intramuscular Q15 Min PRN Kerley, Brian Joseph, DO        losartan (COZAAR) tablet 100 mg  100 mg Oral Q24H Kerley, Brian Joseph, DO        And    hydroCHLOROthiazide tablet 25 mg  25 mg Oral Q24H Kerley, Brian Joseph, DO        HYDROcodone-acetaminophen (NORCO) 5-325 MG per  tablet 1 tablet  1 tablet Oral Q6H PRN EtienneJasonBerta AMI,         Insulin Aspart (novoLOG) injection 3-14 Units  3-14 Units Subcutaneous 4x Daily AC & at Bedtime Kerley, Brian Joseph, DO        Magnesium Standard Dose Replacement - Follow Nurse / BPA Driven Protocol   Does not apply PRN Kerley, Brian Joseph, DO        metoprolol succinate XL (TOPROL-XL) 24 hr tablet 25 mg  25 mg Oral Daily Kerley, Brian Joseph, DO        metroNIDAZOLE (FLAGYL) IVPB 500 mg  500 mg Intravenous Q8H Kerley, Brian Joseph, DO   Stopped at 03/26/24 0520    ondansetron (ZOFRAN) injection 4 mg  4 mg Intravenous Q6H PRN Kerley, Brian Joseph, DO        Phosphorus Replacement - Follow Nurse / BPA Driven Protocol   Does not apply PRN Kerley, Brian Joseph, DO        Potassium Replacement - Follow Nurse / BPA Driven Protocol   Does not apply PRN Kerley, Brian Joseph, DO        pregabalin (LYRICA) capsule 150 mg  150 mg Oral BID Kerley, Brian Joseph,         sodium chloride 0.9 % flush 10 mL  10 mL Intravenous PRN Kerley, Brian Joseph,         sodium chloride 0.9 % flush 10 mL  10 mL Intravenous Q12H Kerley, Brian Joseph, DO        sodium chloride 0.9 % flush 10 mL  10 mL Intravenous PRN Kerley, Brian Joseph,         sodium chloride 0.9 % infusion 40 mL  40 mL Intravenous PRN Kerley, Brian Joseph, DO        sodium chloride 0.9 % infusion  100 mL/hr Intravenous Continuous Kerley, Brian Joseph,  mL/hr at 03/26/24 0708 100 mL/hr at 03/26/24 0708     Lab Results (last 24 hours)       Procedure Component Value Units Date/Time    POC Glucose Once [[id number]]  (Abnormal) Collected: 03/26/24 0723    Specimen: Blood Updated: 03/26/24 0725     Glucose 208 mg/dL      Comment: Serial Number: [id number]:  [id number]       Procalcitonin [[id number]]  (Abnormal) Collected: 03/26/24 0626    Specimen: Blood Updated: 03/26/24 0709     Procalcitonin 0.36 ng/mL     Narrative:      As a Marker for Sepsis (Non-Neonates):    1. <0.5 ng/mL represents a  "low risk of severe sepsis and/or septic shock.  2. >2 ng/mL represents a high risk of severe sepsis and/or septic shock.    As a Marker for Lower Respiratory Tract Infections that require antibiotic therapy:    PCT on Admission    Antibiotic Therapy       6-12 Hrs later    >0.5                Strongly Recommended  >0.25 - <0.5        Recommended   0.1 - 0.25          Discouraged              Remeasure/reassess PCT  <0.1                Strongly Discouraged     Remeasure/reassess PCT    As 28 day mortality risk marker: \"Change in Procalcitonin Result\" (>80% or <=80%) if Day 0 (or Day 1) and Day 4 values are available. Refer to http://[email or web address]-pct-calculator.[email or web address]    Change in PCT <=80%  A decrease of PCT levels below or equal to 80% defines a positive change in PCT test result representing a higher risk for 28-day all-cause mortality of patients diagnosed with severe sepsis for septic shock.    Change in PCT >80%  A decrease of PCT levels of more than 80% defines a negative change in PCT result representing a lower risk for 28-day all-cause mortality of patients diagnosed with severe sepsis or septic shock.       Comprehensive Metabolic Panel [[id number]]  (Abnormal) Collected: 03/26/24 0626    Specimen: Blood Updated: 03/26/24 0703     Glucose 225 mg/dL      Comment: Glucose >180, Hemoglobin A1C recommended.        BUN 16 mg/dL      Creatinine 0.79 mg/dL      Sodium 139 mmol/L      Potassium 4.5 mmol/L      Comment: Slight hemolysis detected by analyzer. Result may be falsely elevated.        Chloride 107 mmol/L      CO2 21.5 mmol/L      Calcium 7.9 mg/dL      Total Protein 5.3 g/dL      Albumin 3.2 g/dL      ALT (SGPT) 19 U/L      AST (SGOT) 29 U/L      Alkaline Phosphatase 35 U/L      Total Bilirubin 0.6 mg/dL      Globulin 2.1 gm/dL      A/G Ratio 1.5 g/dL      BUN/Creatinine Ratio 20.3     Anion Gap 10.5 mmol/L      eGFR 82.1 mL/min/1.73     Narrative:      GFR Normal >60  Chronic Kidney Disease <60  Kidney " Failure <15      CBC Auto Differential [[id number]] Collected: 03/26/24 0626    Specimen: Blood Updated: 03/26/24 0633    POC Glucose Once [[id number]]  (Abnormal) Collected: 03/25/24 1532    Specimen: Blood Updated: 03/25/24 1547     Glucose 137 mg/dL      Comment: Serial Number: [id number]:  [id number]       Protime-INR [[id number]]  (Normal) Collected: 03/25/24 0950    Specimen: Blood Updated: 03/25/24 1134     Protime 13.4 Seconds      INR 0.97    Narrative:      Suggested INR therapeutic range for stable oral anticoagulant therapy:    Low Intensity therapy:   1.5-2.0  Moderate Intensity therapy:   2.0-3.0  High Intensity therapy:   2.5-4.0    aPTT [[id number]]  (Normal) Collected: 03/25/24 0950    Specimen: Blood Updated: 03/25/24 1134     PTT 27.3 seconds     [place] [facility] [[id number]] Collected: 03/25/24 0950    Specimen: Blood Updated: 03/25/24 1100    Narrative:      The following orders were created for panel order [place] [facility].  Procedure                               Abnormality         Status                     ---------                               -----------         ------                     Green Top (Gel)[[id number]]                                  Final result               Lavender Top[[id number]]                                     Final result               Gold Top - SST[[id number]]                                   Final result               Light Blue Top[[id number]]                                   Final result                 Please view results for these tests on the individual orders.    Green Top (Gel) [[id number]] Collected: 03/25/24 0950    Specimen: Blood Updated: 03/25/24 1100     Extra Tube Hold for add-ons.     Comment: Auto resulted.       Lavender Top [[id number]] Collected: 03/25/24 0950    Specimen: Blood Updated: 03/25/24 1100     Extra Tube hold for add-on     Comment: Auto resulted       Gold Top - SST [[id number]] Collected: 03/25/24 0950    Specimen: Blood Updated: 03/25/24  1100     Extra Tube Hold for add-ons.     Comment: Auto resulted.       Light Blue Top [[id number]] Collected: 03/25/24 0950    Specimen: Blood Updated: 03/25/24 1100     Extra Tube Hold for add-ons.     Comment: Auto resulted       Urinalysis, Microscopic Only - Urine, Clean Catch [[id number]]  (Abnormal) Collected: 03/25/24 1024    Specimen: Urine, Clean Catch Updated: 03/25/24 1040     RBC, UA 0-2 /HPF      WBC, UA 3-5 /HPF      Bacteria, UA 4+ /HPF      Squamous Epithelial Cells, UA 3-6 /HPF      Hyaline Casts, UA None Seen /LPF      Methodology Manual Light Microscopy    Urinalysis With Microscopic If Indicated (No Culture) - Urine, Clean Catch [[id number]]  (Abnormal) Collected: 03/25/24 1024    Specimen: Urine, Clean Catch Updated: 03/25/24 1032     Color, UA Yellow     Appearance, UA Cloudy     pH, UA <=5.0     Specific Gravity, UA >=1.030     Glucose, UA >=1000 mg/dL (3+)     Ketones, UA Trace     Bilirubin, UA Negative     Blood, UA Negative     Protein, UA Negative     Leuk Esterase, UA Negative     Nitrite, UA Positive     Urobilinogen, UA 0.2 E.U./dL    Comprehensive Metabolic Panel [[id number]]  (Abnormal) Collected: 03/25/24 0950    Specimen: Blood Updated: 03/25/24 1018     Glucose 179 mg/dL      BUN 11 mg/dL      Creatinine 0.67 mg/dL      Sodium 139 mmol/L      Potassium 4.2 mmol/L      Comment: Slight hemolysis detected by analyzer. Result may be falsely elevated.        Chloride 101 mmol/L      CO2 23.4 mmol/L      Calcium 9.4 mg/dL      Total Protein 6.4 g/dL      Albumin 4.1 g/dL      ALT (SGPT) 14 U/L      AST (SGOT) 19 U/L      Comment: Slight hemolysis detected by analyzer. Result may be falsely elevated.        Alkaline Phosphatase 41 U/L      Total Bilirubin 0.6 mg/dL      Globulin 2.3 gm/dL      A/G Ratio 1.8 g/dL      BUN/Creatinine Ratio 16.4     Anion Gap 14.6 mmol/L      eGFR 95.9 mL/min/1.73     Narrative:      GFR Normal >60  Chronic Kidney Disease <60  Kidney Failure <15      Lipase  [[id number]]  (Normal) Collected: 03/25/24 0950    Specimen: Blood Updated: 03/25/24 1016     Lipase 30 U/L     Lactic Acid, Plasma [[id number]]  (Normal) Collected: 03/25/24 0950    Specimen: Blood Updated: 03/25/24 1012     Lactate 1.6 mmol/L     CBC & Differential [[id number]]  (Abnormal) Collected: 03/25/24 0950    Specimen: Blood Updated: 03/25/24 0958    Narrative:      The following orders were created for panel order CBC & Differential.  Procedure                               Abnormality         Status                     ---------                               -----------         ------                     CBC Auto Differential[[id number]]        Abnormal            Final result                 Please view results for these tests on the individual orders.    CBC Auto Differential [[id number]]  (Abnormal) Collected: 03/25/24 0950    Specimen: Blood Updated: 03/25/24 0958     WBC 11.49 10*3/mm3      RBC 5.36 10*6/mm3      Hemoglobin 17.0 g/dL      Hematocrit 48.1 %      MCV 89.7 fL      MCH 31.7 pg      MCHC 35.3 g/dL      RDW 13.0 %      RDW-SD 42.4 fl      MPV 10.0 fL      Platelets 195 10*3/mm3      Neutrophil % 86.1 %      Lymphocyte % 8.5 %      Monocyte % 3.7 %      Eosinophil % 0.4 %      Basophil % 0.4 %      Immature Grans % 0.9 %      Neutrophils, Absolute 9.89 10*3/mm3      Lymphocytes, Absolute 0.98 10*3/mm3      Monocytes, Absolute 0.42 10*3/mm3      Eosinophils, Absolute 0.05 10*3/mm3      Basophils, Absolute 0.05 10*3/mm3      Immature Grans, Absolute 0.10 10*3/mm3      nRBC 0.0 /100 WBC           Imaging Results (Last 24 Hours)       Procedure Component Value Units Date/Time    US Gallbladder [[id number]] Collected: 03/25/24 1042     Updated: 03/25/24 1117    Narrative:      PROCEDURE: US GALLBLADDER-     HISTORY: eval cholecystitis     PROCEDURE: Ultrasound images of the right upper quadrant were obtained.     FINDINGS:  The liver parenchyma is normal in echogenicity. The  gallbladder is distended  and filled with sludge and stones. There is no  gallbladder wall thickening. There is a small amount of pericholecystic  fluid. Findings are suggestive of acute cholecystitis. The common duct  is normal measuring 5.6 mm.       Impression:      Distended gallbladder filled with stones and sludge with  small amount of pericholecystic fluid. Findings are suggestive of acute  cholecystitis.                 Images were reviewed, interpreted, and dictated by Dr. Janessa Mireles MD  Transcribed by Polina Carr PA-C.     This report was signed and finalized on 3/25/2024 11:15 AM by Janessa Mireles MD.                Operative/Procedure Notes (last 24 hours)        Berta Clifton DO at 03/25/24 1703          PATIENT:     Mercy Clemens    DATE OF SURGERY:     3/25/2024    PHYSICIAN:   Berta Clifton DO    REFERRING PHYSICIAN:  Carolina Soto APRN    YOB: 1955    PREOPERATIVE DIAGNOSIS:  Acute cholecystitis    POSTOPERATIVE DIAGNOSIS:  Acute gangrenous cholecystitis    PROCEDURE:  Robotic assisted laparoscopic cholecystectomy with ICG    EBL:  Less than 50 cc    COMPLICATIONS:  None    ANESTHESIA:  General endotracheal.    HISTORY:  68 year old female presented to the ED with complaint of abdominal pain for 24 hours. History, physical, laboratory, and imaging studies consistent with acute cholecystitis.      OPERATIVE PROCEDURE:  The patient was seen in the preoperative area. History and physical was reviewed, consent was verified, and all questions were answered. IV ICG was injected preoperatively. The patient was taken to the operating room, placed in the supine position, and given general endotracheal anesthesia.  The patient was prepped and draped in the normal sterile fashion, and also received preoperative IV antibiotics.  An appropriate timeout was performed by the nursing staff prior to the incision.    A periumbilical incision was made and deepened to the fascia. Utilizing a modified Sara  technique, the abdomen was entered under direct visualization. An 8mm robotic trocar was inserted and the abdomen was insufflated to a pressure of 15 mmHg. Patient tolerated insufflation well. A laparoscope was inserted and the abdomen was inspected. No injury from initial port placement was identified. Additional ports were placed under direct visualization in the following fashion: two 8mm ports in the left upper abdomen and one 8 mm port in the upper right abdomen. The ATG Accessi robot was draped in sterile fashion and was docked to the ports in standard fashion.     The gallbladder was identified and noted to be gangrenous, enlarged, and grossly distended. A hole was made in the gallbladder fundus and bile was suctioned out to help facilitate grasping and retraction.The fundus of the gallbladder was grasped and retracted toward the patient's right shoulder. The gallbladder was visualized completely and noted to be gangrenous, edematous, and acutely inflamed. The gallbladder infundibulum was grasped and retracted toward the right lower quadrant of the abdomen to expose the hilar structures. Dissection in the triangle of Calot was carried out until the critical view of safety was obtained, identifying the cystic duct and artery as the only two structures entering the gallbladder. ICG was used to visualize the biliary anatomy. Two hemoclips were placed proximally on the cystic duct and one distally. The duct was divided. The cystic artery was similarly clipped and divided. Bovie electrocautery was then used to remove the gallbladder from the liver bed.  It was then placed into a laparoscopic retrieval bag and removed via the periumbilical port.     The gallbladder fossa was inspected. Bile spillage occurred during the procedure and this was copiously irrigated and suctioned. Hemostasis was excellent and there was no evidence of biliary leakage. All trocar sites were injected with a local anesthetic mixture.  Trocars  were removed under direct vision and the fascia was closed with 0-Vicryl suture and 4-0 Vicryl subcuticular stitch. The skin was dressed with surgical glue.    The patient was stable at this point and subsequently transferred back to the recovery room in stable condition.    Berta Clifton DO    Electronically signed by Berta Clifton DO at 03/25/24 1914       Physician Progress Notes (last 24 hours)  Notes from 03/25/24 0725 through 03/26/24 0725   No notes of this type exist for this encounter.       Consult Notes (last 24 hours)  Notes from 03/25/24 0725 through 03/26/24 0725   No notes of this type exist for this encounter.

## 2024-03-26 NOTE — TELEPHONE ENCOUNTER
NO PA REQ FOR LAP CATHLEEN WITH DR. GONGORA ON 03/25  VERIFIED W/ [employer]  [id number]- REF # [id number]

## 2024-03-26 NOTE — CASE MANAGEMENT/SOCIAL WORK
Discharge Planning Assessment   [place]     Patient Name: Mercy Clemens  MRN: [id number]  Today's Date: 3/26/2024    Admit Date: 3/25/2024    Plan: Met with pt for dcp, pt provided demographics. She does not have an AD, POA, or financial concerns. Home dme is a glucometer and bp cuff. [facility] Soto is her primary and [facility] is preferred pharmacy, enrolled in meds to bed. She and her  have been at current residence for 9 yrs. Pt is independent, drives. Denies dc needs/concerns. Plans to return home at [place] with  driving.   Discharge Needs Assessment       Row Name 03/26/24 1026       Living Environment    People in Home spouse    Name(s) of People in Home yosi clemens    Current Living Arrangements home    Duration at Residence 9 yrs    Potentially Unsafe Housing Conditions none    In the past 12 months has the electric, gas, oil, or water company threatened to shut off services in your home? No    Primary Care Provided by self    Family Caregiver if Needed spouse    Quality of Family Relationships helpful;involved    Able to Return to Prior Arrangements yes       Resource/Environmental Concerns    Transportation Concerns none       Transportation Needs    In the past 12 months, has lack of transportation kept you from medical appointments or from getting medications? no    In the past 12 months, has lack of transportation kept you from meetings, work, or from getting things needed for daily living? No       Food Insecurity    Within the past 12 months, you worried that your food would run out before you got the money to buy more. Never true    Within the past 12 months, the food you bought just didn't last and you didn't have money to get more. Never true       Transition Planning    Patient/Family Anticipates Transition to home with family    Patient/Family Anticipated Services at Transition none    Transportation Anticipated family or friend will provide       Discharge Needs Assessment     Readmission Within the Last 30 Days no previous admission in last 30 days    Equipment Currently Used at Home bp cuff    Concerns to be Addressed denies needs/concerns at this time    Anticipated Changes Related to Illness none    Equipment Needed After Discharge none                   Discharge Plan       Row Name 03/26/24 1028       Plan    Plan Met with pt for dcp, pt provided demographics. She does not have an AD, POA, or financial concerns. Home dme is a glucometer and bp cuff. [facility] is her primary and [facility] is preferred pharmacy, enrolled in [employer] to bed. She and her  have been at current residence for 9 yrs. Pt is independent, drives. Denies dc needs/concerns. Plans to return home at [place] with  driving.                  Continued Care and Services - Admitted Since 3/25/2024    No active coordination exists for this encounter.       Expected Discharge Date and Time       Expected Discharge Date Expected Discharge Time    Mar 26, 2024            Demographic Summary       Row Name 03/26/24 1024       General Information    Admission Type observation    Arrived From emergency department    Required Notices Provided Observation Status Notice    Referral Source admission list    Reason for Consult discharge planning    Preferred Language English       Contact Information    Permission Granted to Share Info With family/designee                   Functional Status       Row Name 03/26/24 1025       Functional Status    Usual Activity Tolerance good    Current Activity Tolerance good       Physical Activity    On average, how many days per week do you engage in moderate to strenuous exercise (like a brisk walk)? 0 days    On average, how many minutes do you engage in exercise at this level? 0 min    Number of minutes of exercise per week 0       Functional Status, IADL    Medications independent    Meal Preparation independent    Housekeeping independent    Laundry independent    Shopping  independent       Mental Status    General Appearance WDL WDL       Mental Status Summary    Recent Changes in Mental Status/Cognitive Functioning no changes       Employment/    Employment Status retired                   Psychosocial    No documentation.                  Abuse/Neglect    No documentation.                  Legal    No documentation.                  Substance Abuse    No documentation.                  Patient Forms    No documentation.                     Lindsay Ramirez RN

## 2024-03-26 NOTE — CONSULTS
Patient: Mercy Clemens  Procedure(s):  CHOLECYSTECTOMY LAPAROSCOPIC WITH DAVINCI ROBOT  Anesthesia type: general    Patient location: [facility] Surgical Floor  Last vitals:   Vitals:    03/26/24 0900   BP: 97/56   Pulse:    Resp:    Temp:    SpO2:      Level of consciousness: awake, alert, and oriented    Post-anesthesia pain: adequate analgesia  Airway patency: patent  Respiratory: unassisted  Cardiovascular: stable and blood pressure at baseline  Hydration: euvolemic    Anesthetic complications: no

## 2024-03-26 NOTE — DISCHARGE SUMMARY
[facility]   DISCHARGE SUMMARY      Name:  Mercy Clemens   Age:  68 y.o.  Sex:  female  :  1955  MRN:  [id number]   Visit Number:  [id number]    Admission Date:  3/25/2024  Date of Discharge:  3/26/2024  Primary Care Physician:  Carolina Soto APRN    Important issues to note:    -Patient admitted per general surgeon Dr. Clifton due to acute cholecystitis which was noted to be gangrenous with cholecystectomy.  -She was given Flagyl and Rocephin during admission.  Will be discharged home on Levaquin and Flagyl.  -Acute UTI noted during admission.  Levaquin sensitive with prior urine cultures.  -Patient otherwise with reassuring labs and vitals noted.  Slight leukocytosis and elevation of procal upon discharge.  Otherwise patient eating and drinking well and requesting to be discharged.  Would recommend repeat CBC with PCP follow-up in 3 days.  -Continue to hold HCTZ/lisinopril due to slight hypotension during admission.  -Strict return precautions given.    Discharge Diagnoses:     Acute gangrenous cholecystitis status post cholecystectomy  Acute UTI, POA  Essential hypertension  Diabetes mellitus type 2  Fibromyalgia  Hyperlipidemia    Problem List:     Active Hospital Problems    Diagnosis  POA    **Status post cholecystectomy [Z90.49]  Not Applicable      Resolved Hospital Problems   No resolved problems to display.     Presenting Problem:    Chief Complaint   Patient presents with    Abdominal Pain    Back Pain      Consults:     Consulting Physician(s)         Provider   Role Specialty     Berta Clifton DO      Consulting Physician General Surgery          Procedures Performed:    Procedure(s):  CHOLECYSTECTOMY LAPAROSCOPIC WITH DAVINCI ROBOT    History of presenting illness/Hospital Course:    Mercy Clemens is a 68-year-old woman with past medical history of type 2 diabetes, hypertension, chronic pain fibromyalgia, hyperlipidemia, and osteopenia.  Presented to [facility]  ED on 3/25/2024 with concern for abdominal pain.      ED summary: Afebrile, vital signs stable on room air.  Blood glucose 179, otherwise CMP unremarkable.  Lactate not elevated, lipase not elevated.  White count 11.  Hemoglobin 17.  Pattern of infection on urinalysis.  Ultrasound gallbladder distended gallbladder filled with stones and sludge with small amount of pericholecystic fluid, suggestive of acute cholecystitis.  She was provided Rocephin, Flagyl, Toradol, morphine.  Brought to the ED by general surgery-Dr. Clifton for laparoscopic cholecystectomy.  Gallbladder noted to be gangrenous, edematous, acutely inflamed.  Bile spillage occurred, copiously irrigated and suctioned.  Hospitalist was contacted by Dr. Clifton requesting observation admission given gangrenous nature of gallbladder with bile spillage.  Patient with otherwise reassuring labs and vitals noted.  She will be continued on Levaquin and Flagyl outpatient.  She was noted to have UTI during admission as well.  Prior urine cultures noted to be sensitive to Levaquin.  Advised to continue holding antihypertensive medication at this time given slight hypotension.  She will follow-up with her PCP in 3 days.  Would suggest repeat CBC in 3 days.  Strict return precautions given.    Vital Signs:    Temp:  [97.2 °F (36.2 °C)-99.2 °F (37.3 °C)] 99 °F (37.2 °C)  Heart Rate:  [57-98] 75  Resp:  [12-18] 14  BP: ()/(54-85) 103/65    Physical Exam:    General Appearance:  Alert and cooperative.  Middle aged female.  Appears well.   Head:  Atraumatic and normocephalic.   Eyes: Conjunctivae and sclerae normal, no icterus. No pallor.   Ears:  Ears with no abnormalities noted.   Throat: No oral lesions, no thrush, oral mucosa moist.   Neck: Supple, trachea midline, no thyromegaly.   Back:   No kyphoscoliosis present. No tenderness to palpation.   Lungs:   Breath sounds heard bilaterally equally.  No crackles or wheezing. No Pleural rub or bronchial breathing.    Heart:  Normal S1 and S2, no mur  On room air unlabored.mur, no gallop, no rub. No JVD.   Abdomen:   Normal bowel sounds, no masses, no organomegaly. Soft, nontender, nondistended, no rebound tenderness.  Lap sites noted and glued.  Minimal soreness noted.   Extremities: Supple, no edema, no cyanosis, no clubbing.   Pulses: Pulses palpable bilaterally.   Skin: No bleeding or rash.   Neurologic: Alert and oriented x 3. No facial asymmetry. Moves all four limbs. No tremors.     Pertinent Lab Results:     Results from last 7 days   Lab Units 03/26/24  0626 03/25/24  0950 03/22/24  1043   SODIUM mmol/L 139 139 145   POTASSIUM mmol/L 4.5 4.2 3.8   CHLORIDE mmol/L 107 101 105   CO2 mmol/L 21.5* 23.4 30.0*   BUN mg/dL 16 11 14   CREATININE mg/dL 0.79 0.67 1.02*   CALCIUM mg/dL 7.9* 9.4 9.6   BILIRUBIN mg/dL 0.6 0.6  --    ALK PHOS U/L 35* 41  --    ALT (SGPT) U/L 19 14  --    AST (SGOT) U/L 29 19  --    GLUCOSE mg/dL 225* 179* 148*     Results from last 7 days   Lab Units 03/26/24  0626 03/25/24  0950   WBC 10*3/mm3 15.34* 11.49*   HEMOGLOBIN g/dL 15.0 17.0*   HEMATOCRIT % 43.9 48.1*   PLATELETS 10*3/mm3 175 195     Results from last 7 days   Lab Units 03/25/24  0950   INR  0.97                 Results from last 7 days   Lab Units 03/25/24  0950   LIPASE U/L 30               Pertinent Radiology Results:    Imaging Results (All)       Procedure Component Value Units Date/Time    US Gallbladder [[id number]] Collected: 03/25/24 1042     Updated: 03/25/24 1117    Narrative:      PROCEDURE: US GALLBLADDER-     HISTORY: eval cholecystitis     PROCEDURE: Ultrasound images of the right upper quadrant were obtained.     FINDINGS:  The liver parenchyma is normal in echogenicity. The  gallbladder is distended and filled with sludge and stones. There is no  gallbladder wall thickening. There is a small amount of pericholecystic  fluid. Findings are suggestive of acute cholecystitis. The common duct  is normal measuring 5.6 mm.        Impression:      Distended gallbladder filled with stones and sludge with  small amount of pericholecystic fluid. Findings are suggestive of acute  cholecystitis.                 Images were reviewed, interpreted, and dictated by Dr. Janessa Mireles MD  Transcribed by Polina Carr PA-C.     This report was signed and finalized on 3/25/2024 11:15 AM by Janessa Mireles MD.               Echo:      Condition on Discharge:      Stable.    Code status during the hospital stay:    Code Status and Medical Interventions:   Ordered at: 03/25/24 1951     Code Status (Patient has no pulse and is not breathing):    CPR (Attempt to Resuscitate)     Medical Interventions (Patient has pulse or is breathing):    Full Support     Discharge Disposition:    Home or Self Care    Discharge Medications:       Discharge Medications        New Medications        Instructions Start Date   HYDROcodone-acetaminophen 5-325 MG per tablet  Commonly known as: NORCO   1 tablet, Oral, Every 6 Hours PRN      levoFLOXacin 750 MG tablet  Commonly known as: Levaquin   750 mg, Oral, Daily      metroNIDAZOLE 500 MG tablet  Commonly known as: Flagyl   500 mg, Oral, 3 Times Daily             Changes to Medications        Instructions Start Date   losartan-hydrochlorothiazide 100-25 MG per tablet  Commonly known as: HYZAAR  What changed: additional instructions   1 tablet, Oral, Daily, Hold unless BP > 140             Continue These Medications        Instructions Start Date   Accu-Chek Guide test strip  Generic drug: glucose blood   TEST BLOOD SUGAR THREE TIMES DAILY      alendronate 70 MG tablet  Commonly known as: FOSAMAX   70 mg      B-D SINGLE USE SWABS REGULAR pads   Use 3 times a day with insulin injections.      B-D UF III MINI PEN NEEDLES 31G X 5 MM misc  Generic drug: Insulin Pen Needle   USE ONE PEN NEEDLE TO GIVE INSULIN SHOTS FOUR TIMES A DAY      Blood Glucose Monitoring Suppl w/Device kit   Check tid E11.9 wants accu-check guide meter       Diclofenac Sodium 1 % gel gel  Commonly known as: VOLTAREN   As Needed.      DULoxetine 60 MG capsule  Commonly known as: CYMBALTA   60 mg, Oral, Daily      FreeStyle James 2 [place] device   1 Device, Does not apply, Every 14 Days      FreeStyle James 2 Sensor misc   1 Device, Does not apply, Every 14 Days       MG tablet  Generic drug: ibuprofen   TAKE 1 TABLET BY MOUTH EVERY 8 (EIGHT) HOURS AS NEEDED FOR MODERATE PAIN .      Lancets misc   Check three times daily      metoprolol succinate XL 25 MG 24 hr tablet  Commonly known as: TOPROL-XL   TAKE 1 TABLET EVERY DAY      NovoLOG FlexPen 100 UNIT/ML solution pen-injector sc pen  Generic drug: insulin aspart   Patient uses 20 units with each meal. 20 units 4 times daily.      pregabalin 150 MG capsule  Commonly known as: LYRICA   TAKE 1 CAPSULE TWICE DAILY      Tirzepatide 5 MG/0.5ML solution pen-injector pen  Commonly known as: Mounjaro   5 mg, Subcutaneous, Weekly             Stop These Medications      empagliflozin 10 MG tablet tablet  Commonly known as: Jardiance     Jardiance 10 MG tablet tablet  Generic drug: empagliflozin     meloxicam 15 MG tablet  Commonly known as: MOBIC            Discharge Diet:     Diet Instructions       Diet: Cardiac Diets, Diabetic Diets; Healthy Heart (2-3 Na+); Regular (IDDSI 7); Thin (IDDSI 0); Consistent Carbohydrate      Discharge Diet:  Cardiac Diets  Diabetic Diets       Cardiac Diet: Healthy Heart (2-3 Na+)    Texture: Regular (IDDSI 7)    Fluid Consistency: Thin (IDDSI 0)    Diabetic Diet: Consistent Carbohydrate          Activity at Discharge:     Activity Instructions       Other Activity Instructions      Activity Instructions: per general surgeon          Follow-up Appointments:     Follow-up Information       Berta Clifton, DO Follow up in 10 day(s).    Specialty: General Surgery  Contact information:  [address]  [facility] [address] [phone]               Carolina Soto APRN  Follow up in 3 day(s).    Specialty: Nurse Practitioner  Contact information:  [address]  [facility] [address] [phone]                           No future appointments.  Test Results Pending at Discharge:    Pending Labs       Order Current Status    TISSUE EXAM, [facility] ([facility]) In process               Emma Cottrell, APRN  03/26/24  12:54 EDT    Time: I spent 40 minutes on this discharge activity which included: face-to-face encounter with the patient, reviewing the data in the system, coordination of the care with the nursing staff as well as consultants, documentation, and entering orders.     Dictated utilizing Dragon dictation.

## 2024-03-26 NOTE — PLAN OF CARE
Goal Outcome Evaluation:  Plan of Care Reviewed With: patient        Progress: improving  Outcome Evaluation: New admit post op, VSS, pain controlled, pt ambulating in room, tolerating advance diet

## 2024-03-26 NOTE — PLAN OF CARE
Goal Outcome Evaluation:  Plan of Care Reviewed With: patient, family           Outcome Evaluation: Patient to discharge home today.  at bedside and will transport home.

## 2024-03-26 NOTE — ANESTHESIA POSTPROCEDURE EVALUATION
Patient: Mercy Clemens    Procedure Summary       Date: 03/25/24 Room / Location: [facility] OR 2 /  [facility]    Anesthesia Start: 1646 Anesthesia Stop: 1910    Procedure: CHOLECYSTECTOMY LAPAROSCOPIC WITH DAVINCI ROBOT (Abdomen) Diagnosis:     Surgeons: Berta Clifton DO Provider: Luis Alvarenga CRNA    Anesthesia Type: general ASA Status: 3 - Emergent            Anesthesia Type: general    Vitals  Vitals Value Taken Time   /54 03/25/24 2017   Temp 98.5 °F (36.9 °C) 03/25/24 2015   Pulse 92 03/25/24 2017   Resp 16 03/25/24 2015   SpO2 93 % 03/25/24 2017   Vitals shown include unfiled device data.        Post Anesthesia Care and Evaluation    Patient location during evaluation: PACU  Patient participation: complete - patient participated  Level of consciousness: awake and alert  Pain score: 1  Pain management: adequate    Airway patency: patent  Anesthetic complications: No anesthetic complications  PONV Status: none  Cardiovascular status: acceptable  Respiratory status: acceptable  Hydration status: acceptable  No anesthesia care post op

## 2024-03-26 NOTE — CASE MANAGEMENT/SOCIAL WORK
Case Management Discharge Note                Selected Continued Care - Admitted Since 3/25/2024       Destination    No services have been selected for the patient.                Durable Medical Equipment    No services have been selected for the patient.                Dialysis/Infusion    No services have been selected for the patient.                Home Medical Care    No services have been selected for the patient.                Therapy    No services have been selected for the patient.                Community Resources    No services have been selected for the patient.                [facility]    No services have been selected for the patient.                    Transportation Services  Private: Car    Final Discharge Disposition Code: 01 - home or self-care

## 2024-03-26 NOTE — PROGRESS NOTES
LOS: 0 days   Patient Care Team:  Carolina Soto APRN as PCP - General (Family Medicine)       Chief Complaint: POD1 robotic assisted laparoscopic cholecystectomy for gangrenous cholecystitis    HPI: Patient seen and examined at bedside. No acute events overnight. She states she feels well and has mild abdominal soreness, but it is much improved from yesterday. She is tolerating a regular diet and denies nausea and vomiting. She has been ambulating.     Vital Signs  Temp:  [97.2 °F (36.2 °C)-99.2 °F (37.3 °C)] 99 °F (37.2 °C)  Heart Rate:  [57-98] 75  Resp:  [12-18] 14  BP: ()/(54-85) 103/65    Physical Exam:     General Appearance:  Alert and cooperative, not in any acute distress.   Respiratory/Lungs:   Breath sounds heard bilaterally equally.  No crackles or wheezing. No Pleural rub or bronchial breathing. Normal respiratory effort.    Cardiovascular/Heart:  Normal S1 and S2, no murmur. No edema   GI/Abdomen:   Obese, no masses, no hepatosplenomegaly. Soft, non-tender, non-distended, no hernia, incisions are clean, dry, and intact                 Musculoskeletal/ Extremities:   Moves all extremities well   Skin: No bleeding, bruising or rash, no induration   Psychiatric : Alert and oriented ×3.  No depression or anxiety    Neurologic: Cranial nerves 2 - 12 grossly intact, sensation intact, Motor power is normal and equal bilaterally.     Results Review:       Lab Results (last 24 hours)       Procedure Component Value Units Date/Time    POC Glucose 4x Daily Before Meals & at Bedtime [[id number]]  (Abnormal) Collected: 03/26/24 1121    Specimen: Blood Updated: 03/26/24 1125     Glucose 169 mg/dL      Comment: Serial Number: [id number]:  [id number]       TISSUE EXAM, [facility] ([facility]) [[id number]] Collected: 03/25/24 1852    Specimen: Tissue from Gallbladder Updated: 03/26/24 0905    CBC Auto Differential [[id number]]  (Abnormal) Collected: 03/26/24 0626    Specimen: Blood Updated: 03/26/24  "0734     WBC 15.34 10*3/mm3      RBC 4.76 10*6/mm3      Hemoglobin 15.0 g/dL      Hematocrit 43.9 %      MCV 92.2 fL      MCH 31.5 pg      MCHC 34.2 g/dL      RDW 13.2 %      RDW-SD 45.1 fl      MPV 10.5 fL      Platelets 175 10*3/mm3      Neutrophil % 87.7 %      Lymphocyte % 4.0 %      Monocyte % 7.3 %      Eosinophil % 0.0 %      Basophil % 0.2 %      Immature Grans % 0.8 %      Neutrophils, Absolute 13.44 10*3/mm3      Lymphocytes, Absolute 0.62 10*3/mm3      Monocytes, Absolute 1.12 10*3/mm3      Eosinophils, Absolute 0.00 10*3/mm3      Basophils, Absolute 0.03 10*3/mm3      Immature Grans, Absolute 0.13 10*3/mm3      nRBC 0.0 /100 WBC     POC Glucose Once [[id number]]  (Abnormal) Collected: 03/26/24 0723    Specimen: Blood Updated: 03/26/24 0725     Glucose 208 mg/dL      Comment: Serial Number: [id number]:  [id number]       Procalcitonin [[id number]]  (Abnormal) Collected: 03/26/24 0626    Specimen: Blood Updated: 03/26/24 0709     Procalcitonin 0.36 ng/mL     Narrative:      As a Marker for Sepsis (Non-Neonates):    1. <0.5 ng/mL represents a low risk of severe sepsis and/or septic shock.  2. >2 ng/mL represents a high risk of severe sepsis and/or septic shock.    As a Marker for Lower Respiratory Tract Infections that require antibiotic therapy:    PCT on Admission    Antibiotic Therapy       6-12 Hrs later    >0.5                Strongly Recommended  >0.25 - <0.5        Recommended   0.1 - 0.25          Discouraged              Remeasure/reassess PCT  <0.1                Strongly Discouraged     Remeasure/reassess PCT    As 28 day mortality risk marker: \"Change in Procalcitonin Result\" (>80% or <=80%) if Day 0 (or Day 1) and Day 4 values are available. Refer to http://www.[facility]-pct-calculator.com    Change in PCT <=80%  A decrease of PCT levels below or equal to 80% defines a positive change in PCT test result representing a higher risk for 28-day all-cause mortality of patients diagnosed with severe " sepsis for septic shock.    Change in PCT >80%  A decrease of PCT levels of more than 80% defines a negative change in PCT result representing a lower risk for 28-day all-cause mortality of patients diagnosed with severe sepsis or septic shock.       Comprehensive Metabolic Panel [[id number]]  (Abnormal) Collected: 03/26/24 0626    Specimen: Blood Updated: 03/26/24 0703     Glucose 225 mg/dL      Comment: Glucose >180, Hemoglobin A1C recommended.        BUN 16 mg/dL      Creatinine 0.79 mg/dL      Sodium 139 mmol/L      Potassium 4.5 mmol/L      Comment: Slight hemolysis detected by analyzer. Result may be falsely elevated.        Chloride 107 mmol/L      CO2 21.5 mmol/L      Calcium 7.9 mg/dL      Total Protein 5.3 g/dL      Albumin 3.2 g/dL      ALT (SGPT) 19 U/L      AST (SGOT) 29 U/L      Alkaline Phosphatase 35 U/L      Total Bilirubin 0.6 mg/dL      Globulin 2.1 gm/dL      A/G Ratio 1.5 g/dL      BUN/Creatinine Ratio 20.3     Anion Gap 10.5 mmol/L      eGFR 82.1 mL/min/1.73     Narrative:      GFR Normal >60  Chronic Kidney Disease <60  Kidney Failure <15      POC Glucose Once [[id number]]  (Abnormal) Collected: 03/25/24 1532    Specimen: Blood Updated: 03/25/24 1547     Glucose 137 mg/dL      Comment: Serial Number: [id number]:  [id number]                   Assessment & Plan       Status post cholecystectomy    Patient is POD1 from robotic assisted laparoscopic cholecystectomy for acute gangrenous cholecystitis. She is doing well post operatively and is surgically stable for discharge today. I reviewed her laboratory studies and liver function tests are within normal limits. Expected leukocytosis secondary to surgical intervention. Will discharge patient on PO antibiotics as she also has a UTI. I discussed that the patient needs to be careful about heavy lifting. I will see her in the office for a post operative visit in 7-10 days. Patient expresses understanding and all of her questions were answered.      Berta Clifton,   03/26/24  12:13 EDT

## 2024-03-27 ENCOUNTER — TRANSITIONAL CARE MANAGEMENT TELEPHONE ENCOUNTER (OUTPATIENT)
Dept: CALL CENTER | Facility: HOSPITAL | Age: 69
End: 2024-03-27
Payer: MEDICARE

## 2024-03-27 LAB — REF LAB TEST METHOD: NORMAL

## 2024-03-27 NOTE — OUTREACH NOTE
Call Center TCM Note      Flowsheet Row Responses   [facility] patient discharged from? [facility]   Does the patient have one of the following disease processes/diagnoses(primary or secondary)? General Surgery   TCM attempt successful? Yes  [verbal release for Leonidas, ]   Call start time 1436   Call end time 1444   Discharge diagnosis Acute gangrenous cholecystitis status post cholecystectomy   Meds reviewed with patient/caregiver? Yes   Is the patient having any side effects they believe may be caused by any medication additions or changes? No   Does the patient have all medications related to this admission filled (includes all antibiotics, pain medications, etc.) Yes   Prescription comments Reviewed med changes on AVS---pt was unaware of Jardiance discontinued and aware of mobic, to discuss with PCP at f/u as she reports she has taken jardiance for several years, will bring her BG readings to f/u   Is the patient taking all medications as directed (includes completed medication regime)? Yes   Medication comments Pt aware of parameters with losartan--encouraged to bring readings to f/u appt for review   Comments Hospital f/u on 4/2/24@1115am, Surgical f/u on 4/5/24   Does the patient have an appointment with their PCP within 7-14 days of discharge? Yes   Has home health visited the patient within 72 hours of discharge? N/A   Psychosocial issues? No   Did the patient receive a copy of their discharge instructions? Yes   Nursing interventions Reviewed instructions with patient   What is the patient's perception of their health status since discharge? Improving  [Pt with pain controlled, tolerating po intake, having BMs and passing flatus.]   Nursing interventions Nurse provided patient education  [routine post op care reviewed with pt]   Is the patient /caregiver able to teach back basic post-op care? Continue use of incentive spirometry at least 1 week post discharge, Lifting as instructed by MD in  discharge instructions, No tub bath, swimming, or hot tub until instructed by MD, Keep incision areas clean,dry and protected   Is the patient/caregiver able to teach back signs and symptoms of incisional infection? Increased redness, swelling or pain at the incisonal site, Increased drainage or bleeding, Incisional warmth, Pus or odor from incision, Fever  [reviewed]   Is the patient/caregiver able to teach back steps to recovery at home? Rest and rebuild strength, gradually increase activity, Eat a well-balance diet   Is the patient/caregiver able to teach back the hierarchy of who to call/visit for symptoms/problems? PCP, Specialist, Home health nurse, Urgent Care, ED, 911 Yes   TCM call completed? Yes   Call end time 1444            Sena Ram RN    3/27/2024, 14:48 EDT

## 2024-03-27 NOTE — PAYOR COMM NOTE
"  D/C summary  UR Manager; Germaine Angulo, [phone] and fax [phone]      Mercy Skaggs (68 y.o. Female)       Date of Birth   1955    Social Security Number       Address   [address] [place], [place] [address]    Home Phone   [phone]    MRN   [id number]       Gnosticism   Lutheran    Marital Status                               Admission Date   3/25/24    Admission Type   Emergency    Admitting Provider   Berta Clifton DO    Attending Provider       Department, Room/Bed   [facility] OB GYN, W209/1       Discharge Date   3/26/2024    Discharge Disposition   Home or Self Care    Discharge Destination                                 Attending Provider: (none)   Allergies: Penicillins    Isolation: None   Infection: None   Code Status: Prior    Ht: 157.5 cm (62\")   Wt: 83.4 kg (183 lb 13.8 oz)    Admission Cmt: None   Principal Problem: Status post cholecystectomy [Z90.49]                   Active Insurance as of 3/25/2024       Primary Coverage       Payor Plan Insurance Group Employer/Plan Group    HUMANA MEDICARE REPLACEMENT HU[employer] MED ADV GROUP [id number]       Payor Plan Address Payor Plan Phone Number Payor Plan Fax Number Effective Dates    [address] [address] [phone]  6/1/2021 - None Entered    [facility] [id number]         Subscriber Name Subscriber Birth Date Member ID       MERCY SKAGGS 1955 [id number]                     Emergency Contacts        (Rel.) Home Phone Work Phone Mobile Phone    Leonidas Skaggs (Spouse) -- -- [phone]                 Physician Progress Notes (last 24 hours)        Berta Clifton DO at 03/26/24 1213               LOS: 0 days   Patient Care Team:  Carolina Soto APRN as PCP - General (Family Medicine)       Chief Complaint: POD1 robotic assisted laparoscopic cholecystectomy for gangrenous cholecystitis    HPI: Patient seen and examined at bedside. No acute events overnight. She states she feels well and " has mild abdominal soreness, but it is much improved from yesterday. She is tolerating a regular diet and denies nausea and vomiting. She has been ambulating.     Vital Signs  Temp:  [97.2 °F (36.2 °C)-99.2 °F (37.3 °C)] 99 °F (37.2 °C)  Heart Rate:  [57-98] 75  Resp:  [12-18] 14  BP: ()/(54-85) 103/65    Physical Exam:     General Appearance:  Alert and cooperative, not in any acute distress.   Respiratory/Lungs:   Breath sounds heard bilaterally equally.  No crackles or wheezing. No Pleural rub or bronchial breathing. Normal respiratory effort.    Cardiovascular/Heart:  Normal S1 and S2, no murmur. No edema   GI/Abdomen:   Obese, no masses, no hepatosplenomegaly. Soft, non-tender, non-distended, no hernia, incisions are clean, dry, and intact                 Musculoskeletal/ Extremities:   Moves all extremities well   Skin: No bleeding, bruising or rash, no induration   Psychiatric : Alert and oriented ×3.  No depression or anxiety    Neurologic: Cranial nerves 2 - 12 grossly intact, sensation intact, Motor power is normal and equal bilaterally.     Results Review:       Lab Results (last 24 hours)       Procedure Component Value Units Date/Time    POC Glucose 4x Daily Before Meals & at Bedtime [[id number]]  (Abnormal) Collected: 03/26/24 1121    Specimen: Blood Updated: 03/26/24 1125     Glucose 169 mg/dL      Comment: Serial Number: [id number]:  [id number]       TISSUE EXAM, [facility] ([facility]) [[id number]] Collected: 03/25/24 1852    Specimen: Tissue from Gallbladder Updated: 03/26/24 0905    CBC Auto Differential [[id number]]  (Abnormal) Collected: 03/26/24 0626    Specimen: Blood Updated: 03/26/24 0734     WBC 15.34 10*3/mm3      RBC 4.76 10*6/mm3      Hemoglobin 15.0 g/dL      Hematocrit 43.9 %      MCV 92.2 fL      MCH 31.5 pg      MCHC 34.2 g/dL      RDW 13.2 %      RDW-SD 45.1 fl      MPV 10.5 fL      Platelets 175 10*3/mm3      Neutrophil % 87.7 %      Lymphocyte % 4.0 %      Monocyte % 7.3 %   "    Eosinophil % 0.0 %      Basophil % 0.2 %      Immature Grans % 0.8 %      Neutrophils, Absolute 13.44 10*3/mm3      Lymphocytes, Absolute 0.62 10*3/mm3      Monocytes, Absolute 1.12 10*3/mm3      Eosinophils, Absolute 0.00 10*3/mm3      Basophils, Absolute 0.03 10*3/mm3      Immature Grans, Absolute 0.13 10*3/mm3      nRBC 0.0 /100 WBC     POC Glucose Once [[id number]]  (Abnormal) Collected: 03/26/24 0723    Specimen: Blood Updated: 03/26/24 0725     Glucose 208 mg/dL      Comment: Serial Number: [id number]:  [id number]       Procalcitonin [[id number]]  (Abnormal) Collected: 03/26/24 0626    Specimen: Blood Updated: 03/26/24 0709     Procalcitonin 0.36 ng/mL     Narrative:      As a Marker for Sepsis (Non-Neonates):    1. <0.5 ng/mL represents a low risk of severe sepsis and/or septic shock.  2. >2 ng/mL represents a high risk of severe sepsis and/or septic shock.    As a Marker for Lower Respiratory Tract Infections that require antibiotic therapy:    PCT on Admission    Antibiotic Therapy       6-12 Hrs later    >0.5                Strongly Recommended  >0.25 - <0.5        Recommended   0.1 - 0.25          Discouraged              Remeasure/reassess PCT  <0.1                Strongly Discouraged     Remeasure/reassess PCT    As 28 day mortality risk marker: \"Change in Procalcitonin Result\" (>80% or <=80%) if Day 0 (or Day 1) and Day 4 values are available. Refer to http://www.[facility]-pct-calculator.com    Change in PCT <=80%  A decrease of PCT levels below or equal to 80% defines a positive change in PCT test result representing a higher risk for 28-day all-cause mortality of patients diagnosed with severe sepsis for septic shock.    Change in PCT >80%  A decrease of PCT levels of more than 80% defines a negative change in PCT result representing a lower risk for 28-day all-cause mortality of patients diagnosed with severe sepsis or septic shock.       Comprehensive Metabolic Panel [[id number]]  (Abnormal) " Collected: 03/26/24 0626    Specimen: Blood Updated: 03/26/24 0703     Glucose 225 mg/dL      Comment: Glucose >180, Hemoglobin A1C recommended.        BUN 16 mg/dL      Creatinine 0.79 mg/dL      Sodium 139 mmol/L      Potassium 4.5 mmol/L      Comment: Slight hemolysis detected by analyzer. Result may be falsely elevated.        Chloride 107 mmol/L      CO2 21.5 mmol/L      Calcium 7.9 mg/dL      Total Protein 5.3 g/dL      Albumin 3.2 g/dL      ALT (SGPT) 19 U/L      AST (SGOT) 29 U/L      Alkaline Phosphatase 35 U/L      Total Bilirubin 0.6 mg/dL      Globulin 2.1 gm/dL      A/G Ratio 1.5 g/dL      BUN/Creatinine Ratio 20.3     Anion Gap 10.5 mmol/L      eGFR 82.1 mL/min/1.73     Narrative:      GFR Normal >60  Chronic Kidney Disease <60  Kidney Failure <15      POC Glucose Once [[id number]]  (Abnormal) Collected: 03/25/24 1532    Specimen: Blood Updated: 03/25/24 1547     Glucose 137 mg/dL      Comment: Serial Number: [id number]:  [id number]                   Assessment & Plan       Status post cholecystectomy    Patient is POD1 from robotic assisted laparoscopic cholecystectomy for acute gangrenous cholecystitis. She is doing well post operatively and is surgically stable for discharge today. I reviewed her laboratory studies and liver function tests are within normal limits. Expected leukocytosis secondary to surgical intervention. Will discharge patient on PO antibiotics as she also has a UTI. I discussed that the patient needs to be careful about heavy lifting. I will see her in the office for a post operative visit in 7-10 days. Patient expresses understanding and all of her questions were answered.     Berta Clifton DO  03/26/24  12:13 EDT      Electronically signed by Berta Clifton DO at 03/26/24 1216          Discharge Summary        Emma Cottrell APRN at 03/26/24 125       Attestation signed by Pino Sprague DO at 03/26/24 0857    I have reviewed this documentation and  agree.                      [facility]   DISCHARGE SUMMARY      Name:  Mercy Clemens   Age:  68 y.o.  Sex:  female  :  1955  MRN:  [id number]   Visit Number:  [id number]    Admission Date:  3/25/2024  Date of Discharge:  3/26/2024  Primary Care Physician:  Carolina Soto APRN    Important issues to note:    -Patient admitted per general surgeon Dr. Clifton due to acute cholecystitis which was noted to be gangrenous with cholecystectomy.  -She was given Flagyl and Rocephin during admission.  Will be discharged home on Levaquin and Flagyl.  -Acute UTI noted during admission.  Levaquin sensitive with prior urine cultures.  -Patient otherwise with reassuring labs and vitals noted.  Slight leukocytosis and elevation of procal upon discharge.  Otherwise patient eating and drinking well and requesting to be discharged.  Would recommend repeat CBC with PCP follow-up in 3 days.  -Continue to hold HCTZ/lisinopril due to slight hypotension during admission.  -Strict return precautions given.    Discharge Diagnoses:     Acute gangrenous cholecystitis status post cholecystectomy  Acute UTI, POA  Essential hypertension  Diabetes mellitus type 2  Fibromyalgia  Hyperlipidemia    Problem List:     Active Hospital Problems    Diagnosis  POA    **Status post cholecystectomy [Z90.49]  Not Applicable      Resolved Hospital Problems   No resolved problems to display.     Presenting Problem:    Chief Complaint   Patient presents with    Abdominal Pain    Back Pain      Consults:     Consulting Physician(s)         Provider   Role Specialty     Berta Clifton DO      Consulting Physician General Surgery          Procedures Performed:    Procedure(s):  CHOLECYSTECTOMY LAPAROSCOPIC WITH DAVINCI ROBOT    History of presenting illness/Hospital Course:    Mercy Clemens is a 68-year-old woman with past medical history of type 2 diabetes, hypertension, chronic pain fibromyalgia, hyperlipidemia, and  osteopenia.  Presented to [facility] ED on 3/25/2024 with concern for abdominal pain.      ED summary: Afebrile, vital signs stable on room air.  Blood glucose 179, otherwise CMP unremarkable.  Lactate not elevated, lipase not elevated.  White count 11.  Hemoglobin 17.  Pattern of infection on urinalysis.  Ultrasound gallbladder distended gallbladder filled with stones and sludge with small amount of pericholecystic fluid, suggestive of acute cholecystitis.  She was provided Rocephin, Flagyl, Toradol, morphine.  Brought to the ED by general surgery-Dr. Clifton for laparoscopic cholecystectomy.  Gallbladder noted to be gangrenous, edematous, acutely inflamed.  Bile spillage occurred, copiously irrigated and suctioned.  Hospitalist was contacted by Dr. Clifton requesting observation admission given gangrenous nature of gallbladder with bile spillage.  Patient with otherwise reassuring labs and vitals noted.  She will be continued on Levaquin and Flagyl outpatient.  She was noted to have UTI during admission as well.  Prior urine cultures noted to be sensitive to Levaquin.  Advised to continue holding antihypertensive medication at this time given slight hypotension.  She will follow-up with her PCP in 3 days.  Would suggest repeat CBC in 3 days.  Strict return precautions given.    Vital Signs:    Temp:  [97.2 °F (36.2 °C)-99.2 °F (37.3 °C)] 99 °F (37.2 °C)  Heart Rate:  [57-98] 75  Resp:  [12-18] 14  BP: ()/(54-85) 103/65    Physical Exam:    General Appearance:  Alert and cooperative.  Middle aged female.  Appears well.   Head:  Atraumatic and normocephalic.   Eyes: Conjunctivae and sclerae normal, no icterus. No pallor.   Ears:  Ears with no abnormalities noted.   Throat: No oral lesions, no thrush, oral mucosa moist.   Neck: Supple, trachea midline, no thyromegaly.   Back:   No kyphoscoliosis present. No tenderness to palpation.   Lungs:   Breath sounds heard bilaterally equally.  No crackles or wheezing. No Pleural  rub or bronchial breathing.   Heart:  Normal S1 and S2, no mur  On room air unlabored.mur, no gallop, no rub. No JVD.   Abdomen:   Normal bowel sounds, no masses, no organomegaly. Soft, nontender, nondistended, no rebound tenderness.  Lap sites noted and glued.  Minimal soreness noted.   Extremities: Supple, no edema, no cyanosis, no clubbing.   Pulses: Pulses palpable bilaterally.   Skin: No bleeding or rash.   Neurologic: Alert and oriented x 3. No facial asymmetry. Moves all four limbs. No tremors.     Pertinent Lab Results:     Results from last 7 days   Lab Units 03/26/24  0626 03/25/24  0950 03/22/24  1043   SODIUM mmol/L 139 139 145   POTASSIUM mmol/L 4.5 4.2 3.8   CHLORIDE mmol/L 107 101 105   CO2 mmol/L 21.5* 23.4 30.0*   BUN mg/dL 16 11 14   CREATININE mg/dL 0.79 0.67 1.02*   CALCIUM mg/dL 7.9* 9.4 9.6   BILIRUBIN mg/dL 0.6 0.6  --    ALK PHOS U/L 35* 41  --    ALT (SGPT) U/L 19 14  --    AST (SGOT) U/L 29 19  --    GLUCOSE mg/dL 225* 179* 148*     Results from last 7 days   Lab Units 03/26/24  0626 03/25/24  0950   WBC 10*3/mm3 15.34* 11.49*   HEMOGLOBIN g/dL 15.0 17.0*   HEMATOCRIT % 43.9 48.1*   PLATELETS 10*3/mm3 175 195     Results from last 7 days   Lab Units 03/25/24  0950   INR  0.97                 Results from last 7 days   Lab Units 03/25/24  0950   LIPASE U/L 30               Pertinent Radiology Results:    Imaging Results (All)       Procedure Component Value Units Date/Time    US Gallbladder [[id number]] Collected: 03/25/24 1042     Updated: 03/25/24 1117    Narrative:      PROCEDURE: US GALLBLADDER-     HISTORY: eval cholecystitis     PROCEDURE: Ultrasound images of the right upper quadrant were obtained.     FINDINGS:  The liver parenchyma is normal in echogenicity. The  gallbladder is distended and filled with sludge and stones. There is no  gallbladder wall thickening. There is a small amount of pericholecystic  fluid. Findings are suggestive of acute cholecystitis. The common duct  is  normal measuring 5.6 mm.       Impression:      Distended gallbladder filled with stones and sludge with  small amount of pericholecystic fluid. Findings are suggestive of acute  cholecystitis.                 Images were reviewed, interpreted, and dictated by Dr. Janessa Mireles MD  Transcribed by Polina Carr PA-C.     This report was signed and finalized on 3/25/2024 11:15 AM by Janessa Mireles MD.               Echo:      Condition on Discharge:      Stable.    Code status during the hospital stay:    Code Status and Medical Interventions:   Ordered at: 03/25/24 1951     Code Status (Patient has no pulse and is not breathing):    CPR (Attempt to Resuscitate)     Medical Interventions (Patient has pulse or is breathing):    Full Support     Discharge Disposition:    Home or Self Care    Discharge Medications:       Discharge Medications        New Medications        Instructions Start Date   HYDROcodone-acetaminophen 5-325 MG per tablet  Commonly known as: NORCO   1 tablet, Oral, Every 6 Hours PRN      levoFLOXacin 750 MG tablet  Commonly known as: Levaquin   750 mg, Oral, Daily      metroNIDAZOLE 500 MG tablet  Commonly known as: Flagyl   500 mg, Oral, 3 Times Daily             Changes to Medications        Instructions Start Date   losartan-hydrochlorothiazide 100-25 MG per tablet  Commonly known as: HYZAAR  What changed: additional instructions   1 tablet, Oral, Daily, Hold unless BP > 140             Continue These Medications        Instructions Start Date   Accu-Chek Guide test strip  Generic drug: glucose blood   TEST BLOOD SUGAR THREE TIMES DAILY      alendronate 70 MG tablet  Commonly known as: FOSAMAX   70 mg      B-D SINGLE USE SWABS REGULAR pads   Use 3 times a day with insulin injections.      B-D UF III MINI PEN NEEDLES 31G X 5 MM misc  Generic drug: Insulin Pen Needle   USE ONE PEN NEEDLE TO GIVE INSULIN SHOTS FOUR TIMES A DAY      Blood Glucose Monitoring Suppl w/Device kit   Check tid E11.9 wants  accu-check guide meter      Diclofenac Sodium 1 % gel gel  Commonly known as: VOLTAREN   As Needed.      DULoxetine 60 MG capsule  Commonly known as: CYMBALTA   60 mg, Oral, Daily      FreeStyle James 2 [place] device   1 Device, Does not apply, Every 14 Days      FreeStyle James 2 Sensor misc   1 Device, Does not apply, Every 14 Days       MG tablet  Generic drug: ibuprofen   TAKE 1 TABLET BY MOUTH EVERY 8 (EIGHT) HOURS AS NEEDED FOR MODERATE PAIN .      Lancets misc   Check three times daily      metoprolol succinate XL 25 MG 24 hr tablet  Commonly known as: TOPROL-XL   TAKE 1 TABLET EVERY DAY      NovoLOG FlexPen 100 UNIT/ML solution pen-injector sc pen  Generic drug: insulin aspart   Patient uses 20 units with each meal. 20 units 4 times daily.      pregabalin 150 MG capsule  Commonly known as: LYRICA   TAKE 1 CAPSULE TWICE DAILY      Tirzepatide 5 MG/0.5ML solution pen-injector pen  Commonly known as: Mounjaro   5 mg, Subcutaneous, Weekly             Stop These Medications      empagliflozin 10 MG tablet tablet  Commonly known as: Jardiance     Jardiance 10 MG tablet tablet  Generic drug: empagliflozin     meloxicam 15 MG tablet  Commonly known as: MOBIC            Discharge Diet:     Diet Instructions       Diet: Cardiac Diets, Diabetic Diets; Healthy Heart (2-3 Na+); Regular (IDDSI 7); Thin (IDDSI 0); Consistent Carbohydrate      Discharge Diet:  Cardiac Diets  Diabetic Diets       Cardiac Diet: Healthy Heart (2-3 Na+)    Texture: Regular (IDDSI 7)    Fluid Consistency: Thin (IDDSI 0)    Diabetic Diet: Consistent Carbohydrate          Activity at Discharge:     Activity Instructions       Other Activity Instructions      Activity Instructions: per general surgeon          Follow-up Appointments:     Follow-up Information       Berta Clifton, DO Follow up in 10 day(s).    Specialty: General Surgery  Contact information:  [address]  [facility] [address] [phone]                Carolina Soto, APRN Follow up in 3 day(s).    Specialty: Nurse Practitioner  Contact information:  [address]  [facility] [address] [phone]                           No future appointments.  Test Results Pending at Discharge:    Pending Labs       Order Current Status    TISSUE EXAM, [facility] ([facility]) In process               LARA Baum  03/26/24  12:54 EDT    Time: I spent 40 minutes on this discharge activity which included: face-to-face encounter with the patient, reviewing the data in the system, coordination of the care with the nursing staff as well as consultants, documentation, and entering orders.     Dictated utilizing Dragon dictation.      Electronically signed by Pino Sprague DO at 03/26/24 8037

## 2024-04-02 ENCOUNTER — LAB (OUTPATIENT)
Dept: LAB | Facility: HOSPITAL | Age: 69
End: 2024-04-02
Payer: MEDICARE

## 2024-04-02 ENCOUNTER — OFFICE VISIT (OUTPATIENT)
Dept: INTERNAL MEDICINE | Facility: CLINIC | Age: 69
End: 2024-04-02
Payer: MEDICARE

## 2024-04-02 VITALS
HEART RATE: 76 BPM | SYSTOLIC BLOOD PRESSURE: 127 MMHG | TEMPERATURE: 97.9 F | OXYGEN SATURATION: 98 % | WEIGHT: 176 LBS | BODY MASS INDEX: 32.39 KG/M2 | DIASTOLIC BLOOD PRESSURE: 63 MMHG | HEIGHT: 62 IN | RESPIRATION RATE: 15 BRPM

## 2024-04-02 DIAGNOSIS — N18.2 TYPE 2 DIABETES MELLITUS WITH STAGE 2 CHRONIC KIDNEY DISEASE, WITH LONG-TERM CURRENT USE OF INSULIN: ICD-10-CM

## 2024-04-02 DIAGNOSIS — I10 BENIGN ESSENTIAL HYPERTENSION: ICD-10-CM

## 2024-04-02 DIAGNOSIS — K82.A1 CHOLECYSTITIS WITH GANGRENE OF GALLBLADDER: ICD-10-CM

## 2024-04-02 DIAGNOSIS — Z09 HOSPITAL DISCHARGE FOLLOW-UP: Primary | ICD-10-CM

## 2024-04-02 DIAGNOSIS — E11.40 DIABETIC NEUROPATHY WITH NEUROLOGIC COMPLICATION: Primary | ICD-10-CM

## 2024-04-02 DIAGNOSIS — Z79.4 TYPE 2 DIABETES MELLITUS WITH STAGE 2 CHRONIC KIDNEY DISEASE, WITH LONG-TERM CURRENT USE OF INSULIN: ICD-10-CM

## 2024-04-02 DIAGNOSIS — E11.22 TYPE 2 DIABETES MELLITUS WITH STAGE 2 CHRONIC KIDNEY DISEASE, WITH LONG-TERM CURRENT USE OF INSULIN: ICD-10-CM

## 2024-04-02 DIAGNOSIS — D72.829 LEUKOCYTOSIS, UNSPECIFIED TYPE: ICD-10-CM

## 2024-04-02 DIAGNOSIS — N39.0 URINARY TRACT INFECTION WITHOUT HEMATURIA, SITE UNSPECIFIED: ICD-10-CM

## 2024-04-02 DIAGNOSIS — E11.49 DIABETIC NEUROPATHY WITH NEUROLOGIC COMPLICATION: Primary | ICD-10-CM

## 2024-04-02 LAB
ALBUMIN SERPL-MCNC: 3.9 G/DL (ref 3.5–5.2)
ALBUMIN/GLOB SERPL: 1.7 G/DL
ALP SERPL-CCNC: 41 U/L (ref 39–117)
ALT SERPL W P-5'-P-CCNC: 21 U/L (ref 1–33)
ANION GAP SERPL CALCULATED.3IONS-SCNC: 13.3 MMOL/L (ref 5–15)
AST SERPL-CCNC: 24 U/L (ref 1–32)
BASOPHILS # BLD AUTO: 0.08 10*3/MM3 (ref 0–0.2)
BASOPHILS NFR BLD AUTO: 0.8 % (ref 0–1.5)
BILIRUB SERPL-MCNC: 0.5 MG/DL (ref 0–1.2)
BUN SERPL-MCNC: 12 MG/DL (ref 8–23)
BUN/CREAT SERPL: 14.8 (ref 7–25)
CALCIUM SPEC-SCNC: 9.4 MG/DL (ref 8.6–10.5)
CHLORIDE SERPL-SCNC: 106 MMOL/L (ref 98–107)
CO2 SERPL-SCNC: 22.7 MMOL/L (ref 22–29)
CREAT SERPL-MCNC: 0.81 MG/DL (ref 0.57–1)
DEPRECATED RDW RBC AUTO: 44.6 FL (ref 37–54)
EGFRCR SERPLBLD CKD-EPI 2021: 79.7 ML/MIN/1.73
EOSINOPHIL # BLD AUTO: 0.21 10*3/MM3 (ref 0–0.4)
EOSINOPHIL NFR BLD AUTO: 2.1 % (ref 0.3–6.2)
ERYTHROCYTE [DISTWIDTH] IN BLOOD BY AUTOMATED COUNT: 13.3 % (ref 12.3–15.4)
GLOBULIN UR ELPH-MCNC: 2.3 GM/DL
GLUCOSE SERPL-MCNC: 130 MG/DL (ref 65–99)
HBA1C MFR BLD: 6.3 % (ref 4.8–5.6)
HCT VFR BLD AUTO: 52.3 % (ref 34–46.6)
HGB BLD-MCNC: 17.8 G/DL (ref 12–15.9)
LYMPHOCYTES # BLD AUTO: 2.11 10*3/MM3 (ref 0.7–3.1)
LYMPHOCYTES NFR BLD AUTO: 20.7 % (ref 19.6–45.3)
MCH RBC QN AUTO: 31.1 PG (ref 26.6–33)
MCHC RBC AUTO-ENTMCNC: 34 G/DL (ref 31.5–35.7)
MCV RBC AUTO: 91.3 FL (ref 79–97)
MONOCYTES # BLD AUTO: 0.96 10*3/MM3 (ref 0.1–0.9)
MONOCYTES NFR BLD AUTO: 9.4 % (ref 5–12)
NEUTROPHILS NFR BLD AUTO: 6.68 10*3/MM3 (ref 1.7–7)
NEUTROPHILS NFR BLD AUTO: 65.7 % (ref 42.7–76)
PLATELET # BLD AUTO: 274 10*3/MM3 (ref 140–450)
PMV BLD AUTO: 10.8 FL (ref 6–12)
POTASSIUM SERPL-SCNC: 3.7 MMOL/L (ref 3.5–5.2)
PROT SERPL-MCNC: 6.2 G/DL (ref 6–8.5)
RBC # BLD AUTO: 5.73 10*6/MM3 (ref 3.77–5.28)
SODIUM SERPL-SCNC: 142 MMOL/L (ref 136–145)
WBC NRBC COR # BLD AUTO: 10.17 10*3/MM3 (ref 3.4–10.8)

## 2024-04-02 PROCEDURE — 85007 BL SMEAR W/DIFF WBC COUNT: CPT | Performed by: PHYSICIAN ASSISTANT

## 2024-04-02 PROCEDURE — 80053 COMPREHEN METABOLIC PANEL: CPT

## 2024-04-02 PROCEDURE — 85025 COMPLETE CBC W/AUTO DIFF WBC: CPT | Performed by: PHYSICIAN ASSISTANT

## 2024-04-02 PROCEDURE — 83036 HEMOGLOBIN GLYCOSYLATED A1C: CPT

## 2024-04-02 PROCEDURE — 36415 COLL VENOUS BLD VENIPUNCTURE: CPT

## 2024-04-02 RX ORDER — PRAZOSIN HYDROCHLORIDE 1 MG/1
1 CAPSULE ORAL
COMMUNITY

## 2024-04-02 NOTE — PROGRESS NOTES
Transitional Care Follow Up Visit  Subjective     Mercy Clemens is a 68 y.o. female who presents for a transitional care management visit.    Within 48 business hours after discharge our office contacted her via telephone to coordinate her care and needs.      I reviewed and discussed the details of that call along with the discharge summary, hospital problems, inpatient lab results, inpatient diagnostic studies, and consultation reports with Mercy.     Current outpatient and discharge medications have been reconciled for the patient.  Reviewed by: Betzy Morfin PA-C          3/26/2024     5:19 PM   Date of TCM Phone Call   [facility]   Date of Admission 3/25/2024   Date of Discharge 3/26/2024   Discharge Disposition Home or Self Care     Risk for Readmission (LACE) Score: 3 (3/26/2024  6:00 AM)      History of Present Illness   Course During Hospital Stay: Presented to [facility] ED on 3/25/2024 with complaint of abdominal pain at which time she was found to have leukocytosis, UTI and distended gallbladder with stones and sludge on ultrasound suggestive of acute cholecystitis.  She underwent laparoscopic cholecystectomy with Dr. Clifton and was subsequently admitted for observation given complication of gangrenous gallbladder with bile spillage.  Prior to discharge, leukocytosis was noted and it is recommended that it be rechecked today.  She was treated with Rocephin and Flagyl during admission and discharged with Levaquin and Flagyl which she has been taking as directed and should finish tomorrow morning. Feeling good today for the first time since hospitalization. Denies any fever since discharge. Has noticed stools looking a little darker than normal and a little loose but not today. Incision sites are healing well without any discharge.  Drinking plenty of water and tolerating p.o. diet.  Losartan-HCTZ was held during hospital admission due to hypotension and she was  instructed to continue holding upon discharge.  She did resume losartan-HCTZ this morning due to noticing minimal swelling in bilateral lower legs yesterday.    Glucose running around 169. Has not taken Mounjaro since around 2 weeks ago due to hospital admission, but it typically helps control glucose well. She will resume Mounjaro this week.            The following portions of the patient's history were reviewed and updated as appropriate: allergies, current medications, past family history, past medical history, past social history, past surgical history, and problem list.        Objective   Physical Exam  Vitals and nursing note reviewed.   Constitutional:       General: She is not in acute distress.     Appearance: She is well-developed. She is obese. She is not ill-appearing, toxic-appearing or diaphoretic.   HENT:      Head: Normocephalic and atraumatic.      Right Ear: External ear normal.      Left Ear: External ear normal.   Eyes:      General: No scleral icterus.     Extraocular Movements: Extraocular movements intact.      Conjunctiva/sclera: Conjunctivae normal.      Pupils: Pupils are equal, round, and reactive to light.   Cardiovascular:      Rate and Rhythm: Normal rate and regular rhythm.      Heart sounds: Normal heart sounds. No murmur heard.     No friction rub. No gallop.   Pulmonary:      Effort: Pulmonary effort is normal. No respiratory distress.      Breath sounds: Normal breath sounds. No wheezing, rhonchi or rales.   Chest:      Chest wall: No tenderness.   Abdominal:      General: Bowel sounds are normal.      Palpations: Abdomen is soft.      Tenderness: There is no abdominal tenderness. There is no right CVA tenderness, left CVA tenderness or guarding.      Comments: Laparoscopic incision sites healing well without any abnormal erythema or discharge.   Musculoskeletal:         General: No tenderness or deformity. Normal range of motion.      Cervical back: Normal range of motion and  neck supple. No tenderness.      Right lower leg: No edema (No appreciable edema).      Left lower leg: No edema (No appreciable edema).   Lymphadenopathy:      Cervical: No cervical adenopathy.   Skin:     General: Skin is warm and dry.      Capillary Refill: Capillary refill takes less than 2 seconds.      Findings: No rash.   Neurological:      Mental Status: She is alert and oriented to person, place, and time.      Cranial Nerves: No cranial nerve deficit.      Sensory: No sensory deficit.      Motor: No abnormal muscle tone.      Coordination: Coordination normal.      Deep Tendon Reflexes: Reflexes normal.   Psychiatric:         Mood and Affect: Mood normal.         Behavior: Behavior normal.         Thought Content: Thought content normal.         Judgment: Judgment normal.         Assessment & Plan   Diagnoses and all orders for this visit:    1. Hospital discharge follow-up (Primary)    2. Cholecystitis with gangrene of gallbladder    3. Leukocytosis, unspecified type  -     CBC & Differential  -     Comprehensive metabolic panel    4. Urinary tract infection without hematuria, site unspecified    5. Type 2 diabetes mellitus with stage 2 chronic kidney disease, with long-term current use of insulin  Assessment & Plan:  Checking A1c today due to last lab being more than 6 months ago.  Resume Mounjaro weekly dosing later this week.  Continue NovoLog as prescribed.    Orders:  -     Hemoglobin A1c    6. Benign essential hypertension  Assessment & Plan:  Resumed losartan-HCTZ this morning, continue daily.        Finish Levaquin and Flagyl as prescribed.  Reassess CBC, CMP today to ensure lab abnormalities are improving.      Hospital admission records reviewed at length.    Transitional Care Management Certification  I certify that the following are true:  Communication was made within 2 business days of discharge.  Complexity of Medical Decision Making is moderate.  Face to face visit occurred within 7  days.

## 2024-04-02 NOTE — ASSESSMENT & PLAN NOTE
Checking A1c today due to last lab being more than 6 months ago.  Resume Mounjaro weekly dosing later this week.  Continue NovoLog as prescribed.

## 2024-04-03 NOTE — PROGRESS NOTES
"Patient: Mercy Clemens    YOB: 1955    Date: 04/05/2024    Primary Care Provider: Carolina Soto APRN    Chief Complaint   Patient presents with    Post-op     Lap micky       History of present illness:  I saw the patient in the office today as a followup from their recent robotic assisted laparoscopic cholecystectomy with ICG 3/25/24. Pathology did show focal acute on chronic cholecystitis and cholelithiasis. They state that they have done well and are having no complaints. She is tolerating a regular diet and reports her stools are softer than previous, but denies diarrhea.    Vital Signs:   Vitals:    04/05/24 1006   BP: 118/70   Pulse: 83   Temp: 98.2 °F (36.8 °C)   TempSrc: Temporal   SpO2: 94%   Weight: 78.5 kg (173 lb)   Height: 157.5 cm (62\")       Physical Exam:   General Appearance:    Alert, cooperative, in no acute distress   Abdomen:     no masses, no organomegaly, soft non-tender, non-distended, no guarding, wounds are well healed     Assessment / Plan :    1. Status post laparoscopic cholecystectomy    2. Fatty liver        I did discuss the situation with the patient today in the office and they have done well from their recent robotic assisted laparoscopic cholecystectomy with ICG. I have released the patient back to normal activity, they understand that they need to be careful about heavy lifting. Patient did also have evidence of fatty liver disease intraoperatively and I recommend weight loss and dietary modifications for this. I need to see the patient back in the office only if they are having further problems, they know to call me if they are.    Electronically signed by Berta Clifton DO  04/05/24                "

## 2024-04-05 ENCOUNTER — OFFICE VISIT (OUTPATIENT)
Dept: SURGERY | Facility: CLINIC | Age: 69
End: 2024-04-05
Payer: MEDICARE

## 2024-04-05 VITALS
SYSTOLIC BLOOD PRESSURE: 118 MMHG | WEIGHT: 173 LBS | BODY MASS INDEX: 31.83 KG/M2 | TEMPERATURE: 98.2 F | HEART RATE: 83 BPM | OXYGEN SATURATION: 94 % | DIASTOLIC BLOOD PRESSURE: 70 MMHG | HEIGHT: 62 IN

## 2024-04-05 DIAGNOSIS — K76.0 FATTY LIVER: ICD-10-CM

## 2024-04-05 DIAGNOSIS — Z90.49 STATUS POST LAPAROSCOPIC CHOLECYSTECTOMY: Primary | ICD-10-CM

## 2024-04-05 PROBLEM — E66.9 OBESITY (BMI 30.0-34.9): Status: ACTIVE | Noted: 2024-04-05

## 2024-04-05 PROBLEM — E66.811 OBESITY (BMI 30.0-34.9): Status: ACTIVE | Noted: 2024-04-05

## 2024-04-05 PROCEDURE — 1159F MED LIST DOCD IN RCRD: CPT | Performed by: STUDENT IN AN ORGANIZED HEALTH CARE EDUCATION/TRAINING PROGRAM

## 2024-04-05 PROCEDURE — 3074F SYST BP LT 130 MM HG: CPT | Performed by: STUDENT IN AN ORGANIZED HEALTH CARE EDUCATION/TRAINING PROGRAM

## 2024-04-05 PROCEDURE — 99024 POSTOP FOLLOW-UP VISIT: CPT | Performed by: STUDENT IN AN ORGANIZED HEALTH CARE EDUCATION/TRAINING PROGRAM

## 2024-04-05 PROCEDURE — 3078F DIAST BP <80 MM HG: CPT | Performed by: STUDENT IN AN ORGANIZED HEALTH CARE EDUCATION/TRAINING PROGRAM

## 2024-04-05 PROCEDURE — 1160F RVW MEDS BY RX/DR IN RCRD: CPT | Performed by: STUDENT IN AN ORGANIZED HEALTH CARE EDUCATION/TRAINING PROGRAM

## 2024-04-09 RX ORDER — BLOOD SUGAR DIAGNOSTIC
STRIP MISCELLANEOUS SEE ADMIN INSTRUCTIONS
Qty: 300 EACH | Refills: 3 | Status: SHIPPED | OUTPATIENT
Start: 2024-04-09

## 2024-06-27 ENCOUNTER — OFFICE VISIT (OUTPATIENT)
Dept: INTERNAL MEDICINE | Facility: CLINIC | Age: 69
End: 2024-06-27
Payer: MEDICARE

## 2024-06-27 VITALS
DIASTOLIC BLOOD PRESSURE: 78 MMHG | OXYGEN SATURATION: 98 % | BODY MASS INDEX: 33.13 KG/M2 | HEART RATE: 78 BPM | SYSTOLIC BLOOD PRESSURE: 118 MMHG | TEMPERATURE: 98 F | WEIGHT: 180 LBS | HEIGHT: 62 IN

## 2024-06-27 DIAGNOSIS — R22.0 LOCALIZED SWELLING, MASS, AND LUMP OF HEAD: Primary | ICD-10-CM

## 2024-06-27 PROCEDURE — 1159F MED LIST DOCD IN RCRD: CPT | Performed by: FAMILY MEDICINE

## 2024-06-27 PROCEDURE — 1125F AMNT PAIN NOTED PAIN PRSNT: CPT | Performed by: FAMILY MEDICINE

## 2024-06-27 PROCEDURE — 3044F HG A1C LEVEL LT 7.0%: CPT | Performed by: FAMILY MEDICINE

## 2024-06-27 PROCEDURE — 1160F RVW MEDS BY RX/DR IN RCRD: CPT | Performed by: FAMILY MEDICINE

## 2024-06-27 PROCEDURE — 3074F SYST BP LT 130 MM HG: CPT | Performed by: FAMILY MEDICINE

## 2024-06-27 PROCEDURE — 99213 OFFICE O/P EST LOW 20 MIN: CPT | Performed by: FAMILY MEDICINE

## 2024-06-27 PROCEDURE — 3078F DIAST BP <80 MM HG: CPT | Performed by: FAMILY MEDICINE

## 2024-06-27 RX ORDER — DOXYCYCLINE HYCLATE 100 MG/1
100 CAPSULE ORAL 2 TIMES DAILY
Qty: 20 CAPSULE | Refills: 0 | Status: SHIPPED | OUTPATIENT
Start: 2024-06-27
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
## 2024-06-29 DIAGNOSIS — E11.65 UNCONTROLLED TYPE 2 DIABETES MELLITUS WITH HYPERGLYCEMIA, WITH LONG-TERM CURRENT USE OF INSULIN: ICD-10-CM

## 2024-06-29 DIAGNOSIS — Z79.4 UNCONTROLLED TYPE 2 DIABETES MELLITUS WITH HYPERGLYCEMIA, WITH LONG-TERM CURRENT USE OF INSULIN: ICD-10-CM

## 2024-06-29 RX ORDER — PEN NEEDLE, DIABETIC 31 GX3/16"
NEEDLE, DISPOSABLE MISCELLANEOUS SEE ADMIN INSTRUCTIONS
Qty: 300 EACH | Refills: 3 | Status: SHIPPED | OUTPATIENT
Start: 2024-06-29

## 2024-07-01 RX ORDER — EMPAGLIFLOZIN 10 MG/1
10 TABLET, FILM COATED ORAL DAILY
Qty: 90 TABLET | Refills: 3 | OUTPATIENT
Start: 2024-07-01

## 2024-07-03 DIAGNOSIS — M79.7 FIBROMYALGIA: ICD-10-CM

## 2024-07-03 DIAGNOSIS — E11.40 DIABETIC NEUROPATHY, PAINFUL: ICD-10-CM

## 2024-07-03 RX ORDER — PREGABALIN 150 MG/1
150 CAPSULE ORAL 2 TIMES DAILY
Qty: 180 CAPSULE | Refills: 0 | Status: SHIPPED | OUTPATIENT
Start: 2024-07-03

## 2024-07-03 NOTE — TELEPHONE ENCOUNTER
Rx Refill Note  Requested Prescriptions     Pending Prescriptions Disp Refills    pregabalin (LYRICA) 150 MG capsule 180 capsule 0     Sig: Take 1 capsule by mouth 2 (Two) Times a Day.     Signed Prescriptions Disp Refills    Continuous Glucose  (FreeStyle James 2 [place]) device 2 each 6     Sig: Use 1 Device Every 14 (Fourteen) Days.     Authorizing Provider: RIP AMBROSIO     Ordering User: KINSEY LAN      Last office visit with prescribing clinician: 9/18/2023   Last telemedicine visit with prescribing clinician: Visit date not found   Next office visit with prescribing clinician: 10/2/2024                         Would you like a call back once the refill request has been completed: [] Yes [] No    If the office needs to give you a call back, can they leave a voicemail: [] Yes [] No    Kinsey Lan MA  07/03/24, 16:22 EDT

## 2024-08-01 ENCOUNTER — CLINICAL SUPPORT (OUTPATIENT)
Dept: INTERNAL MEDICINE | Facility: CLINIC | Age: 69
End: 2024-08-01
Payer: MEDICARE

## 2024-09-12 DIAGNOSIS — I10 BENIGN ESSENTIAL HYPERTENSION: ICD-10-CM

## 2024-09-12 RX ORDER — METOPROLOL SUCCINATE 25 MG/1
TABLET, EXTENDED RELEASE ORAL
Qty: 90 TABLET | Refills: 3 | Status: SHIPPED | OUTPATIENT
Start: 2024-09-12

## 2024-09-17 DIAGNOSIS — E11.65 UNCONTROLLED TYPE 2 DIABETES MELLITUS WITH HYPERGLYCEMIA, WITH LONG-TERM CURRENT USE OF INSULIN: Primary | ICD-10-CM

## 2024-09-17 DIAGNOSIS — Z79.4 UNCONTROLLED TYPE 2 DIABETES MELLITUS WITH HYPERGLYCEMIA, WITH LONG-TERM CURRENT USE OF INSULIN: Primary | ICD-10-CM

## 2024-09-25 ENCOUNTER — CLINICAL SUPPORT (OUTPATIENT)
Dept: INTERNAL MEDICINE | Facility: CLINIC | Age: 69
End: 2024-09-25
Payer: MEDICARE

## 2024-09-25 DIAGNOSIS — E11.65 UNCONTROLLED TYPE 2 DIABETES MELLITUS WITH HYPERGLYCEMIA, WITH LONG-TERM CURRENT USE OF INSULIN: Primary | ICD-10-CM

## 2024-09-25 DIAGNOSIS — Z79.4 UNCONTROLLED TYPE 2 DIABETES MELLITUS WITH HYPERGLYCEMIA, WITH LONG-TERM CURRENT USE OF INSULIN: Primary | ICD-10-CM

## 2024-10-02 ENCOUNTER — OFFICE VISIT (OUTPATIENT)
Dept: INTERNAL MEDICINE | Facility: CLINIC | Age: 69
End: 2024-10-02
Payer: MEDICARE

## 2024-10-02 VITALS
WEIGHT: 184 LBS | HEART RATE: 71 BPM | OXYGEN SATURATION: 99 % | BODY MASS INDEX: 33.86 KG/M2 | HEIGHT: 62 IN | DIASTOLIC BLOOD PRESSURE: 60 MMHG | RESPIRATION RATE: 18 BRPM | SYSTOLIC BLOOD PRESSURE: 118 MMHG | TEMPERATURE: 97.8 F

## 2024-10-02 DIAGNOSIS — I10 BENIGN ESSENTIAL HYPERTENSION: ICD-10-CM

## 2024-10-02 DIAGNOSIS — Z82.49 FAMILY HISTORY OF CARDIOVASCULAR DISEASE: ICD-10-CM

## 2024-10-02 DIAGNOSIS — E11.65 UNCONTROLLED TYPE 2 DIABETES MELLITUS WITH HYPERGLYCEMIA, WITH LONG-TERM CURRENT USE OF INSULIN: ICD-10-CM

## 2024-10-02 DIAGNOSIS — E55.9 VITAMIN D DEFICIENCY: ICD-10-CM

## 2024-10-02 DIAGNOSIS — Z79.4 UNCONTROLLED TYPE 2 DIABETES MELLITUS WITH HYPERGLYCEMIA, WITH LONG-TERM CURRENT USE OF INSULIN: ICD-10-CM

## 2024-10-02 DIAGNOSIS — Z00.00 MEDICARE ANNUAL WELLNESS VISIT, SUBSEQUENT: Primary | ICD-10-CM

## 2024-10-02 DIAGNOSIS — Z12.31 ENCOUNTER FOR SCREENING MAMMOGRAM FOR MALIGNANT NEOPLASM OF BREAST: ICD-10-CM

## 2024-10-02 DIAGNOSIS — E78.2 MIXED HYPERLIPIDEMIA: ICD-10-CM

## 2024-10-02 DIAGNOSIS — R53.83 FATIGUE, UNSPECIFIED TYPE: ICD-10-CM

## 2024-10-02 RX ORDER — CYCLOBENZAPRINE HCL 5 MG
10 TABLET ORAL 3 TIMES DAILY PRN
Qty: 90 TABLET | Refills: 3 | Status: SHIPPED | OUTPATIENT
Start: 2024-10-02

## 2024-10-02 NOTE — PROGRESS NOTES
Subjective   The ABCs of the Annual Wellness Visit  Medicare Wellness Visit      Mercy Clemens is a 69 y.o. patient who presents for a Medicare Wellness Visit.    The following portions of the patient's history were reviewed and   updated as appropriate: allergies, current medications, past family history, past medical history, past social history, past surgical history, and problem list.    Compared to one year ago, the patient's physical   health is the same.  Compared to one year ago, the patient's mental   health is the same.    Recent Hospitalizations:  This patient has had a [facility] admission record on file within the last 365 days.  Current Medical Providers:  Patient Care Team:  Carolina Soto APRN as PCP - General (Family Medicine)    Outpatient Medications Prior to Visit   Medication Sig Dispense Refill    Alcohol Swabs (B-D SINGLE USE SWABS REGULAR) pads Use 3 times a day with insulin injections. 300 each 3    alendronate (FOSAMAX) 70 MG tablet 1 tablet.      Blood Glucose Monitoring Suppl w/Device kit Check tid E11.9 wants accu-check guide meter 1 each 0    Continuous Glucose  (FreeStyle James 2 [place]) device Use 1 Device Every 14 (Fourteen) Days. 2 each 6    Continuous Glucose Sensor (FreeStyle James 2 Sensor) misc Use 1 Device Every 14 (Fourteen) Days. 2 each 4    Diclofenac Sodium (VOLTAREN) 1 % gel gel As Needed.      Droplet Pen Needles 31G X 5 MM misc USE AS DIRECTED WITH INSULIN 300 each 3    DULoxetine (CYMBALTA) 60 MG capsule Take 1 capsule by mouth Daily.      glucose blood (Accu-Chek Guide) test strip USE AS INSTRUCTED 300 each 3     MG tablet TAKE 1 TABLET BY MOUTH EVERY 8 (EIGHT) HOURS AS NEEDED FOR MODERATE PAIN . 30 tablet 5    insulin aspart (NovoLOG FlexPen) 100 UNIT/ML solution pen-injector sc pen Patient uses 20 units with each meal. 20 units 4 times daily. (Patient taking differently: Patient uses 20 units with each meal. 20 units 3 times daily.) 30 mL  5    Lancets misc Check three times daily 200 each 2    losartan-hydrochlorothiazide (HYZAAR) 100-25 MG per tablet Take 1 tablet by mouth Daily. Hold unless BP > 140      meloxicam (MOBIC) 15 MG tablet       metoprolol succinate XL (TOPROL-XL) 25 MG 24 hr tablet TAKE 1 TABLET EVERY DAY 90 tablet 3    prazosin (MINIPRESS) 1 MG capsule Take 1 capsule by mouth.      pregabalin (LYRICA) 150 MG capsule Take 1 capsule by mouth 2 (Two) Times a Day. 180 capsule 0    sulfamethoxazole-trimethoprim (BACTRIM DS,SEPTRA DS) 800-160 MG per tablet 1 po bid 14 tablet 0    Insulin Disposable Pump (Omnipod DASH Pods, Gen 4,) misc        No facility-administered medications prior to visit.     No opioid medication identified on active medication list. I have reviewed chart for other potential  high risk medication/s and harmful drug interactions in the elderly.      Aspirin is not on active medication list.  Aspirin use is not indicated based on review of current medical condition/s. Risk of harm outweighs potential benefits.  .    Patient Active Problem List   Diagnosis    Hyperlipidemia    Diverticulosis of large intestine    Benign essential hypertension    Eosinophil count raised    Obstructive sleep apnea syndrome    Vitamin D deficiency    Fibromyalgia    Premature surgical menopause    Diabetic neuropathy    Primary insomnia    Degenerative joint disease involving multiple joints    Status post cholecystectomy    Chronic kidney disease, stage 2 (mild)    Type 2 diabetes mellitus with stage 2 chronic kidney disease, with long-term current use of insulin    Obesity (BMI 30.0-34.9)    Localized swelling, mass, and lump of head     Advance Care Planning Advance Directive is not on file.  ACP discussion was declined by the patient. Patient does not have an advance directive, declines further assistance.            Objective   Vitals:    10/02/24 1026   BP: 118/60   Pulse: 71   Resp: 18   Temp: 97.8 °F (36.6 °C)   SpO2: 99%   Weight:  "83.5 kg (184 lb)   Height: 157.5 cm (62\")   PainSc: 0-No pain       Estimated body mass index is 33.65 kg/m² as calculated from the following:    Height as of this encounter: 157.5 cm (62\").    Weight as of this encounter: 83.5 kg (184 lb).            Does the patient have evidence of cognitive impairment? No  Lab Results   Component Value Date    TRIG 163 (H) 10/12/2024    HDL 42 10/12/2024     (H) 10/12/2024    VLDL 29 10/12/2024    HGBA1C 7.20 (H) 10/12/2024                                                                                                Health  Risk Assessment    Smoking Status:  Social History     Tobacco Use   Smoking Status Never    Passive exposure: Past   Smokeless Tobacco Never     Alcohol Consumption:  Social History     Substance and Sexual Activity   Alcohol Use No       Fall Risk Screen  STEADI Fall Risk Assessment was completed, and patient is at LOW risk for falls.Assessment completed on:10/2/2024    Depression Screening:      10/2/2024    10:24 AM   PHQ-2/PHQ-9 Depression Screening   Retired Little Interest or Pleasure in Doing Things 0-->not at all   Retired Feeling Down, Depressed or Hopeless 0-->not at all   Retired PHQ-9: Brief Depression Severity Measure Score 0     Health Habits and Functional and Cognitive Screening:      10/2/2024    10:24 AM   Functional & Cognitive Status   Do you have difficulty preparing food and eating? No   Do you have difficulty bathing yourself, getting dressed or grooming yourself? No   Do you have difficulty using the toilet? No   Do you have difficulty moving around from place to place? No   Do you have trouble with steps or getting out of a bed or a chair? No   Current Diet Diabetic Diet   Dental Exam Up to date   Eye Exam Up to date   Exercise (times per week) 0 times per week   Current Exercises Include No Regular Exercise   Do you need help using the phone?  No   Are you deaf or do you have serious difficulty hearing?  No   Do you need " help to go to places out of walking distance? No   Do you need help shopping? No   Do you need help preparing meals?  No   Do you need help with housework?  No   Do you need help with laundry? No   Do you need help taking your medications? No   Do you need help managing money? No   Do you ever drive or ride in a car without wearing a seat belt? No   Have you felt unusual stress, anger or loneliness in the last month? No   Who do you live with? Spouse   If you need help, do you have trouble finding someone available to you? No   Have you been bothered in the last four weeks by sexual problems? No   Do you have difficulty concentrating, remembering or making decisions? No           Age-appropriate Screening Schedule:  Refer to the list below for future screening recommendations based on patient's age, sex and/or medical conditions. Orders for these recommended tests are listed in the plan section. The patient has been provided with a written plan.    Health Maintenance List  Health Maintenance   Topic Date Due    ZOSTER VACCINE (1 of 2) Never done    DIABETIC FOOT EXAM  06/05/2020    MAMMOGRAM  02/28/2024    DIABETIC EYE EXAM  10/23/2024    COVID-19 Vaccine (3 - 2023-24 season) 12/22/2024 (Originally 9/1/2024)    INFLUENZA VACCINE  03/31/2025 (Originally 8/1/2024)    URINE MICROALBUMIN  12/22/2024    BMI FOLLOWUP  04/05/2025    HEMOGLOBIN A1C  04/12/2025    DXA SCAN  05/11/2025    ANNUAL WELLNESS VISIT  10/02/2025    LIPID PANEL  10/12/2025    COLORECTAL CANCER SCREENING  01/01/2027    TDAP/TD VACCINES (2 - Td or Tdap) 09/21/2030    Pneumococcal Vaccine 65+  Completed    HEPATITIS C SCREENING  Discontinued    PAP SMEAR  Discontinued                                                                                                                                                CMS Preventative Services Quick Reference  Risk Factors Identified During Encounter  Discussed health maintenance components and recommendations  with patient    The above risks/problems have been discussed with the patient.  Pertinent information has been shared with the patient in the After Visit Summary.  An After Visit Summary and PPPS were made available to the patient.    Follow Up:   Next Medicare Wellness visit to be scheduled in 1 year.         Additional E&M Note during same encounter follows:  Patient has additional, significant, and separately identifiable condition(s)/problem(s) that require work above and beyond the Medicare Wellness Visit     Chief Complaint  Medicare Wellness-subsequent    Subjective    HPI  Mercy is also being seen today for an annual adult preventative physical exam.        The patient is a 69-year-old female who presents for a Medicare wellness follow-up. Vital signs are stable.    She recently visited urgent care due to a urinary tract infection (UTI). Although her culture did not show any growth, she reports no current symptoms and has not completed her prescribed antibiotics. She has not experienced any side effects from the medication.    She stopped taking Jardiance after her surgery and is currently only on insulin. Her blood sugar levels have been well-controlled. She has regular bowel movements and does not experience constipation.    She reports that her physical health is similar to last year and her mental health is good. She does not experience chest pain or shortness of breath. She does not experience shoulder pain. She occasionally experiences slight swelling in her feet, which subsides at night.    She requests a refill of cyclobenzaprine, which she finds helpful. She is not currently taking aspirin daily, although she used to. She underwent a stress test in the past but has never had a CT calcium score.    She is due for a mammogram and regularly checks her breasts during showers. Her vision, dental, and dermatology check-ups are all up to date.    She does not have a living will, but her  is aware  "of her wishes.    FAMILY HISTORY  She has a family history of hypertension, hyperlipidemia, and diabetes.          Objective   Vital Signs:  /60   Pulse 71   Temp 97.8 °F (36.6 °C)   Resp 18   Ht 157.5 cm (62\")   Wt 83.5 kg (184 lb)   SpO2 99%   BMI 33.65 kg/m²   Physical Exam  Vitals and nursing note reviewed.   Constitutional:       General: She is not in acute distress.     Appearance: Normal appearance. She is well-developed. She is obese.   HENT:      Head: Normocephalic and atraumatic.      Right Ear: Hearing, tympanic membrane, ear canal and external ear normal.      Left Ear: Hearing, tympanic membrane, ear canal and external ear normal.      Nose: Nose normal. No rhinorrhea.      Right Sinus: No maxillary sinus tenderness or frontal sinus tenderness.      Left Sinus: No maxillary sinus tenderness or frontal sinus tenderness.      Mouth/Throat:      Mouth: Mucous membranes are dry.      Dentition: Normal dentition.      Pharynx: Posterior oropharyngeal erythema present.      Comments: PND    Eyes:      Conjunctiva/sclera: Conjunctivae normal.      Pupils: Pupils are equal, round, and reactive to light.   Neck:      Thyroid: No thyroid mass or thyromegaly.      Vascular: No carotid bruit or JVD.   Cardiovascular:      Rate and Rhythm: Normal rate and regular rhythm.      Pulses: Normal pulses.      Heart sounds: Normal heart sounds, S1 normal and S2 normal. No murmur heard.  Pulmonary:      Effort: Pulmonary effort is normal. No respiratory distress.      Breath sounds: Normal breath sounds.   Abdominal:      General: Bowel sounds are normal. There is no distension or abdominal bruit.      Palpations: Abdomen is soft. There is no mass.      Tenderness: There is no abdominal tenderness. There is no right CVA tenderness, left CVA tenderness, guarding or rebound.      Hernia: No hernia is present.   Musculoskeletal:         General: Normal range of motion.      Cervical back: Normal range of motion " and neck supple.   Lymphadenopathy:      Head:      Right side of head: No submental, submandibular or tonsillar adenopathy.      Left side of head: No submental, submandibular or tonsillar adenopathy.      Cervical: No cervical adenopathy.   Skin:     General: Skin is warm and dry.      Capillary Refill: Capillary refill takes less than 2 seconds.      Findings: No rash.      Nails: There is no clubbing.   Neurological:      Mental Status: She is alert and oriented to person, place, and time.      Cranial Nerves: No cranial nerve deficit.      Sensory: No sensory deficit.      Gait: Gait normal.   Psychiatric:         Behavior: Behavior normal.         Thought Content: Thought content normal.         Judgment: Judgment normal.           Vital Signs  Blood pressure measures 118/60.             Assessment and Plan           1. Urinary Tract Infection (UTI).  The patient visited urgent care for a UTI, but the culture did not grow any organisms, suggesting contamination. She reports no current symptoms. She is advised to complete her antibiotic course and to wait a week after finishing the antibiotics before doing blood work. If symptoms persist, she should notify the office, and a urine sample will be recollected.    2. Muscle Spasms.  Cyclobenzaprine 10 mg was prescribed with several refills, to be sent to [facility]. She reports that the medication helps her relax and has no side effects. If the medication becomes ineffective, alternative treatments will be considered.    3. Diabetes Mellitus.  The patient is no longer taking Jardiance after her surgery. Jardiance 10 mg will be prescribed and sent to [facility]. She is advised to monitor her blood pressure due to the diuretic properties of Jardiance. A CT calcium score was recommended to assess coronary artery health due to her diabetes and family history of heart disease.    4. Health Maintenance.  A mammogram was ordered as it is overdue. The shingles vaccine was  recommended. A diabetic foot exam was advised. Blood work, including A1c, cholesterol, and thyroid, was ordered to be done one week after the completion of her antibiotic course.      Orders Placed This Encounter   Procedures    CT Cardiac Calcium Score Without Dye     Order Specific Question:   Release to patient     Answer:   Routine Release [[id number]]     Order Specific Question:   Reason for Exam:     Answer:   screening HTN HLD and DM family history of cardiovascular     Order Specific Question:   Does this patient have a diabetic monitoring/medication delivering device on?     Answer:   No    Mammo Screening Digital Tomosynthesis Bilateral With CAD     Order Specific Question:   Reason for Exam:     Answer:   screening     Order Specific Question:   Does this patient have a diabetic monitoring/medication delivering device on?     Answer:   No     Order Specific Question:   Release to patient     Answer:   Routine Release [1400000002]    Comprehensive Metabolic Panel     Order Specific Question:   Release to patient     Answer:   Routine Release [1400000002]    Hemoglobin A1c     Order Specific Question:   Release to patient     Answer:   Routine Release [1400000002]    Lipid Panel     Order Specific Question:   Release to patient     Answer:   Routine Release [1400000002]    Vitamin D,25-Hydroxy     Order Specific Question:   Release to patient     Answer:   Routine Release [1400000002]    Vitamin B12     Order Specific Question:   Release to patient     Answer:   Routine Release [[id number]]    TSH     Order Specific Question:   Release to patient     Answer:   Routine Release [[id number]]    CBC Auto Differential     Order Specific Question:   Release to patient     Answer:   Routine Release [1400000002]    T4, Free     Order Specific Question:   Release to patient     Answer:   Routine Release [[id number]]     New Medications Ordered This Visit   Medications    cyclobenzaprine (FLEXERIL) 5 MG tablet      Sig: Take 2 tablets by mouth 3 (Three) Times a Day As Needed for Muscle Spasms.     Dispense:  90 tablet     Refill:  3    empagliflozin (Jardiance) 10 MG tablet tablet     Sig: Take 1 tablet by mouth Daily.     Dispense:  90 tablet     Refill:  3          Follow Up   No follow-ups on file.  Patient was given instructions and counseling regarding her condition or for health maintenance advice. Please see specific information pulled into the AVS if appropriate.  Patient or patient representative verbalized consent for the use of Ambient Listening during the visit with  LARA Brito for chart documentation.

## 2024-10-12 ENCOUNTER — LAB (OUTPATIENT)
Dept: LAB | Facility: HOSPITAL | Age: 69
End: 2024-10-12
Payer: MEDICARE

## 2024-10-12 LAB
25(OH)D3 SERPL-MCNC: 32.5 NG/ML (ref 30–100)
ALBUMIN SERPL-MCNC: 4.4 G/DL (ref 3.5–5.2)
ALBUMIN/GLOB SERPL: 2.1 G/DL
ALP SERPL-CCNC: 52 U/L (ref 39–117)
ALT SERPL W P-5'-P-CCNC: 29 U/L (ref 1–33)
ANION GAP SERPL CALCULATED.3IONS-SCNC: 11.5 MMOL/L (ref 5–15)
AST SERPL-CCNC: 25 U/L (ref 1–32)
BASOPHILS # BLD AUTO: 0.05 10*3/MM3 (ref 0–0.2)
BASOPHILS NFR BLD AUTO: 0.8 % (ref 0–1.5)
BILIRUB SERPL-MCNC: 0.7 MG/DL (ref 0–1.2)
BUN SERPL-MCNC: 15 MG/DL (ref 8–23)
BUN/CREAT SERPL: 17 (ref 7–25)
CALCIUM SPEC-SCNC: 10.1 MG/DL (ref 8.6–10.5)
CHLORIDE SERPL-SCNC: 102 MMOL/L (ref 98–107)
CHOLEST SERPL-MCNC: 197 MG/DL (ref 0–200)
CO2 SERPL-SCNC: 27.5 MMOL/L (ref 22–29)
CREAT SERPL-MCNC: 0.88 MG/DL (ref 0.57–1)
DEPRECATED RDW RBC AUTO: 41.3 FL (ref 37–54)
EGFRCR SERPLBLD CKD-EPI 2021: 71.7 ML/MIN/1.73
EOSINOPHIL # BLD AUTO: 0.23 10*3/MM3 (ref 0–0.4)
EOSINOPHIL NFR BLD AUTO: 3.6 % (ref 0.3–6.2)
ERYTHROCYTE [DISTWIDTH] IN BLOOD BY AUTOMATED COUNT: 12.3 % (ref 12.3–15.4)
GLOBULIN UR ELPH-MCNC: 2.1 GM/DL
GLUCOSE SERPL-MCNC: 135 MG/DL (ref 65–99)
HBA1C MFR BLD: 7.2 % (ref 4.8–5.6)
HCT VFR BLD AUTO: 44.3 % (ref 34–46.6)
HDLC SERPL-MCNC: 42 MG/DL (ref 40–60)
HGB BLD-MCNC: 15.4 G/DL (ref 12–15.9)
IMM GRANULOCYTES # BLD AUTO: 0.04 10*3/MM3 (ref 0–0.05)
IMM GRANULOCYTES NFR BLD AUTO: 0.6 % (ref 0–0.5)
LDLC SERPL CALC-MCNC: 126 MG/DL (ref 0–100)
LDLC/HDLC SERPL: 2.91 {RATIO}
LYMPHOCYTES # BLD AUTO: 2.03 10*3/MM3 (ref 0.7–3.1)
LYMPHOCYTES NFR BLD AUTO: 31.9 % (ref 19.6–45.3)
MCH RBC QN AUTO: 32 PG (ref 26.6–33)
MCHC RBC AUTO-ENTMCNC: 34.8 G/DL (ref 31.5–35.7)
MCV RBC AUTO: 91.9 FL (ref 79–97)
MONOCYTES # BLD AUTO: 0.55 10*3/MM3 (ref 0.1–0.9)
MONOCYTES NFR BLD AUTO: 8.6 % (ref 5–12)
NEUTROPHILS NFR BLD AUTO: 3.47 10*3/MM3 (ref 1.7–7)
NEUTROPHILS NFR BLD AUTO: 54.5 % (ref 42.7–76)
NRBC BLD AUTO-RTO: 0 /100 WBC (ref 0–0.2)
PLATELET # BLD AUTO: 226 10*3/MM3 (ref 140–450)
PMV BLD AUTO: 10.4 FL (ref 6–12)
POTASSIUM SERPL-SCNC: 3.7 MMOL/L (ref 3.5–5.2)
PROT SERPL-MCNC: 6.5 G/DL (ref 6–8.5)
RBC # BLD AUTO: 4.82 10*6/MM3 (ref 3.77–5.28)
SODIUM SERPL-SCNC: 141 MMOL/L (ref 136–145)
T4 FREE SERPL-MCNC: 1.22 NG/DL (ref 0.92–1.68)
TRIGL SERPL-MCNC: 163 MG/DL (ref 0–150)
TSH SERPL DL<=0.05 MIU/L-ACNC: 1.21 UIU/ML (ref 0.27–4.2)
VIT B12 BLD-MCNC: 830 PG/ML (ref 211–946)
VLDLC SERPL-MCNC: 29 MG/DL (ref 5–40)
WBC NRBC COR # BLD AUTO: 6.37 10*3/MM3 (ref 3.4–10.8)

## 2024-10-12 PROCEDURE — 80053 COMPREHEN METABOLIC PANEL: CPT | Performed by: NURSE PRACTITIONER

## 2024-10-12 PROCEDURE — 84443 ASSAY THYROID STIM HORMONE: CPT | Performed by: NURSE PRACTITIONER

## 2024-10-12 PROCEDURE — 82607 VITAMIN B-12: CPT | Performed by: NURSE PRACTITIONER

## 2024-10-12 PROCEDURE — 84439 ASSAY OF FREE THYROXINE: CPT | Performed by: NURSE PRACTITIONER

## 2024-10-12 PROCEDURE — 36415 COLL VENOUS BLD VENIPUNCTURE: CPT | Performed by: NURSE PRACTITIONER

## 2024-10-12 PROCEDURE — 85025 COMPLETE CBC W/AUTO DIFF WBC: CPT | Performed by: NURSE PRACTITIONER

## 2024-10-12 PROCEDURE — 82306 VITAMIN D 25 HYDROXY: CPT | Performed by: NURSE PRACTITIONER

## 2024-10-12 PROCEDURE — 80061 LIPID PANEL: CPT | Performed by: NURSE PRACTITIONER

## 2024-10-12 PROCEDURE — 83036 HEMOGLOBIN GLYCOSYLATED A1C: CPT | Performed by: NURSE PRACTITIONER

## 2024-11-24 DIAGNOSIS — E11.65 UNCONTROLLED TYPE 2 DIABETES MELLITUS WITH HYPERGLYCEMIA, WITH LONG-TERM CURRENT USE OF INSULIN: ICD-10-CM

## 2024-11-24 DIAGNOSIS — Z79.4 UNCONTROLLED TYPE 2 DIABETES MELLITUS WITH HYPERGLYCEMIA, WITH LONG-TERM CURRENT USE OF INSULIN: ICD-10-CM

## 2024-11-25 DIAGNOSIS — M54.50 CHRONIC BILATERAL LOW BACK PAIN WITHOUT SCIATICA: ICD-10-CM

## 2024-11-25 DIAGNOSIS — M79.7 FIBROMYALGIA: ICD-10-CM

## 2024-11-25 DIAGNOSIS — I10 BENIGN ESSENTIAL HYPERTENSION: ICD-10-CM

## 2024-11-25 DIAGNOSIS — Z79.4 TYPE 2 DIABETES MELLITUS WITH HYPERGLYCEMIA, WITH LONG-TERM CURRENT USE OF INSULIN: ICD-10-CM

## 2024-11-25 DIAGNOSIS — M81.0 OSTEOPOROSIS WITHOUT CURRENT PATHOLOGICAL FRACTURE, UNSPECIFIED OSTEOPOROSIS TYPE: ICD-10-CM

## 2024-11-25 DIAGNOSIS — G89.29 CHRONIC BILATERAL LOW BACK PAIN WITHOUT SCIATICA: ICD-10-CM

## 2024-11-25 DIAGNOSIS — E11.40 DIABETIC NEUROPATHY, PAINFUL: ICD-10-CM

## 2024-11-25 DIAGNOSIS — E11.65 TYPE 2 DIABETES MELLITUS WITH HYPERGLYCEMIA, WITH LONG-TERM CURRENT USE OF INSULIN: ICD-10-CM

## 2024-11-26 RX ORDER — IBUPROFEN 800 MG/1
800 TABLET, FILM COATED ORAL EVERY 8 HOURS PRN
Qty: 30 TABLET | Refills: 5 | Status: SHIPPED | OUTPATIENT
Start: 2024-11-26

## 2024-11-26 RX ORDER — INSULIN ASPART 100 [IU]/ML
INJECTION, SOLUTION INTRAVENOUS; SUBCUTANEOUS
Qty: 30 ML | Refills: 5 | Status: SHIPPED | OUTPATIENT
Start: 2024-11-26

## 2024-11-26 RX ORDER — METOPROLOL SUCCINATE 25 MG/1
25 TABLET, EXTENDED RELEASE ORAL DAILY
Qty: 90 TABLET | Refills: 3 | Status: SHIPPED | OUTPATIENT
Start: 2024-11-26

## 2024-11-26 RX ORDER — CYCLOBENZAPRINE HCL 10 MG
10 TABLET ORAL 3 TIMES DAILY PRN
Qty: 90 TABLET | Refills: 1 | Status: SHIPPED | OUTPATIENT
Start: 2024-11-26

## 2024-11-26 RX ORDER — PREGABALIN 150 MG/1
150 CAPSULE ORAL 2 TIMES DAILY
Qty: 180 CAPSULE | Refills: 0 | Status: SHIPPED | OUTPATIENT
Start: 2024-11-26

## 2024-12-10 ENCOUNTER — E-VISIT (OUTPATIENT)
Dept: ADMINISTRATIVE | Facility: OTHER | Age: 69
End: 2024-12-10
Payer: MEDICARE

## 2024-12-10 NOTE — E-VISIT ESCALATED
Status: Referred Out  Date: 12/10/2024 07:28:01  Acuity Level: Within 8 hours  Referral message:  We're sorry you are not feeling well. Your safety is important to us. Although a bladder infection can cause some discomfort, significant pain is not common. For this reason, we would like for you to be seen in person to determine the   cause of your symptoms and most effective treatment for you.  For the most appropriate care, please be seen:   At a clinic or urgent care  Within 8 hours   You won't be charged for this visit. We hope you feel better soon!   Patient: Mercy Clemens  Patient : [id number]  Patient Address: [address] [place] [address]  Patient Phone: [phone]  Clinician Response: Unavailable  Diagnosis: Unavailable  Diagnosis ICD: Unavailable     Patient Interview Questions and Responses:  Clinical Protocol: UTI  Please select the reason for your visit today.: Urinary tract infection (UTI)  When did your symptoms start?: 1-3 days ago  Do you have any of the following symptoms? Pain or burning when urinating: Yes  Do you have any of the following symptoms? Urinating more often than usual: Yes  Do you have any of the following symptoms? A sudden urge to urinate: Yes  Do you have any of the following symptoms? Unable to hold urine: No  Do you have any of the following symptoms? Feeling like the bladder is never empty: Yes  Do you have any of the following symptoms? Foul-smelling urine: Yes  What color is your urine?: Yellow  Do you have any of the following vaginal symptoms? Discharge: Yes  Do you have any of the following vaginal symptoms? Itching: No  How would you describe the vaginal discharge? Select all that apply. Foamy: No  How would you describe the vaginal discharge? Select all that apply. [place]: No  How would you describe the vaginal discharge? Select all that apply. Smooth: Yes  How would you describe the vaginal discharge? Select all that apply. Watery: No  What color is your  discharge?: White  Is the vaginal discharge you are experiencing abnormal (such as discharge with an unusual odor or appearance)?: No  Do you have any of the following symptoms? Nausea: Yes  Do you have any of the following symptoms? Abdominal pain: Yes  Do you have any of the following symptoms? Vomiting: No  Please rate the severity of your abdominal pain on a pain scale, with 0 being no pain and 10 being the worst pain imaginable.: 7-9 = Severe pain (difficult to perform normal daily activities)

## 2024-12-14 DIAGNOSIS — I10 BENIGN ESSENTIAL HYPERTENSION: ICD-10-CM

## 2024-12-14 RX ORDER — LOSARTAN POTASSIUM AND HYDROCHLOROTHIAZIDE 25; 100 MG/1; MG/1
1 TABLET ORAL DAILY
Qty: 90 TABLET | Refills: 3 | Status: SHIPPED | OUTPATIENT
Start: 2024-12-14

## 2025-01-20 DIAGNOSIS — M81.0 OSTEOPOROSIS WITHOUT CURRENT PATHOLOGICAL FRACTURE, UNSPECIFIED OSTEOPOROSIS TYPE: ICD-10-CM

## 2025-01-20 DIAGNOSIS — M54.50 CHRONIC BILATERAL LOW BACK PAIN WITHOUT SCIATICA: ICD-10-CM

## 2025-01-20 DIAGNOSIS — G89.29 CHRONIC BILATERAL LOW BACK PAIN WITHOUT SCIATICA: ICD-10-CM

## 2025-01-20 DIAGNOSIS — E11.40 DIABETIC NEUROPATHY, PAINFUL: ICD-10-CM

## 2025-01-20 DIAGNOSIS — M79.7 FIBROMYALGIA: ICD-10-CM

## 2025-01-20 RX ORDER — PREGABALIN 150 MG/1
150 CAPSULE ORAL 2 TIMES DAILY
Qty: 180 CAPSULE | Refills: 0 | Status: SHIPPED | OUTPATIENT
Start: 2025-01-20

## 2025-01-20 RX ORDER — IBUPROFEN 800 MG/1
800 TABLET, FILM COATED ORAL EVERY 8 HOURS PRN
Qty: 30 TABLET | Refills: 5 | Status: SHIPPED | OUTPATIENT
Start: 2025-01-20

## 2025-01-20 RX ORDER — CYCLOBENZAPRINE HCL 10 MG
10 TABLET ORAL 3 TIMES DAILY PRN
Qty: 90 TABLET | Refills: 1 | Status: SHIPPED | OUTPATIENT
Start: 2025-01-20 | End: 2025-01-27

## 2025-01-22 ENCOUNTER — HOSPITAL ENCOUNTER (OUTPATIENT)
Dept: MAMMOGRAPHY | Facility: HOSPITAL | Age: 70
Discharge: HOME | End: 2025-01-22
Admitting: NURSE PRACTITIONER
Payer: MEDICARE

## 2025-01-22 PROCEDURE — 77067 SCR MAMMO BI INCL CAD: CPT

## 2025-01-22 PROCEDURE — 77063 BREAST TOMOSYNTHESIS BI: CPT

## 2025-01-27 RX ORDER — CYCLOBENZAPRINE HCL 10 MG
10 TABLET ORAL 3 TIMES DAILY PRN
Qty: 90 TABLET | Refills: 1 | Status: SHIPPED | OUTPATIENT
Start: 2025-01-27

## 2025-02-08 DIAGNOSIS — E11.40 DIABETIC NEUROPATHY, PAINFUL: ICD-10-CM

## 2025-02-08 DIAGNOSIS — M79.7 FIBROMYALGIA: ICD-10-CM

## 2025-02-10 RX ORDER — PREGABALIN 150 MG/1
150 CAPSULE ORAL 2 TIMES DAILY
Qty: 180 CAPSULE | Refills: 0 | Status: SHIPPED | OUTPATIENT
Start: 2025-02-10

## 2025-02-10 NOTE — TELEPHONE ENCOUNTER
Rx Refill Note  Requested Prescriptions     Pending Prescriptions Disp Refills    pregabalin (LYRICA) 150 MG capsule [Pharmacy Med Name: Pregabalin Oral Capsule 150 MG] 180 capsule      Sig: TAKE 1 CAPSULE TWICE DAILY      Last office visit with prescribing clinician: 10/2/2024   Last telemedicine visit with prescribing clinician: Visit date not found   Next office visit with prescribing clinician: Visit date not found                         Would you like a call back once the refill request has been completed: [] Yes [] No    If the office needs to give you a call back, can they leave a voicemail: [] Yes [] No    Shannon Horn MA  02/10/25, 09:16 EST

## 2025-02-13 DIAGNOSIS — E11.40 DIABETIC NEUROPATHY, PAINFUL: ICD-10-CM

## 2025-02-13 DIAGNOSIS — M79.7 FIBROMYALGIA: ICD-10-CM

## 2025-02-14 RX ORDER — PREGABALIN 150 MG/1
150 CAPSULE ORAL 2 TIMES DAILY
Qty: 180 CAPSULE | OUTPATIENT
Start: 2025-02-14

## 2025-03-15 RX ORDER — CYCLOBENZAPRINE HCL 10 MG
10 TABLET ORAL 3 TIMES DAILY PRN
Qty: 90 TABLET | Refills: 0 | Status: SHIPPED | OUTPATIENT
Start: 2025-03-15

## 2025-04-07 RX ORDER — CYCLOBENZAPRINE HCL 10 MG
10 TABLET ORAL 3 TIMES DAILY PRN
Qty: 90 TABLET | Refills: 11 | OUTPATIENT
Start: 2025-04-07

## 2025-06-14 RX ORDER — BLOOD SUGAR DIAGNOSTIC
STRIP MISCELLANEOUS SEE ADMIN INSTRUCTIONS
Qty: 100 EACH | Refills: 3 | Status: SHIPPED | OUTPATIENT
Start: 2025-06-14

## 2025-07-20 NOTE — DISCHARGE INSTRUCTIONS
No pushing, pulling, tugging,  heavy lifting, or strenuous activity.  No major decision making, driving, or drinking alcoholic beverages for 24 hours. ( due to the medications you have  received)  Always use good hand hygiene/washing techniques.  NO driving while taking pain medications.    * if you have an incision:  Check your incision area every day for signs of infection.   Check for:  * more redness, swelling, or pain  *more fluid or blood  *warmth  *pus or bad smellTo assist you in voiding:  Drink plenty of fluids  Listen to running water while attempting to void.    If you are unable to urinate and you have an uncomfortable urge to void or it has been   6 hours since you were discharged, return to the Emergency Room    -HOLD HCTZ/Lisinopril for now unless BP > 140.  - Follow with PCP in 3 days.  - If worsening symptoms return to ED.     20-Jul-2025 14:15

## (undated) DEVICE — ST TBG PNEUMOCLEAR EVAC SMOKE HIFLO

## (undated) DEVICE — ANTIBACTERIAL UNDYED BRAIDED (POLYGLACTIN 910), SYNTHETIC ABSORBABLE SUTURE: Brand: COATED VICRYL

## (undated) DEVICE — ANCHOR TISSUE RETRIEVAL SYSTEM, BAG SIZE 125 ML, PORT SIZE 8 MM: Brand: ANCHOR TISSUE RETRIEVAL SYSTEM

## (undated) DEVICE — SYR SLPTP 30CC

## (undated) DEVICE — COLUMN DRAPE

## (undated) DEVICE — SYR LL TP 10ML STRL

## (undated) DEVICE — GLV SURG SENSICARE PI LF PF 6.5

## (undated) DEVICE — ADHS SKIN PREMIERPRO EXOFIN TOPICAL HI/VISC .5ML

## (undated) DEVICE — CLNSR INST PREKLENZ SOAK/SHLD 6ML MD

## (undated) DEVICE — BLADELESS OBTURATOR: Brand: WECK VISTA

## (undated) DEVICE — HDRST POSTN SLOTTED A/

## (undated) DEVICE — PERMANENT CAUTERY HOOK: Brand: ENDOWRIST

## (undated) DEVICE — GLV SURG SENSICARE POLYISPRN W/ALOE PF LF 6 GRN STRL

## (undated) DEVICE — PK LAP GEN 70

## (undated) DEVICE — SUCTION IRRIGATOR: Brand: ENDOWRIST

## (undated) DEVICE — PATIENT RETURN ELECTRODE, SINGLE-USE, CONTACT QUALITY MONITORING, ADULT, WITH 9FT CORD, FOR PATIENTS WEIGING OVER 33LBS. (15KG): Brand: MEGADYNE

## (undated) DEVICE — LARGE HEM-O-LOK CLIP APPLIER: Brand: ENDOWRIST

## (undated) DEVICE — SUT VIC 0/0 UR6 27IN DYED J603H

## (undated) DEVICE — PROGRASP FORCEPS: Brand: ENDOWRIST

## (undated) DEVICE — CANNULA SEAL

## (undated) DEVICE — CADIERE FORCEPS: Brand: ENDOWRIST

## (undated) DEVICE — ARM DRAPE

## (undated) DEVICE — HYPODERMIC SAFETY NEEDLE: Brand: MONOJECT